# Patient Record
Sex: MALE | Race: WHITE | Employment: PART TIME | URBAN - METROPOLITAN AREA
[De-identification: names, ages, dates, MRNs, and addresses within clinical notes are randomized per-mention and may not be internally consistent; named-entity substitution may affect disease eponyms.]

---

## 2019-02-12 ENCOUNTER — HOSPITAL ENCOUNTER (OUTPATIENT)
Age: 44
Setting detail: SPECIMEN
Discharge: HOME | End: 2019-02-12
Payer: COMMERCIAL

## 2019-02-12 LAB
ABSOLUTE EOS #: 0.15 K/UL (ref 0–0.44)
ABSOLUTE IMMATURE GRANULOCYTE: 0.03 K/UL (ref 0–0.3)
ABSOLUTE LYMPH #: 3.63 K/UL (ref 1.1–3.7)
ABSOLUTE MONO #: 0.72 K/UL (ref 0.1–1.2)
ALBUMIN SERPL-MCNC: 4.5 G/DL (ref 3.5–5.2)
ALBUMIN/GLOBULIN RATIO: 1.5 (ref 1–2.5)
ALP BLD-CCNC: 86 U/L (ref 40–129)
ALT SERPL-CCNC: 36 U/L (ref 5–41)
ANION GAP SERPL CALCULATED.3IONS-SCNC: 14 MMOL/L (ref 9–17)
AST SERPL-CCNC: 35 U/L
BASOPHILS # BLD: 1 % (ref 0–2)
BASOPHILS ABSOLUTE: 0.05 K/UL (ref 0–0.2)
BILIRUB SERPL-MCNC: 0.49 MG/DL (ref 0.3–1.2)
BUN BLDV-MCNC: 9 MG/DL (ref 6–20)
BUN/CREAT BLD: ABNORMAL (ref 9–20)
CALCIUM SERPL-MCNC: 9.8 MG/DL (ref 8.6–10.4)
CHLORIDE BLD-SCNC: 99 MMOL/L (ref 98–107)
CHOLESTEROL/HDL RATIO: 4
CHOLESTEROL: 174 MG/DL
CO2: 27 MMOL/L (ref 20–31)
CREAT SERPL-MCNC: 0.94 MG/DL (ref 0.7–1.2)
DIFFERENTIAL TYPE: NORMAL
EOSINOPHILS RELATIVE PERCENT: 2 % (ref 1–4)
GFR AFRICAN AMERICAN: >60 ML/MIN
GFR NON-AFRICAN AMERICAN: >60 ML/MIN
GFR SERPL CREATININE-BSD FRML MDRD: ABNORMAL ML/MIN/{1.73_M2}
GFR SERPL CREATININE-BSD FRML MDRD: ABNORMAL ML/MIN/{1.73_M2}
GLUCOSE BLD-MCNC: 102 MG/DL (ref 70–99)
HCT VFR BLD CALC: 48.8 % (ref 40.7–50.3)
HDLC SERPL-MCNC: 44 MG/DL
HEMOGLOBIN: 15.7 G/DL (ref 13–17)
IMMATURE GRANULOCYTES: 0 %
LDL CHOLESTEROL: 101 MG/DL (ref 0–130)
LYMPHOCYTES # BLD: 36 % (ref 24–43)
MCH RBC QN AUTO: 29.3 PG (ref 25.2–33.5)
MCHC RBC AUTO-ENTMCNC: 32.2 G/DL (ref 28.4–34.8)
MCV RBC AUTO: 91.2 FL (ref 82.6–102.9)
MONOCYTES # BLD: 7 % (ref 3–12)
NRBC AUTOMATED: 0 PER 100 WBC
PDW BLD-RTO: 13.4 % (ref 11.8–14.4)
PLATELET # BLD: 310 K/UL (ref 138–453)
PLATELET ESTIMATE: NORMAL
PMV BLD AUTO: 10.6 FL (ref 8.1–13.5)
POTASSIUM SERPL-SCNC: 4.7 MMOL/L (ref 3.7–5.3)
RBC # BLD: 5.35 M/UL (ref 4.21–5.77)
RBC # BLD: NORMAL 10*6/UL
SEG NEUTROPHILS: 54 % (ref 36–65)
SEGMENTED NEUTROPHILS ABSOLUTE COUNT: 5.63 K/UL (ref 1.5–8.1)
SODIUM BLD-SCNC: 140 MMOL/L (ref 135–144)
TOTAL PROTEIN: 7.6 G/DL (ref 6.4–8.3)
TRIGL SERPL-MCNC: 147 MG/DL
VLDLC SERPL CALC-MCNC: NORMAL MG/DL (ref 1–30)
WBC # BLD: 10.2 K/UL (ref 3.5–11.3)
WBC # BLD: NORMAL 10*3/UL

## 2019-04-15 ENCOUNTER — OFFICE VISIT (OUTPATIENT)
Dept: FAMILY MEDICINE CLINIC | Age: 44
End: 2019-04-15
Payer: COMMERCIAL

## 2019-04-15 VITALS
BODY MASS INDEX: 40.03 KG/M2 | DIASTOLIC BLOOD PRESSURE: 100 MMHG | OXYGEN SATURATION: 98 % | SYSTOLIC BLOOD PRESSURE: 160 MMHG | RESPIRATION RATE: 16 BRPM | TEMPERATURE: 97 F | HEIGHT: 70 IN | HEART RATE: 73 BPM | WEIGHT: 279.6 LBS

## 2019-04-15 DIAGNOSIS — I10 ESSENTIAL HYPERTENSION: Primary | ICD-10-CM

## 2019-04-15 DIAGNOSIS — F17.200 SMOKER: ICD-10-CM

## 2019-04-15 DIAGNOSIS — M54.6 CHRONIC BILATERAL THORACIC BACK PAIN: ICD-10-CM

## 2019-04-15 DIAGNOSIS — M54.50 CHRONIC BILATERAL LOW BACK PAIN WITHOUT SCIATICA: ICD-10-CM

## 2019-04-15 DIAGNOSIS — G89.29 CHRONIC BILATERAL THORACIC BACK PAIN: ICD-10-CM

## 2019-04-15 DIAGNOSIS — G89.29 CHRONIC PAIN OF RIGHT KNEE: ICD-10-CM

## 2019-04-15 DIAGNOSIS — G89.29 CHRONIC BILATERAL LOW BACK PAIN WITHOUT SCIATICA: ICD-10-CM

## 2019-04-15 DIAGNOSIS — M25.521 RIGHT ELBOW PAIN: ICD-10-CM

## 2019-04-15 DIAGNOSIS — M25.561 CHRONIC PAIN OF RIGHT KNEE: ICD-10-CM

## 2019-04-15 PROCEDURE — G8427 DOCREV CUR MEDS BY ELIG CLIN: HCPCS | Performed by: NURSE PRACTITIONER

## 2019-04-15 PROCEDURE — G8417 CALC BMI ABV UP PARAM F/U: HCPCS | Performed by: NURSE PRACTITIONER

## 2019-04-15 PROCEDURE — 99204 OFFICE O/P NEW MOD 45 MIN: CPT | Performed by: NURSE PRACTITIONER

## 2019-04-15 PROCEDURE — 4004F PT TOBACCO SCREEN RCVD TLK: CPT | Performed by: NURSE PRACTITIONER

## 2019-04-15 RX ORDER — NAPROXEN 500 MG/1
500 TABLET ORAL 2 TIMES DAILY
Qty: 60 TABLET | Refills: 5 | Status: SHIPPED | OUTPATIENT
Start: 2019-04-15 | End: 2020-03-18 | Stop reason: SDUPTHER

## 2019-04-15 RX ORDER — HYDROCHLOROTHIAZIDE 25 MG/1
25 TABLET ORAL EVERY MORNING
Qty: 30 TABLET | Refills: 5 | Status: SHIPPED | OUTPATIENT
Start: 2019-04-15 | End: 2020-01-22 | Stop reason: SDUPTHER

## 2019-04-15 RX ORDER — NAPROXEN 500 MG/1
1 TABLET ORAL 2 TIMES DAILY
COMMUNITY
Start: 2019-04-02 | End: 2019-04-15 | Stop reason: SDUPTHER

## 2019-04-15 RX ORDER — PRAZOSIN HYDROCHLORIDE 2 MG/1
2 CAPSULE ORAL DAILY
COMMUNITY
Start: 2019-03-18 | End: 2022-02-15

## 2019-04-15 RX ORDER — LOSARTAN POTASSIUM 100 MG/1
1 TABLET ORAL DAILY
COMMUNITY
Start: 2019-03-26 | End: 2019-04-15 | Stop reason: SDUPTHER

## 2019-04-15 RX ORDER — LOSARTAN POTASSIUM 100 MG/1
100 TABLET ORAL DAILY
Qty: 30 TABLET | Refills: 5 | Status: SHIPPED | OUTPATIENT
Start: 2019-04-15 | End: 2020-01-06

## 2019-04-15 RX ORDER — QUETIAPINE FUMARATE 50 MG/1
1 TABLET, FILM COATED ORAL DAILY
COMMUNITY
Start: 2019-03-18 | End: 2020-10-06 | Stop reason: ALTCHOICE

## 2019-04-15 RX ORDER — OXCARBAZEPINE 300 MG/1
1 TABLET, FILM COATED ORAL DAILY
COMMUNITY
Start: 2019-03-18

## 2019-04-15 SDOH — HEALTH STABILITY: MENTAL HEALTH: HOW OFTEN DO YOU HAVE A DRINK CONTAINING ALCOHOL?: NEVER

## 2019-04-15 ASSESSMENT — ENCOUNTER SYMPTOMS
BACK PAIN: 1
ABDOMINAL PAIN: 0
SHORTNESS OF BREATH: 0
SORE THROAT: 1
COUGH: 0

## 2019-04-15 NOTE — PROGRESS NOTES
4/15/2019    Joshua Camargo (:  1975) is a 44 y.o. male, here for evaluation of the following medical concerns:    HPI   He is here today as a new patient. He was being seen previously by different providers in [facility]. He was incarcerated for a period of time. He most recently was treated inpatient at [facility] for heroin addiction. At that time he was also started on psychiatric medications. He follows up there for medication management and group therapy. He complains of chronic back pain and was told he has arthritis in his back. He is currently seeing a chiropractor. He does not want to do any physical therapy at this time. He is also complaining of right elbow pain. He has been trying to lift more weights and the repetitive motions seem to bother the area. He hears a popping sound with it. He is complaining of right knee pain. He states while he was incarcerated he slipped and fell directly on his right knee. At the time he did not have it evaluated but he feels it is still painful. Hypertension:  Home blood pressure monitoring: No.  He is not adherent to a low sodium diet. Patient denies chest pain, shortness of breath, headache, lightheadedness, blurred vision, peripheral edema, palpitations, dry cough and fatigue. Antihypertensive medication side effects: no medication side effects noted. Use of agents associated with hypertension: none. Sodium (mmol/L)   Date Value   2019 140    BUN (mg/dL)   Date Value   2019 9    Glucose (mg/dL)   Date Value   2019 102 (H)      Potassium (mmol/L)   Date Value   2019 4.7    CREATININE (mg/dL)   Date Value   2019 0.94           Review of Systems   Constitutional: Positive for fatigue. Negative for fever. HENT: Positive for sore throat. Eyes: Negative for visual disturbance. Respiratory: Negative for cough and shortness of breath. Cardiovascular: Negative for chest pain and palpitations. Gastrointestinal: Negative for abdominal pain. Genitourinary: Negative for difficulty urinating. Musculoskeletal: Positive for arthralgias (right knee pain-fell on patella 2 months ago) and back pain. Seeing his chiropractor-states he has arthritis in his spine   Skin: Negative for rash. Neurological: Negative for dizziness, light-headedness and headaches. Psychiatric/Behavioral: Positive for dysphoric mood. Negative for self-injury, sleep disturbance and suicidal ideas. The patient is nervous/anxious. Goes through [facility] for psychiatric care       Prior to Visit Medications    Not on File        Allergies   Allergen Reactions    Pcn [Penicillins]      Thinks pcn        No past medical history on file. No past surgical history on file.     Social History     Socioeconomic History    Marital status: Unknown     Spouse name: Not on file    Number of children: Not on file    Years of education: Not on file    Highest education level: Not on file   Occupational History    Not on file   Social Needs    Financial resource strain: Not on file    Food insecurity:     Worry: Not on file     Inability: Not on file    Transportation needs:     Medical: Not on file     Non-medical: Not on file   Tobacco Use    Smoking status: Current Every Day Smoker    Smokeless tobacco: Current User    Tobacco comment: one pack a day   Substance and Sexual Activity    Alcohol use: Not Currently     Frequency: Never    Drug use: Not on file     Comment: 127 days sober    Sexual activity: Not on file   Lifestyle    Physical activity:     Days per week: Not on file     Minutes per session: Not on file    Stress: Not on file   Relationships    Social connections:     Talks on phone: Not on file     Gets together: Not on file     Attends Faith service: Not on file     Active member of club or organization: Not on file     Attends meetings of clubs or organizations: Not on file     Relationship status: Not on file    Intimate partner violence:     Fear of current or ex partner: Not on file     Emotionally abused: Not on file     Physically abused: Not on file     Forced sexual activity: Not on file   Other Topics Concern    Not on file   Social History Narrative    Not on file        No family history on file. Vitals:    04/15/19 0910 04/15/19 0924   BP: (!) 160/100 (!) 160/100   Site: Right Upper Arm Right Upper Arm   Position: Sitting Sitting   Cuff Size: Large Adult Large Adult   Pulse: 73    Resp: 16    Temp: 97 °F (36.1 °C)    TempSrc: Tympanic    SpO2: 98%    Weight: 279 lb 9.6 oz (126.8 kg)    Height: 5' 10\" (1.778 m)      Estimated body mass index is 40.12 kg/m² as calculated from the following:    Height as of this encounter: 5' 10\" (1.778 m). Weight as of this encounter: 279 lb 9.6 oz (126.8 kg). Physical Exam   Constitutional: He is oriented to person, place, and time. He appears well-developed and well-nourished. Cardiovascular: Normal rate and regular rhythm. Pulmonary/Chest: Effort normal and breath sounds normal.   Abdominal: Normal appearance and bowel sounds are normal.   obese   Musculoskeletal:        Right elbow: He exhibits normal range of motion (popping sound noted with ROM) and no swelling. Tenderness found. Lateral epicondyle tenderness noted. No radial head, no medial epicondyle and no olecranon process tenderness noted. Right knee: He exhibits no swelling, no effusion and no erythema. Tenderness found. Patellar tendon tenderness noted. No medial joint line and no lateral joint line tenderness noted. Neurological: He is alert and oriented to person, place, and time. Skin: Skin is warm and dry. Psychiatric: His speech is normal and behavior is normal. Judgment and thought content normal. His mood appears anxious. His affect is not angry.  Cognition and memory are normal. He does not exhibit

## 2019-04-15 NOTE — PATIENT INSTRUCTIONS

## 2019-05-14 ENCOUNTER — HOSPITAL ENCOUNTER (OUTPATIENT)
Dept: GENERAL RADIOLOGY | Age: 44
Discharge: HOME | End: 2019-05-16
Payer: COMMERCIAL

## 2019-05-14 DIAGNOSIS — G89.29 CHRONIC PAIN OF RIGHT KNEE: ICD-10-CM

## 2019-05-14 DIAGNOSIS — M25.561 CHRONIC PAIN OF RIGHT KNEE: ICD-10-CM

## 2019-05-14 PROCEDURE — 73562 X-RAY EXAM OF KNEE 3: CPT

## 2019-05-22 ENCOUNTER — OFFICE VISIT (OUTPATIENT)
Dept: FAMILY MEDICINE CLINIC | Age: 44
End: 2019-05-22
Payer: COMMERCIAL

## 2019-05-22 VITALS
DIASTOLIC BLOOD PRESSURE: 80 MMHG | RESPIRATION RATE: 16 BRPM | HEART RATE: 81 BPM | SYSTOLIC BLOOD PRESSURE: 130 MMHG | WEIGHT: 285.6 LBS | BODY MASS INDEX: 40.98 KG/M2 | OXYGEN SATURATION: 98 % | TEMPERATURE: 96.5 F

## 2019-05-22 DIAGNOSIS — R06.2 WHEEZING: ICD-10-CM

## 2019-05-22 DIAGNOSIS — R22.32 SKIN LUMP OF ARM, LEFT: ICD-10-CM

## 2019-05-22 DIAGNOSIS — R06.81 WITNESSED EPISODE OF APNEA: ICD-10-CM

## 2019-05-22 DIAGNOSIS — E66.01 CLASS 3 SEVERE OBESITY DUE TO EXCESS CALORIES WITHOUT SERIOUS COMORBIDITY WITH BODY MASS INDEX (BMI) OF 40.0 TO 44.9 IN ADULT (HCC): ICD-10-CM

## 2019-05-22 DIAGNOSIS — R06.83 SNORING: ICD-10-CM

## 2019-05-22 DIAGNOSIS — F17.200 SMOKER: Primary | ICD-10-CM

## 2019-05-22 DIAGNOSIS — R53.83 FATIGUE, UNSPECIFIED TYPE: ICD-10-CM

## 2019-05-22 PROCEDURE — G8417 CALC BMI ABV UP PARAM F/U: HCPCS | Performed by: NURSE PRACTITIONER

## 2019-05-22 PROCEDURE — 99214 OFFICE O/P EST MOD 30 MIN: CPT | Performed by: NURSE PRACTITIONER

## 2019-05-22 PROCEDURE — G8427 DOCREV CUR MEDS BY ELIG CLIN: HCPCS | Performed by: NURSE PRACTITIONER

## 2019-05-22 PROCEDURE — 4004F PT TOBACCO SCREEN RCVD TLK: CPT | Performed by: NURSE PRACTITIONER

## 2019-05-22 RX ORDER — ALBUTEROL SULFATE 90 UG/1
2 AEROSOL, METERED RESPIRATORY (INHALATION) 4 TIMES DAILY PRN
Qty: 3 INHALER | Refills: 1 | Status: SHIPPED | OUTPATIENT
Start: 2019-05-22 | End: 2020-01-22 | Stop reason: SDUPTHER

## 2019-05-22 ASSESSMENT — PATIENT HEALTH QUESTIONNAIRE - PHQ9
1. LITTLE INTEREST OR PLEASURE IN DOING THINGS: 2
SUM OF ALL RESPONSES TO PHQ QUESTIONS 1-9: 2
SUM OF ALL RESPONSES TO PHQ9 QUESTIONS 1 & 2: 2
2. FEELING DOWN, DEPRESSED OR HOPELESS: 0
SUM OF ALL RESPONSES TO PHQ QUESTIONS 1-9: 2

## 2019-05-22 NOTE — PROGRESS NOTES
2019     Joshua Caamrgo (:  1975) is a 44 y.o. male, here for evaluation of the following medical concerns:    HPI  Hypertension:  Home blood pressure monitoring: No.  He is not adherent to a low sodium diet. Patient denies chest pain, shortness of breath, headache, lightheadedness, blurred vision, peripheral edema, palpitations, dry cough and fatigue. Antihypertensive medication side effects: no medication side effects noted. Use of agents associated with hypertension: none                            About 7 months ago he noticed a lump on the inside of his left upper arm and it is getting bigger in size. He denies any pain or known injury. His girlfriend states that he snores and has apneas. He is tired during the day. He also feels more wheezy at night time. He has used an albuterol inhaler in the past and he states it has helped. He is a smoker and would like to try and quit. Sodium (mmol/L)   Date Value   2019 140    BUN (mg/dL)   Date Value   2019 9    Glucose (mg/dL)   Date Value   2019 102 (H)      Potassium (mmol/L)   Date Value   2019 4.7    CREATININE (mg/dL)   Date Value   2019 0.94           Review of Systems   Constitutional: Negative for fatigue and fever. Cardiovascular: Negative for chest pain. Musculoskeletal: Positive for arthralgias. Skin: Negative for rash. Psychiatric/Behavioral: Positive for sleep disturbance. Negative for self-injury and suicidal ideas. The patient is nervous/anxious. Prior to Visit Medications    Medication Sig Taking?  Authorizing Provider   OXcarbazepine (TRILEPTAL) 300 MG tablet Take 1 tablet by mouth daily Yes Historical Provider, MD   prazosin (MINIPRESS) 2 MG capsule Take 2 capsules by mouth daily Yes Historical Provider, MD   QUEtiapine (SEROQUEL) 50 MG tablet Take 1 tablet by mouth daily Yes Historical Provider, MD   hydrochlorothiazide (HYDRODIURIL) 25 MG tablet Take 1 tablet by mouth every morning Yes Simona Freya, APRN - CNP   losartan (COZAAR) 100 MG tablet Take 1 tablet by mouth daily Yes Dorma Ice, APRN - CNP   naproxen (NAPROSYN) 500 MG tablet Take 1 tablet by mouth 2 times daily Yes Simona Ice, APRN - CNP        Social History     Tobacco Use    Smoking status: Current Every Day Smoker    Smokeless tobacco: Current User    Tobacco comment: one pack a day   Substance Use Topics    Alcohol use: Not Currently     Frequency: Never        Vitals:    05/22/19 0907   BP: 130/80   Site: Right Upper Arm   Position: Sitting   Cuff Size: Large Adult   Pulse: 81   Resp: 16   Temp: 96.5 °F (35.8 °C)   TempSrc: Tympanic   SpO2: 98%   Weight: 285 lb 9.6 oz (129.5 kg)     Estimated body mass index is 40.98 kg/m² as calculated from the following:    Height as of 4/15/19: 5' 10\" (1.778 m). Weight as of this encounter: 285 lb 9.6 oz (129.5 kg). Physical Exam   Constitutional: He appears well-developed and well-nourished. HENT:   Head: Normocephalic and atraumatic. Eyes: Conjunctivae are normal.   Cardiovascular: Normal rate and regular rhythm. Pulmonary/Chest: Effort normal and breath sounds normal.   Skin: Skin is warm and dry. Psychiatric: He has a normal mood and affect. His behavior is normal. Judgment and thought content normal.   Nursing note and vitals reviewed. ASSESSMENT/PLAN:  1. Smoker  - nicotine polacrilex (NICORETTE) 4 MG gum; Take 1 each by mouth as needed for Smoking cessation  Dispense: 110 each; Refill: 3    2. Snoring  - Sleep Study with PAP Titration; Future  - [address]    3. Witnessed episode of apnea  - Sleep Study with PAP Titration; Future  - [address]    4. Fatigue, unspecified type  - Sleep Study with PAP Titration; Future  - [address]    5.  Class 3 severe obesity due to excess calories without serious comorbidity with body mass index (BMI) of 40.0 to 44.9 in adult [facility])  - Sleep Study with PAP Titration; Future  - [address]    6. Wheezing  - albuterol sulfate  (90 Base) MCG/ACT inhaler; Inhale 2 puffs into the lungs 4 times daily as needed for Wheezing  Dispense: 3 Inhaler; Refill: 1    7. Skin lump of arm, left  - US EXTREMITY LEFT NON VASC LIMITED; Future    Return in about 3 months (around 8/22/2019), or if symptoms worsen or fail to improve, for hypertension. Orders Placed This Encounter   Medications    nicotine polacrilex (NICORETTE) 4 MG gum     Sig: Take 1 each by mouth as needed for Smoking cessation     Dispense:  110 each     Refill:  3    albuterol sulfate  (90 Base) MCG/ACT inhaler     Sig: Inhale 2 puffs into the lungs 4 times daily as needed for Wheezing     Dispense:  3 Inhaler     Refill:  1     Orders Placed This Encounter   Procedures    US EXTREMITY LEFT NON VASC LIMITED     Standing Status:   Future     Standing Expiration Date:   5/22/2020     Order Specific Question:   Reason for exam:     Answer:   soft palpable lump left upper arm   [address]     Referral Priority:   Routine     Referral Type:   Eval and Treat     Referral Reason:   Specialty Services Required     Requested Specialty:   Sleep Medicine     Number of Visits Requested:   1    Sleep Study with PAP Titration     Standing Status:   Future     Standing Expiration Date:   5/22/2020     Order Specific Question:   Sleep Study Titration Type     Answer:   CPAP     Order Specific Question:   Location For Sleep Study     Answer:   [place]     Order Specific Question:   Select Sleep Lab Location     Answer:   [facility]       Patient given educational materials - see patient instructions. Discussed use, benefit, and side effects of prescribed medications. All patient questions answered. Pt voiced understanding. Reviewed health maintenance. Instructed to continue current medications, diet and exercise.       Electronically signed by DAWIT Griffin - CNP on 5/22/2019 at 12:58 PM

## 2019-05-22 NOTE — PATIENT INSTRUCTIONS
Patient Education        Sleep Apnea: Care Instructions  Your Care Instructions    Sleep apnea means that you frequently stop breathing for 10 seconds or longer during sleep. It can be mild to severe, based on the number of times an hour that you stop breathing or have slowed breathing. Blocked or narrowed airways in your nose, mouth, or throat can cause sleep apnea. Your airway can become blocked when your throat muscles and tongue relax during sleep. You can treat sleep apnea at home by making lifestyle changes. You also can use a CPAP breathing machine that keeps tissues in the throat from blocking your airway. Or your doctor may suggest that you use a breathing device while you sleep. It helps keep your airway open. This could be a device that you put in your mouth. In some cases, surgery may be needed to remove enlarged tissues in the throat. Follow-up care is a key part of your treatment and safety. Be sure to make and go to all appointments, and call your doctor if you are having problems. It's also a good idea to know your test results and keep a list of the medicines you take. How can you care for yourself at home? · Lose weight, if needed. It may reduce the number of times you stop breathing or have slowed breathing. · Sleep on your side. It may stop mild apnea. If you tend to roll onto your back, sew a pocket in the back of your pajama top. Put a tennis ball into the pocket, and stitch the pocket shut. This will help keep you from sleeping on your back. · Avoid alcohol and medicines such as sleeping pills and sedatives before bed. · Do not smoke. Smoking can make sleep apnea worse. If you need help quitting, talk to your doctor about stop-smoking programs and medicines. These can increase your chances of quitting for good. · Prop up the head of your bed 4 to 6 inches by putting bricks under the legs of the bed.   · Treat breathing problems, such as a stuffy nose, caused by a cold or Version: 12.0  © 2139-7719 [employer]. Care instructions adapted under license by [facility] ([facility]). If you have questions about a medical condition or this instruction, always ask your healthcare professional. [address] any warranty or liability for your use of this information.

## 2019-05-29 ENCOUNTER — TELEPHONE (OUTPATIENT)
Dept: FAMILY MEDICINE CLINIC | Age: 44
End: 2019-05-29

## 2019-05-29 DIAGNOSIS — R53.83 FATIGUE, UNSPECIFIED TYPE: ICD-10-CM

## 2019-05-29 DIAGNOSIS — R06.81 WITNESSED EPISODE OF APNEA: ICD-10-CM

## 2019-05-29 DIAGNOSIS — E66.01 CLASS 3 SEVERE OBESITY DUE TO EXCESS CALORIES WITHOUT SERIOUS COMORBIDITY WITH BODY MASS INDEX (BMI) OF 40.0 TO 44.9 IN ADULT (HCC): ICD-10-CM

## 2019-05-29 DIAGNOSIS — R06.83 SNORING: Primary | ICD-10-CM

## 2019-05-29 NOTE — TELEPHONE ENCOUNTER
Needs order for Baseline diagnostic study SLE3 put in. Do not delete the other order as they may still need it.

## 2019-06-27 ENCOUNTER — OFFICE VISIT (OUTPATIENT)
Dept: FAMILY MEDICINE CLINIC | Age: 44
End: 2019-06-27
Payer: COMMERCIAL

## 2019-06-27 VITALS
DIASTOLIC BLOOD PRESSURE: 96 MMHG | SYSTOLIC BLOOD PRESSURE: 156 MMHG | HEART RATE: 88 BPM | OXYGEN SATURATION: 95 % | TEMPERATURE: 97.4 F

## 2019-06-27 DIAGNOSIS — Z91.09 ENVIRONMENTAL ALLERGIES: ICD-10-CM

## 2019-06-27 DIAGNOSIS — J01.00 ACUTE MAXILLARY SINUSITIS, RECURRENCE NOT SPECIFIED: ICD-10-CM

## 2019-06-27 DIAGNOSIS — H10.9 CONJUNCTIVITIS OF BOTH EYES, UNSPECIFIED CONJUNCTIVITIS TYPE: Primary | ICD-10-CM

## 2019-06-27 PROCEDURE — G8417 CALC BMI ABV UP PARAM F/U: HCPCS | Performed by: NURSE PRACTITIONER

## 2019-06-27 PROCEDURE — 99214 OFFICE O/P EST MOD 30 MIN: CPT | Performed by: NURSE PRACTITIONER

## 2019-06-27 PROCEDURE — 4004F PT TOBACCO SCREEN RCVD TLK: CPT | Performed by: NURSE PRACTITIONER

## 2019-06-27 PROCEDURE — G8427 DOCREV CUR MEDS BY ELIG CLIN: HCPCS | Performed by: NURSE PRACTITIONER

## 2019-06-27 RX ORDER — CETIRIZINE HYDROCHLORIDE 10 MG/1
10 TABLET ORAL DAILY
Qty: 30 TABLET | Refills: 0 | Status: SHIPPED | OUTPATIENT
Start: 2019-06-27 | End: 2019-07-27

## 2019-06-27 RX ORDER — POLYMYXIN B SULFATE AND TRIMETHOPRIM 1; 10000 MG/ML; [USP'U]/ML
1 SOLUTION OPHTHALMIC
Qty: 1 BOTTLE | Refills: 0 | Status: SHIPPED | OUTPATIENT
Start: 2019-06-27 | End: 2019-07-07

## 2019-06-27 RX ORDER — PREDNISONE 20 MG/1
20 TABLET ORAL DAILY
Qty: 5 TABLET | Refills: 0 | Status: SHIPPED | OUTPATIENT
Start: 2019-06-27 | End: 2019-07-02

## 2019-06-27 RX ORDER — DOXYCYCLINE HYCLATE 100 MG/1
100 CAPSULE ORAL 2 TIMES DAILY
Qty: 14 CAPSULE | Refills: 0 | Status: SHIPPED | OUTPATIENT
Start: 2019-06-27 | End: 2019-07-04

## 2019-06-27 ASSESSMENT — ENCOUNTER SYMPTOMS
RHINORRHEA: 1
EYE PAIN: 0
EYE ITCHING: 1
SINUS PAIN: 1
SINUS PRESSURE: 1
COUGH: 1
WHEEZING: 1
EYE REDNESS: 1
EYE DISCHARGE: 1

## 2019-06-27 NOTE — PROGRESS NOTES
[address], APRN-[facility]  [address]  Phone:  [phone]  Fax:  [phone]  Nini Lazcano is a 38 y.o. male who presents today for his medical conditions/complaints as noted below. Joshua A McQuillin c/o of Sinusitis (started around 10 days ago. sneezing, runny nose, congestion, productive cough, watery eyes.)      HPI:     Cough   This is a new problem. The current episode started 1 to 4 weeks ago (10 days). The cough is productive of sputum (yellow). Associated symptoms include eye redness, nasal congestion, rhinorrhea and wheezing. Pertinent negatives include no ear congestion, ear pain, fever or headaches. Associated symptoms comments: Eye drainage - yellow  . Exacerbated by: humidity. Risk factors for lung disease include smoking/tobacco exposure and animal exposure. He has tried a beta-agonist inhaler (4 times a day) for the symptoms. The treatment provided mild relief. His past medical history is significant for bronchitis and environmental allergies. There is no history of asthma or pneumonia. Conjunctivitis    The current episode started 2 days ago. The onset was sudden. The problem occurs frequently. The problem has been gradually worsening. The problem is mild. Nothing aggravates the symptoms. Associated symptoms include eye itching, rhinorrhea, cough, wheezing, eye discharge (yellow green) and eye redness. Pertinent negatives include no fever, no ear pain, no headaches and no eye pain. Both eyes are affected. The eyelid exhibits redness.        Wt Readings from Last 3 Encounters:   05/22/19 285 lb 9.6 oz (129.5 kg)   04/15/19 279 lb 9.6 oz (126.8 kg)       Temp Readings from Last 3 Encounters:   06/27/19 97.4 °F (36.3 °C) (Tympanic)   05/22/19 96.5 °F (35.8 °C) (Tympanic)   04/15/19 97 °F (36.1 °C) (Tympanic)       BP Readings from Last 3 Encounters:   06/27/19 (!) 156/96   05/22/19 130/80   04/15/19 (!) 160/100       Pulse Readings from Last 3 Encounters:   06/27/19 88   05/22/19 81   04/15/19 73              Past Medical History:   Diagnosis Date    Bipolar 1 disorder ([facility] [address]     History of heroin abuse     Hypertension     Personality disorder ([facility] [address]       Past Surgical History:   Procedure Laterality Date    FINGER SURGERY Left     index finger    KNEE SURGERY Left     had metal removed     No family history on file. Social History     Tobacco Use    Smoking status: Current Every Day Smoker     Types: Cigarettes    Smokeless tobacco: Current User    Tobacco comment: one pack a day   Substance Use Topics    Alcohol use: Not Currently     Frequency: Never      Current Outpatient Medications   Medication Sig Dispense Refill    trimethoprim-polymyxin b (POLYTRIM) 45843-0.1 UNIT/ML-% ophthalmic solution Place 1 drop into both eyes every 4 hours (while awake) for 10 days 1 Bottle 0    doxycycline hyclate (VIBRAMYCIN) 100 MG capsule Take 1 capsule by mouth 2 times daily for 7 days With food.  14 capsule 0    predniSONE (DELTASONE) 20 MG tablet Take 1 tablet by mouth daily for 5 days 5 tablet 0    cetirizine (ZYRTEC) 10 MG tablet Take 1 tablet by mouth daily 30 tablet 0    nicotine polacrilex (NICORETTE) 4 MG gum Take 1 each by mouth as needed for Smoking cessation 110 each 3    albuterol sulfate  (90 Base) MCG/ACT inhaler Inhale 2 puffs into the lungs 4 times daily as needed for Wheezing 3 Inhaler 1    OXcarbazepine (TRILEPTAL) 300 MG tablet Take 1 tablet by mouth daily      prazosin (MINIPRESS) 2 MG capsule Take 2 capsules by mouth daily      hydrochlorothiazide (HYDRODIURIL) 25 MG tablet Take 1 tablet by mouth every morning 30 tablet 5    losartan (COZAAR) 100 MG tablet Take 1 tablet by mouth daily 30 tablet 5    naproxen (NAPROSYN) 500 MG tablet Take 1 tablet by mouth 2 times daily 60 tablet 5    QUEtiapine (SEROQUEL) 50 MG tablet Take 1 tablet by mouth daily       No current facility-administered medications for this visit. Allergies   Allergen Reactions    Pcn [Penicillins]      Thinks pcn        No exam data present    Subjective:      Review of Systems   Constitutional: Negative for fever. HENT: Positive for rhinorrhea, sinus pressure and sinus pain. Negative for ear pain and nosebleeds. Eyes: Positive for discharge (yellow green), redness and itching. Negative for pain. Respiratory: Positive for cough and wheezing. Allergic/Immunologic: Positive for environmental allergies. Neurological: Negative for headaches. Objective:     BP (!) 156/96 (Site: Right Upper Arm, Position: Sitting, Cuff Size: Large Adult)   Pulse 88   Temp 97.4 °F (36.3 °C) (Tympanic)   SpO2 95%     Physical Exam   Constitutional: He is oriented to person, place, and time. Vital signs are normal. He appears well-developed and well-nourished. Non-toxic appearance. He does not have a sickly appearance. He does not appear ill. No distress. HENT:   Head: Normocephalic. Right Ear: Tympanic membrane and external ear normal.   Left Ear: Tympanic membrane and external ear normal.   Nose: Mucosal edema present. No rhinorrhea. Right sinus exhibits maxillary sinus tenderness. Right sinus exhibits no frontal sinus tenderness. Left sinus exhibits maxillary sinus tenderness. Left sinus exhibits no frontal sinus tenderness. Mouth/Throat: Uvula is midline and mucous membranes are normal. Mucous membranes are not pale and not dry. Eyes: Pupils are equal, round, and reactive to light. EOM and lids are normal. Right eye exhibits discharge. Left eye exhibits discharge. Right conjunctiva is injected. Left conjunctiva is injected. No scleral icterus. Right eye exhibits no nystagmus. Left eye exhibits no nystagmus. Neck: Trachea normal, normal range of motion and full passive range of motion without pain. No thyroid mass present. Cardiovascular: S1 normal and S2 normal.   Pulmonary/Chest: Effort normal. No accessory muscle usage.  No respiratory distress. Musculoskeletal: Normal range of motion. Neurological: He is alert and oriented to person, place, and time. Skin: Skin is warm, dry and intact. He is not diaphoretic. No pallor. Psychiatric: He has a normal mood and affect. His speech is normal and behavior is normal. Judgment and thought content normal. Cognition and memory are normal.       Assessment:      Diagnosis Orders   1. Conjunctivitis of both eyes, unspecified conjunctivitis type  trimethoprim-polymyxin b (POLYTRIM) 77970-4.1 UNIT/ML-% ophthalmic solution   2. Acute maxillary sinusitis, recurrence not specified  doxycycline hyclate (VIBRAMYCIN) 100 MG capsule   3. Environmental allergies  predniSONE (DELTASONE) 20 MG tablet    cetirizine (ZYRTEC) 10 MG tablet     No results found for this visit on 06/27/19. Plan:       Polytrim eye drops. Take the prednisone with food, preferably first thing in the morning. Zyrtec daily. Doxycycline as directed. Note for today and  Tomorrow. Follow up with primary care provider in 1 to 2 days. Patient Instructions     Polytrim eye drops. Take the prednisone with food, preferably first thing in the morning. Zyrtec daily. Doxycycline as directed. Note for today and  Tomorrow. Follow up with primary care provider in 1 to 2 days. Patient Education        Pinkeye: Care Instructions  Your Care Instructions    Pinkeye is redness and swelling of the eye surface and the conjunctiva (the lining of the eyelid and the covering of the white part of the eye). Pinkeye is also called conjunctivitis. Pinkeye is often caused by infection with bacteria or a virus. Dry air, allergies, smoke, and chemicals are other common causes. Pinkeye often clears on its own in 7 to 10 days. Antibiotics only help if the pinkeye is caused by bacteria. Pinkeye caused by infection spreads easily.  If an allergy or chemical is causing pinkeye, it will not go away unless you can avoid whatever is causing it. Follow-up care is a key part of your treatment and safety. Be sure to make and go to all appointments, and call your doctor if you are having problems. It's also a good idea to know your test results and keep a list of the medicines you take. How can you care for yourself at home? · Wash your hands often. Always wash them before and after you treat pinkeye or touch your eyes or face. · Use moist cotton or a clean, wet cloth to remove crust. Wipe from the inside corner of the eye to the outside. Use a clean part of the cloth for each wipe. · Put cold or warm wet cloths on your eye a few times a day if the eye hurts. · Do not wear contact lenses or eye makeup until the pinkeye is gone. Throw away any eye makeup you were using when you got pinkeye. Clean your contacts and storage case. If you wear disposable contacts, use a new pair when your eye has cleared and it is safe to wear contacts again. · If the doctor gave you antibiotic ointment or eyedrops, use them as directed. Use the medicine for as long as instructed, even if your eye starts looking better soon. Keep the bottle tip clean, and do not let it touch the eye area. · To put in eyedrops or ointment:  ? Tilt your head back, and pull your lower eyelid down with one finger. ? Drop or squirt the medicine inside the lower lid. ? Close your eye for 30 to 60 seconds to let the drops or ointment move around. ? Do not touch the ointment or dropper tip to your eyelashes or any other surface. · Do not share towels, pillows, or washcloths while you have pinkeye. When should you call for help? Call your doctor now or seek immediate medical care if:    · You have pain in your eye, not just irritation on the surface.     · You have a change in vision or loss of vision.     · You have an increase in discharge from the eye.     · Your eye has not started to improve or begins to get worse within 48 hours after you start using antibiotics.      · Pinkeye lasts longer than 7 days.    Watch closely for changes in your health, and be sure to contact your doctor if you have any problems. Where can you learn more? Go to https://Sphera CorporationpedandyjeremieFoxGuard Solutions.Product World. org and sign in to your Moz account. Enter Y392 in the [facility] box to learn more about \"Pinkeye: Care Instructions. \"     If you do not have an account, please click on the \"Sign Up Now\" link. Current as of: September 23, 2018  Content Version: 12.0  © 4448-1491 [employer]. Care instructions adapted under license by [facility] ([facility]). If you have questions about a medical condition or this instruction, always ask your healthcare professional. [address] any warranty or liability for your use of this information. Patient Education        Sinusitis: Care Instructions  Your Care Instructions    Sinusitis is an infection of the lining of the sinus cavities in your head. Sinusitis often follows a cold. It causes pain and pressure in your head and face. In most cases, sinusitis gets better on its own in 1 to 2 weeks. But some mild symptoms may last for several weeks. Sometimes antibiotics are needed. Follow-up care is a key part of your treatment and safety. Be sure to make and go to all appointments, and call your doctor if you are having problems. It's also a good idea to know your test results and keep a list of the medicines you take. How can you care for yourself at home? · Take an over-the-counter pain medicine, such as acetaminophen (Tylenol), ibuprofen (Advil, Motrin), or naproxen (Aleve). Read and follow all instructions on the label. · If the doctor prescribed antibiotics, take them as directed. Do not stop taking them just because you feel better. You need to take the full course of antibiotics. · Be careful when taking over-the-counter cold or flu medicines and Tylenol at the same time.  Many of these medicines have acetaminophen, which is Tylenol. Read the labels to make sure that you are not taking more than the recommended dose. Too much acetaminophen (Tylenol) can be harmful. · Breathe warm, moist air from a steamy shower, a hot bath, or a sink filled with hot water. Avoid cold, dry air. Using a humidifier in your home may help. Follow the directions for cleaning the machine. · Use saline (saltwater) nasal washes to help keep your nasal passages open and wash out mucus and bacteria. You can buy saline nose drops at a grocery store or drugstore. Or you can make your own at home by adding 1 teaspoon of salt and 1 teaspoon of baking soda to 2 cups of distilled water. If you make your own, fill a bulb syringe with the solution, insert the tip into your nostril, and squeeze gently. Janine Barnacle your nose. · Put a hot, wet towel or a warm gel pack on your face 3 or 4 times a day for 5 to 10 minutes each time. · Try a decongestant nasal spray like oxymetazoline (Afrin). Do not use it for more than 3 days in a row. Using it for more than 3 days can make your congestion worse. When should you call for help? Call your doctor now or seek immediate medical care if:    · You have new or worse swelling or redness in your face or around your eyes.     · You have a new or higher fever.    Watch closely for changes in your health, and be sure to contact your doctor if:    · You have new or worse facial pain.     · The mucus from your nose becomes thicker (like pus) or has new blood in it.     · You are not getting better as expected. Where can you learn more? Go to https://"Scrypt[employer] Inc".Visibiz. org and sign in to your Telogis account. Enter H771 in the [employer] box to learn more about \"Sinusitis: Care Instructions. \"     If you do not have an account, please click on the \"Sign Up Now\" link. Current as of: October 21, 2018  Content Version: 12.0  © 1276-7506 Healthwise, Incorporated.  Care instructions adapted under license by [facility]. If you have questions about a medical condition or this instruction, always ask your healthcare professional. [place] [address] any warranty or liability for your use of this information. Patient/Caregiver instructed on use, benefit, and side effects of prescribed medications. All patient/parent/caregiver questions answered. Patient/parent/caregiver voiced understanding. Reviewed health maintenance. Instructed to continue current medications, diet and exercise. Patient agreed with treatment plan. Follow up as directed.            Electronically signed by DAWIT Sims CNP on6/27/2019

## 2019-06-27 NOTE — PATIENT INSTRUCTIONS
Polytrim eye drops. Take the prednisone with food, preferably first thing in the morning. Zyrtec daily. Doxycycline as directed. Note for today and  Tomorrow. Follow up with primary care provider in 1 to 2 days. Patient Education        Pinkeye: Care Instructions  Your Care Instructions    Pinkeye is redness and swelling of the eye surface and the conjunctiva (the lining of the eyelid and the covering of the white part of the eye). Pinkeye is also called conjunctivitis. Pinkeye is often caused by infection with bacteria or a virus. Dry air, allergies, smoke, and chemicals are other common causes. Pinkeye often clears on its own in 7 to 10 days. Antibiotics only help if the pinkeye is caused by bacteria. Pinkeye caused by infection spreads easily. If an allergy or chemical is causing pinkeye, it will not go away unless you can avoid whatever is causing it. Follow-up care is a key part of your treatment and safety. Be sure to make and go to all appointments, and call your doctor if you are having problems. It's also a good idea to know your test results and keep a list of the medicines you take. How can you care for yourself at home? · Wash your hands often. Always wash them before and after you treat pinkeye or touch your eyes or face. · Use moist cotton or a clean, wet cloth to remove crust. Wipe from the inside corner of the eye to the outside. Use a clean part of the cloth for each wipe. · Put cold or warm wet cloths on your eye a few times a day if the eye hurts. · Do not wear contact lenses or eye makeup until the pinkeye is gone. Throw away any eye makeup you were using when you got pinkeye. Clean your contacts and storage case. If you wear disposable contacts, use a new pair when your eye has cleared and it is safe to wear contacts again. · If the doctor gave you antibiotic ointment or eyedrops, use them as directed.  Use the medicine for as long as instructed, even if your eye starts looking better soon. Keep the bottle tip clean, and do not let it touch the eye area. · To put in eyedrops or ointment:  ? Tilt your head back, and pull your lower eyelid down with one finger. ? Drop or squirt the medicine inside the lower lid. ? Close your eye for 30 to 60 seconds to let the drops or ointment move around. ? Do not touch the ointment or dropper tip to your eyelashes or any other surface. · Do not share towels, pillows, or washcloths while you have pinkeye. When should you call for help? Call your doctor now or seek immediate medical care if:    · You have pain in your eye, not just irritation on the surface.     · You have a change in vision or loss of vision.     · You have an increase in discharge from the eye.     · Your eye has not started to improve or begins to get worse within 48 hours after you start using antibiotics.     · Pinkeye lasts longer than 7 days.    Watch closely for changes in your health, and be sure to contact your doctor if you have any problems. Where can you learn more? Go to https://Beijing Booksir."Nouvou, Inc.". org and sign in to your Afrifresh Group account. Enter Y392 in the Mac[employer] box to learn more about \"Pinkeye: Care Instructions. \"     If you do not have an account, please click on the \"Sign Up Now\" link. Current as of: September 23, 2018  Content Version: 12.0  © 4329-9269 [employer]. Care instructions adapted under license by [facility] ([facility]). If you have questions about a medical condition or this instruction, always ask your healthcare professional. [place] [address] any warranty or liability for your use of this information. Patient Education        Sinusitis: Care Instructions  Your Care Instructions    Sinusitis is an infection of the lining of the sinus cavities in your head. Sinusitis often follows a cold. It causes pain and pressure in your head and face.   In most cases, sinusitis gets better on its own in 1 to 2 weeks. But some mild symptoms may last for several weeks. Sometimes antibiotics are needed. Follow-up care is a key part of your treatment and safety. Be sure to make and go to all appointments, and call your doctor if you are having problems. It's also a good idea to know your test results and keep a list of the medicines you take. How can you care for yourself at home? · Take an over-the-counter pain medicine, such as acetaminophen (Tylenol), ibuprofen (Advil, Motrin), or naproxen (Aleve). Read and follow all instructions on the label. · If the doctor prescribed antibiotics, take them as directed. Do not stop taking them just because you feel better. You need to take the full course of antibiotics. · Be careful when taking over-the-counter cold or flu medicines and Tylenol at the same time. Many of these medicines have acetaminophen, which is Tylenol. Read the labels to make sure that you are not taking more than the recommended dose. Too much acetaminophen (Tylenol) can be harmful. · Breathe warm, moist air from a steamy shower, a hot bath, or a sink filled with hot water. Avoid cold, dry air. Using a humidifier in your home may help. Follow the directions for cleaning the machine. · Use saline (saltwater) nasal washes to help keep your nasal passages open and wash out mucus and bacteria. You can buy saline nose drops at a grocery store or drugstore. Or you can make your own at home by adding 1 teaspoon of salt and 1 teaspoon of baking soda to 2 cups of distilled water. If you make your own, fill a bulb syringe with the solution, insert the tip into your nostril, and squeeze gently. Betsy Layne Morn your nose. · Put a hot, wet towel or a warm gel pack on your face 3 or 4 times a day for 5 to 10 minutes each time. · Try a decongestant nasal spray like oxymetazoline (Afrin). Do not use it for more than 3 days in a row. Using it for more than 3 days can make your congestion worse.   When should you call for help?  Call your doctor now or seek immediate medical care if:    · You have new or worse swelling or redness in your face or around your eyes.     · You have a new or higher fever.    Watch closely for changes in your health, and be sure to contact your doctor if:    · You have new or worse facial pain.     · The mucus from your nose becomes thicker (like pus) or has new blood in it.     · You are not getting better as expected. Where can you learn more? Go to https://Infracommerce.EquityZen. org and sign in to your [employer] account. Enter N382 in the [employer]t [employer]mes box to learn more about \"Sinusitis: Care Instructions. \"     If you do not have an account, please click on the \"Sign Up Now\" link. Current as of: October 21, 2018  Content Version: 12.0  © 2780-4190 Healthwise, Incorporated. Care instructions adapted under license by [facility] ([facility]). If you have questions about a medical condition or this instruction, always ask your healthcare professional. [place] [address] any warranty or liability for your use of this information.

## 2020-01-22 ENCOUNTER — OFFICE VISIT (OUTPATIENT)
Dept: FAMILY MEDICINE CLINIC | Age: 45
End: 2020-01-22
Payer: COMMERCIAL

## 2020-01-22 VITALS
WEIGHT: 293.6 LBS | BODY MASS INDEX: 41.1 KG/M2 | TEMPERATURE: 97.3 F | DIASTOLIC BLOOD PRESSURE: 94 MMHG | HEART RATE: 84 BPM | HEIGHT: 71 IN | SYSTOLIC BLOOD PRESSURE: 140 MMHG | OXYGEN SATURATION: 96 %

## 2020-01-22 PROCEDURE — 99214 OFFICE O/P EST MOD 30 MIN: CPT | Performed by: NURSE PRACTITIONER

## 2020-01-22 PROCEDURE — 99212 OFFICE O/P EST SF 10 MIN: CPT

## 2020-01-22 PROCEDURE — G8417 CALC BMI ABV UP PARAM F/U: HCPCS | Performed by: NURSE PRACTITIONER

## 2020-01-22 PROCEDURE — G8427 DOCREV CUR MEDS BY ELIG CLIN: HCPCS | Performed by: NURSE PRACTITIONER

## 2020-01-22 PROCEDURE — G8484 FLU IMMUNIZE NO ADMIN: HCPCS | Performed by: NURSE PRACTITIONER

## 2020-01-22 PROCEDURE — 4004F PT TOBACCO SCREEN RCVD TLK: CPT | Performed by: NURSE PRACTITIONER

## 2020-01-22 RX ORDER — HYDROXYZINE PAMOATE 50 MG/1
CAPSULE ORAL
COMMUNITY
Start: 2019-12-31 | End: 2022-02-15

## 2020-01-22 RX ORDER — ALBUTEROL SULFATE 90 UG/1
2 AEROSOL, METERED RESPIRATORY (INHALATION) EVERY 4 HOURS PRN
Qty: 1 INHALER | Refills: 0 | Status: SHIPPED | OUTPATIENT
Start: 2020-01-22 | End: 2020-03-18 | Stop reason: SDUPTHER

## 2020-01-22 RX ORDER — LOSARTAN POTASSIUM 100 MG/1
100 TABLET ORAL DAILY
Qty: 30 TABLET | Refills: 0 | Status: SHIPPED | OUTPATIENT
Start: 2020-01-22 | End: 2020-03-18 | Stop reason: SDUPTHER

## 2020-01-22 RX ORDER — HYDROCHLOROTHIAZIDE 25 MG/1
25 TABLET ORAL EVERY MORNING
Qty: 30 TABLET | Refills: 0 | Status: SHIPPED | OUTPATIENT
Start: 2020-01-22 | End: 2020-03-18 | Stop reason: SDUPTHER

## 2020-01-22 RX ORDER — CLOTRIMAZOLE 1 %
CREAM (GRAM) TOPICAL
Qty: 1 TUBE | Refills: 0 | Status: SHIPPED | OUTPATIENT
Start: 2020-01-22 | End: 2020-10-06

## 2020-01-22 ASSESSMENT — PATIENT HEALTH QUESTIONNAIRE - PHQ9
2. FEELING DOWN, DEPRESSED OR HOPELESS: 0
1. LITTLE INTEREST OR PLEASURE IN DOING THINGS: 0
SUM OF ALL RESPONSES TO PHQ9 QUESTIONS 1 & 2: 0
SUM OF ALL RESPONSES TO PHQ QUESTIONS 1-9: 0
SUM OF ALL RESPONSES TO PHQ QUESTIONS 1-9: 0

## 2020-01-22 ASSESSMENT — ENCOUNTER SYMPTOMS
COUGH: 1
SHORTNESS OF BREATH: 0
SORE THROAT: 1

## 2020-01-22 NOTE — PROGRESS NOTES
[address], APRN-CNP  [address]  Phone:  [phone]  Fax:  [phone]  Neil Mancia is a 38 y.o. male who presents today for his medical conditions/complaints as noted below. Joshua A McQuillin c/o of Congestion (Cough with congestion, runny nose with green drainage x3 days.); Hypertension (Needs meds refilled. Was incarcerated for a couple months and missed appt.); and Rash (Round rash on right upper arm, left foot.)      HPI:     Hypertension   Chronicity: needs refills - Was incarcerated. Unable to  cozaar at the pharmacy in [facility] . Wants sent to [facility] aid. Pertinent negatives include no chest pain or shortness of breath. Compliance problems include psychosocial issues. Rash   This is a new problem. The current episode started more than 1 month ago. The problem is unchanged. The affected locations include the right arm and left foot. The rash is characterized by redness and scaling. Associated symptoms include coughing and a sore throat. Pertinent negatives include no fever or shortness of breath. Past treatments include nothing. His past medical history is significant for asthma. Cough   This is a new (4 days) problem. The current episode started in the past 7 days. The cough is productive of sputum. Associated symptoms include a rash and a sore throat. Pertinent negatives include no chest pain, fever or shortness of breath. Risk factors for lung disease include smoking/tobacco exposure and animal exposure. He has tried nothing for the symptoms. His past medical history is significant for asthma. There is no history of bronchitis or pneumonia.        Wt Readings from Last 3 Encounters:   01/22/20 293 lb 9.6 oz (133.2 kg)   05/22/19 285 lb 9.6 oz (129.5 kg)   04/15/19 279 lb 9.6 oz (126.8 kg)       Temp Readings from Last 3 Encounters:   01/22/20 97.3 °F (36.3 °C) (Tympanic)   06/27/19 97.4 °F (36.3 °C) (Tympanic)   05/22/19 distress. Breath sounds: Rhonchi (right upper only, cleared with cough) present. Musculoskeletal: Normal range of motion. Skin:     General: Skin is warm and dry. Capillary Refill: Capillary refill takes less than 2 seconds. Coloration: Skin is not pale. Findings: Erythema and rash present. Rash is scaling. Neurological:      Mental Status: He is alert and oriented to person, place, and time. Psychiatric:         Mood and Affect: Mood normal.         Speech: Speech normal.         Behavior: Behavior normal.         Thought Content: Thought content normal.         Judgment: Judgment normal.         Assessment:      Diagnosis Orders   1. Essential hypertension  losartan (COZAAR) 100 MG tablet    hydrochlorothiazide (HYDRODIURIL) 25 MG tablet   2. Tinea corporis  clotrimazole (LOTRIMIN) 1 % cream   3. Bronchitis  albuterol sulfate  (90 Base) MCG/ACT inhaler   4. Wheezing  albuterol sulfate  (90 Base) MCG/ACT inhaler     No results found for this visit on 01/22/20. Plan:               Restart cozaar and hydrochlorothiazide. Clotrimazole as directed. Use for 1 weeks after gone. Albuterol 2 puffs every 4 to 6 hours as needed for cough, wheeze or shortness of breath. Note for today. Follow up with primary care provider in 1 to 2 days if needed. Patient Instructions     Restart cozaar and hydrochlorothiazide. Clotrimazole as directed. Use for 1 weeks after gone. Albuterol 2 puffs every 4 to 6 hours as needed for cough, wheeze or shortness of breath. Note for today. Follow up with primary care provider in 1 to 2 days if needed. Patient Education        Bronchitis: Care Instructions  Your Care Instructions    Bronchitis is inflammation of the bronchial tubes, which carry air to the lungs. The tubes swell and produce mucus, or phlegm. The mucus and inflamed bronchial tubes make you cough. You may have trouble breathing.   Most cases of bronchitis are new or higher fever.     · You have a new rash.    Watch closely for changes in your health, and be sure to contact your doctor if:    · You cough more deeply or more often, especially if you notice more mucus or a change in the color of your mucus.     · You are not getting better as expected. Where can you learn more? Go to https://chpedandyewjose.[facility]. org and sign in to your FriendsClear account. Enter H333 in the [employer] box to learn more about \"Bronchitis: Care Instructions. \"     If you do not have an account, please click on the \"Sign Up Now\" link. Current as of: June 9, 2019  Content Version: 12.3  © 2447-9280 [employer]. Care instructions adapted under license by [facility] ([facility]). If you have questions about a medical condition or this instruction, always ask your healthcare professional. [place] [address] any warranty or liability for your use of this information. Patient Education        High Blood Pressure: Care Instructions  Overview    It's normal for blood pressure to go up and down throughout the day. But if it stays up, you have high blood pressure. Another name for high blood pressure is hypertension. Despite what a lot of people think, high blood pressure usually doesn't cause headaches or make you feel dizzy or lightheaded. It usually has no symptoms. But it does increase your risk of stroke, heart attack, and other problems. You and your doctor will talk about your risks of these problems based on your blood pressure. Your doctor will give you a goal for your blood pressure. Your goal will be based on your health and your age. Lifestyle changes, such as eating healthy and being active, are always important to help lower blood pressure. You might also take medicine to reach your blood pressure goal.  Follow-up care is a key part of your treatment and safety.  Be sure to make and go to all appointments, and call your doctor if you are the top number is higher or the bottom number is higher, or both.     · You think you may be having side effects from your blood pressure medicine. Where can you learn more? Go to https://DraftMixpeJambool.Pricebets. org and sign in to your [employer] account. Enter B168 in the [employer] box to learn more about \"High Blood Pressure: Care Instructions. \"     If you do not have an account, please click on the \"Sign Up Now\" link. Current as of: April 9, 2019  Content Version: 12.3  © 0617-4610 [employer]. Care instructions adapted under license by [facility] ([facility]). If you have questions about a medical condition or this instruction, always ask your healthcare professional. [address] any warranty or liability for your use of this information. Patient Education        Ringworm: Care Instructions  Your Care Instructions  Ringworm is a fungus infection of the skin. It is not caused by a worm. Ringworm causes a round, scaly rash that may crack and itch. The rash can spread over a wide area. One type of fungus that causes ringworm is often found in locker rooms and swimming pools. It grows well in warm, moist areas of the skin, such as in skin folds. You can get ringworm by sharing towels, clothing, and sports equipment. You can also get it by touching someone who has ringworm. Ringworm is treated with cream that kills the fungus. If the rash is widespread, you may need pills to get rid of it. Ringworm often comes back after treatment. If the rash becomes infected with bacteria, you may need antibiotics. Follow-up care is a key part of your treatment and safety. Be sure to make and go to all appointments, and call your doctor if you are having problems. It's also a good idea to know your test results and keep a list of the medicines you take. How can you care for yourself at home? · Take your medicines exactly as prescribed.  Call your doctor if you have any problems with your medicine. · Wash the rash with soap and water, remove flaky skin, and dry thoroughly. · Try an over-the-counter cream with clotrimazole or miconazole in it. Brand names include Lotrimin, Micatin, and Tinactin. Terbinafine cream (Lamisil) is also available without a prescription. Spread the cream beyond the edge or border of the rash. Follow the directions on the package. Do not stop using the medicine just because your skin clears up. You will probably need to continue treatment for 2 to 4 weeks. · To keep from getting another infection:  ? Do not go barefoot in public places such as gyms or locker rooms. Avoid sharing towels and clothes. Use flip-flops or some other type of shoe in the shower. ? Do not wear tight clothes or let your skin stay damp for long periods, such as by staying in a wet bathing suit or sweaty clothes. When should you call for help? Call your doctor now or seek immediate medical care if:    · You have signs of infection such as:  ? Pain, warmth, or swelling in your skin. ? Red streaks near a wound in the skin. ? Pus coming from the rash on your skin. ? A fever.    Watch closely for changes in your health, and be sure to contact your doctor if:    · Your ringworm does not improve after 2 weeks of treatment.     · You do not get better as expected. Where can you learn more? Go to https://wywypeBoomerang.[employer] org and sign in to your [employer] account. Enter O078 in the [facility] box to learn more about \"Ringworm: Care Instructions. \"     If you do not have an account, please click on the \"Sign Up Now\" link. Current as of: April 1, 2019  Content Version: 12.3  © 7087-6877 Health[employer], [employer]. Care instructions adapted under license by [facility] ([facility]).  If you have questions about a medical condition or this instruction, always ask your healthcare professional. [address]  any warranty or liability for your use of this

## 2020-03-18 ENCOUNTER — OFFICE VISIT (OUTPATIENT)
Dept: FAMILY MEDICINE CLINIC | Age: 45
End: 2020-03-18
Payer: COMMERCIAL

## 2020-03-18 VITALS
TEMPERATURE: 96.4 F | BODY MASS INDEX: 42.11 KG/M2 | WEIGHT: 299.8 LBS | SYSTOLIC BLOOD PRESSURE: 138 MMHG | DIASTOLIC BLOOD PRESSURE: 78 MMHG | RESPIRATION RATE: 16 BRPM | OXYGEN SATURATION: 98 % | HEART RATE: 99 BPM

## 2020-03-18 PROCEDURE — 4004F PT TOBACCO SCREEN RCVD TLK: CPT | Performed by: NURSE PRACTITIONER

## 2020-03-18 PROCEDURE — G8427 DOCREV CUR MEDS BY ELIG CLIN: HCPCS | Performed by: NURSE PRACTITIONER

## 2020-03-18 PROCEDURE — 99213 OFFICE O/P EST LOW 20 MIN: CPT | Performed by: NURSE PRACTITIONER

## 2020-03-18 PROCEDURE — 99213 OFFICE O/P EST LOW 20 MIN: CPT

## 2020-03-18 PROCEDURE — G8484 FLU IMMUNIZE NO ADMIN: HCPCS | Performed by: NURSE PRACTITIONER

## 2020-03-18 PROCEDURE — G8417 CALC BMI ABV UP PARAM F/U: HCPCS | Performed by: NURSE PRACTITIONER

## 2020-03-18 RX ORDER — ALBUTEROL SULFATE 90 UG/1
2 AEROSOL, METERED RESPIRATORY (INHALATION) EVERY 4 HOURS PRN
Qty: 1 INHALER | Refills: 3 | Status: SHIPPED | OUTPATIENT
Start: 2020-03-18 | End: 2021-02-25 | Stop reason: SDUPTHER

## 2020-03-18 RX ORDER — HYDROCHLOROTHIAZIDE 25 MG/1
25 TABLET ORAL EVERY MORNING
Qty: 30 TABLET | Refills: 3 | Status: SHIPPED | OUTPATIENT
Start: 2020-03-18 | End: 2020-11-17 | Stop reason: SDUPTHER

## 2020-03-18 RX ORDER — NAPROXEN 500 MG/1
500 TABLET ORAL 2 TIMES DAILY
Qty: 60 TABLET | Refills: 5 | Status: SHIPPED | OUTPATIENT
Start: 2020-03-18 | End: 2020-11-17

## 2020-03-18 RX ORDER — LOSARTAN POTASSIUM 100 MG/1
100 TABLET ORAL DAILY
Qty: 30 TABLET | Refills: 3 | Status: SHIPPED | OUTPATIENT
Start: 2020-03-18 | End: 2020-11-17 | Stop reason: SDUPTHER

## 2020-03-18 ASSESSMENT — ENCOUNTER SYMPTOMS
COUGH: 0
CHOKING: 0

## 2020-03-18 NOTE — PROGRESS NOTES
capsule Take 2 capsules by mouth daily Yes Historical Provider, MD   clotrimazole (LOTRIMIN) 1 % cream Apply topically 2 times daily. Patient not taking: Reported on 3/18/2020  DAWIT Lopez CNP   nicotine polacrilex (NICORETTE) 4 MG gum Take 1 each by mouth as needed for Smoking cessation  Patient not taking: Reported on 1/22/2020  DAWIT Ng CNP   QUEtiapine (SEROQUEL) 50 MG tablet Take 1 tablet by mouth daily  Historical Provider, MD   naproxen (NAPROSYN) 500 MG tablet Take 1 tablet by mouth 2 times daily  Patient not taking: Reported on 1/22/2020  DAWIT Ng CNP        Social History     Tobacco Use    Smoking status: Current Every Day Smoker     Types: Cigarettes    Smokeless tobacco: Current User    Tobacco comment: one pack a day   Substance Use Topics    Alcohol use: Not Currently     Frequency: Never        Vitals:    03/18/20 1126 03/18/20 1127   BP: (!) 150/90 138/78   Site: Right Upper Arm Right Upper Arm   Position: Sitting Sitting   Cuff Size: Large Adult Large Adult   Pulse: 99    Resp: 16    Temp: 96.4 °F (35.8 °C)    TempSrc: Tympanic    SpO2: 98%    Weight: 299 lb 12.8 oz (136 kg)      Estimated body mass index is 42.11 kg/m² as calculated from the following:    Height as of 1/22/20: 5' 10.75\" (1.797 m). Weight as of this encounter: 299 lb 12.8 oz (136 kg). Physical Exam  Vitals signs and nursing note reviewed. Constitutional:       Appearance: Normal appearance. He is normal weight. HENT:      Head: Normocephalic and atraumatic. Eyes:      Conjunctiva/sclera: Conjunctivae normal.   Cardiovascular:      Rate and Rhythm: Normal rate and regular rhythm. Pulmonary:      Effort: Pulmonary effort is normal.      Breath sounds: Normal breath sounds. Skin:     General: Skin is warm and dry. Neurological:      General: No focal deficit present. Mental Status: He is alert and oriented to person, place, and time. Mental status is at baseline. 1 Inhaler; Refill: 3    Return in about 1 month (around 4/18/2020), or if symptoms worsen or fail to improve, for recheck and fasting labs.     Orders Placed This Encounter   Medications    metoprolol tartrate (LOPRESSOR) 25 MG tablet     Sig: Take 1 tablet by mouth 2 times daily     Dispense:  60 tablet     Refill:  3    losartan (COZAAR) 100 MG tablet     Sig: Take 1 tablet by mouth daily     Dispense:  30 tablet     Refill:  3     Maximum Refills Reached    hydroCHLOROthiazide (HYDRODIURIL) 25 MG tablet     Sig: Take 1 tablet by mouth every morning     Dispense:  30 tablet     Refill:  3    naproxen (NAPROSYN) 500 MG tablet     Sig: Take 1 tablet by mouth 2 times daily     Dispense:  60 tablet     Refill:  5    albuterol sulfate  (90 Base) MCG/ACT inhaler     Sig: Inhale 2 puffs into the lungs every 4 hours as needed for Wheezing or Shortness of Breath (cough)     Dispense:  1 Inhaler     Refill:  3     Orders Placed This Encounter   Procedures    Lipid Panel     Standing Status:   Future     Standing Expiration Date:   3/18/2021     Order Specific Question:   Is Patient Fasting?/# of Hours     Answer:   12 hours    CBC Auto Differential     Standing Status:   Future     Standing Expiration Date:   3/18/2021    Comprehensive Metabolic Panel     Standing Status:   Future     Standing Expiration Date:   3/18/2021    TSH without Reflex     Standing Status:   Future     Standing Expiration Date:   3/18/2021    T4, Free     Standing Status:   Future     Standing Expiration Date:   3/18/2021   [address]     Referral Priority:   Routine     Referral Type:   Eval and Treat     Referral Reason:   Specialty Services Required     Requested Specialty:   Sleep Medicine     Number of Visits Requested:   1    Baseline Diagnostic Sleep Study     Standing Status:   Future     Standing Expiration Date:   3/18/2021     Order Specific Question:   Adult or Pediatric     Answer:   Adult

## 2020-03-23 ENCOUNTER — TELEPHONE (OUTPATIENT)
Dept: SLEEP CENTER | Age: 45
End: 2020-03-23

## 2020-09-11 ENCOUNTER — TELEPHONE (OUTPATIENT)
Dept: FAMILY MEDICINE CLINIC | Age: 45
End: 2020-09-11

## 2020-09-11 ENCOUNTER — TELEPHONE (OUTPATIENT)
Dept: SLEEP CENTER | Age: 45
End: 2020-09-11

## 2020-09-11 NOTE — TELEPHONE ENCOUNTER
[facility] contacted the office stating that they have tried to contact the patient several times and they have not been able to be reached.  Wanted you to know that they are going to close the referral.

## 2020-10-06 ENCOUNTER — OFFICE VISIT (OUTPATIENT)
Dept: FAMILY MEDICINE CLINIC | Age: 45
End: 2020-10-06
Payer: COMMERCIAL

## 2020-10-06 VITALS — BODY MASS INDEX: 40.73 KG/M2 | HEART RATE: 97 BPM | WEIGHT: 290 LBS | OXYGEN SATURATION: 99 %

## 2020-10-06 PROCEDURE — 4004F PT TOBACCO SCREEN RCVD TLK: CPT | Performed by: INTERNAL MEDICINE

## 2020-10-06 PROCEDURE — G8427 DOCREV CUR MEDS BY ELIG CLIN: HCPCS | Performed by: INTERNAL MEDICINE

## 2020-10-06 PROCEDURE — G8484 FLU IMMUNIZE NO ADMIN: HCPCS | Performed by: INTERNAL MEDICINE

## 2020-10-06 PROCEDURE — G8417 CALC BMI ABV UP PARAM F/U: HCPCS | Performed by: INTERNAL MEDICINE

## 2020-10-06 PROCEDURE — 99214 OFFICE O/P EST MOD 30 MIN: CPT

## 2020-10-06 PROCEDURE — 99214 OFFICE O/P EST MOD 30 MIN: CPT | Performed by: INTERNAL MEDICINE

## 2020-10-06 RX ORDER — HYDROCODONE BITARTRATE AND ACETAMINOPHEN 10; 325 MG/1; MG/1
1 TABLET ORAL EVERY 8 HOURS PRN
Qty: 60 TABLET | Refills: 0 | Status: SHIPPED | OUTPATIENT
Start: 2020-10-06 | End: 2020-11-05

## 2020-10-06 RX ORDER — CLOTRIMAZOLE 1 %
CREAM (GRAM) TOPICAL 2 TIMES DAILY
Qty: 1 TUBE | Refills: 0 | Status: SHIPPED | OUTPATIENT
Start: 2020-10-06 | End: 2020-11-17 | Stop reason: SDUPTHER

## 2020-10-06 NOTE — PROGRESS NOTES
[address]  [address]  Dept: [phone]  Dept Fax: [phone]  Loc: [phone]     Visit Date:  10/6/2020    Patient:  Gerhardt Sandifer  YOB: 1975    HPI:   Gerhardt Sandifer presents today for   Chief Complaint   Patient presents with   Mio Vance    Shoulder Pain     patient is here today to discuss his left shoulder pain. he did fall over a month ago but still has a lot of pain.  Knee Pain     patient also hurt his knee when he fell. Jam Haines HPI 67-year-old male with a history of obesity BMI 40, hypertension, smoking abuse, bipolar disorder is coming in with with a one-month history of worsening left shoulder pain as well as right knee pain. Left shoulder Pain-Patient reports almost a month ago he fell on his left shoulder and right knee and since then he has been having excruciating pain with no relief of his symptoms. He feels like the left shoulder is the worst it feels more stiff -he has restricted range of motion in all directions even doing simple ADLs like putting his blanket on getting his clothes on has been extremely hard. He tried taking lots of ibuprofen over-the-counter 800 mg/ lidocaine and other over-the-counter creams and treatments with no relief of his symptoms. Taking lidocaine that would last for an hour or 2 and the pain would come back on again. He feels like he might have torn his ligaments or had some form of rotator cuff injury or pathology going on his shoulders. He does report sometime he hears popping sounds when he moves his shoulder and on the day of the injury he did hear snapping sound. He was seen in the ED and had x-rays done of his shoulder that was negative for any acute pathology. No numbness/tingling but sometimes does feel weak.  He has really hard time sleeping on the shoulder and is tossing and turning all the time. No referred pain coming from the neck. The pain is localized along the deltoid lateral shoulder area, sometimes pain is in anterior shoulder and around the acromioclavicular as well as glenohumeral joint. There was no prodromal symptoms like more like mechanical fall where he said that he was running after a bat that he noticed flying in his room and he tripped and fell while holding a tennis racquet on his left shoulder and right knee. In Terms of his right knee he says he has noticed more swollen but he does not have any restriction of motion he has full mobility. He does reports having history of arthritis in his knees and spine. Medications  Prior to Visit Medications    Medication Sig Taking? Authorizing Provider   losartan (COZAAR) 100 MG tablet Take 1 tablet by mouth daily Yes DAWIT Leary CNP   hydroCHLOROthiazide (HYDRODIURIL) 25 MG tablet Take 1 tablet by mouth every morning Yes DAWIT Leary CNP   albuterol sulfate  (90 Base) MCG/ACT inhaler Inhale 2 puffs into the lungs every 4 hours as needed for Wheezing or Shortness of Breath (cough) Yes DAWIT Leary CNP   hydrOXYzine (VISTARIL) 50 MG capsule take 1 capsule by mouth at bedtime Yes Historical Provider, MD   OXcarbazepine (TRILEPTAL) 300 MG tablet Take 1 tablet by mouth daily Yes Historical Provider, MD   prazosin (MINIPRESS) 2 MG capsule Take 2 capsules by mouth daily Yes Historical Provider, MD   metoprolol tartrate (LOPRESSOR) 25 MG tablet Take 1 tablet by mouth 2 times daily  DAWIT Leary CNP   naproxen (NAPROSYN) 500 MG tablet Take 1 tablet by mouth 2 times daily  Patient not taking: Reported on 10/6/2020  DAWIT Leary CNP   clotrimazole (LOTRIMIN) 1 % cream Apply topically 2 times daily.   Patient not taking: Reported on 3/18/2020  DAWIT Thomas NP   nicotine polacrilex (NICORETTE) 4 MG gum Take 1 each by mouth as needed for Smoking cessation  Patient not taking: Reported on 1/22/2020  Guilherme Centeno, APRN - CNP        Allergies:  is allergic to other and pcn [penicillins]. Past Medical History:   has a past medical history of Bipolar 1 disorder ([facility] [address] History of heroin abuse ([facility] [address] Hypertension, and Personality disorder ([facility] [address] Past Surgical History   has a past surgical history that includes knee surgery (Left) and Finger surgery (Left). Family History  family history includes Cancer in his father; Diabetes in his mother; Heart Disease in his father; High Blood Pressure in his father and mother; Kidney Disease in his mother. Social History   reports that he has been smoking cigarettes. He uses smokeless tobacco. He reports previous alcohol use. Health Maintenance:    Health Maintenance   Topic Date Due    Pneumococcal 0-64 years Vaccine (1 of 1 - PPSV23) 06/22/1982    HIV screen  06/22/1991    DTaP/Tdap/Td vaccine (1 - Tdap) 06/22/1995    Diabetes screen  06/22/2016    Potassium monitoring  02/12/2020    Creatinine monitoring  02/12/2020    Flu vaccine (1) 09/01/2020    Lipid screen  02/12/2024    Hepatitis A vaccine  Aged Out    Hepatitis B vaccine  Aged Out    Hib vaccine  Aged Out    Meningococcal (ACWY) vaccine  Aged Out       Subjective:      Review of Systems   Constitutional: Negative for fatigue, fever and unexpected weight change. HENT: Negative for ear pain, postnasal drip, rhinorrhea, sinus pain, sore throat and trouble swallowing. Eyes: Negative for visual disturbance. Respiratory: Negative for cough, chest tightness and shortness of breath. Cardiovascular: Negative for chest pain and leg swelling. Gastrointestinal: Negative for abdominal pain, blood in stool and diarrhea. Endocrine: Negative for polyuria. Genitourinary: Negative for difficulty urinating and flank pain. Musculoskeletal: Positive for arthralgias, joint swelling and myalgias.         Right knee swelling and pain  And left shoulder pain. Skin: Negative for rash. Allergic/Immunologic: Negative for environmental allergies. Neurological: Negative for weakness, light-headedness, numbness and headaches. Hematological: Negative for adenopathy. Psychiatric/Behavioral: Negative for behavioral problems and suicidal ideas. The patient is not nervous/anxious. Objective:     Pulse 97   Wt 290 lb (131.5 kg)   SpO2 99%   BMI 40.73 kg/m²     Physical Exam  Vitals signs and nursing note reviewed. Constitutional:       Appearance: He is obese. HENT:      Head: Normocephalic and atraumatic. Cardiovascular:      Rate and Rhythm: Normal rate and regular rhythm. Pulses: Normal pulses. Heart sounds: Normal heart sounds. Pulmonary:      Breath sounds: No stridor. Abdominal:      Palpations: Abdomen is soft. Musculoskeletal:      Comments: Right knee- crepitations, slight bogginess along the patellofemoral joint. Left Shoulder- diminish range of active and passive range of motion due to the pain, negative sulcus sign, +tenderness along the [facility] and GC joint. +tenderness along the proximal long head biceps tendon at the bicipital groove area. Pulses/strength preserved. Skin:     General: Skin is warm. Findings: Rash present. Comments: Ringworm looking circular erythematous rash noted around right upper extremity. Neurological:      Mental Status: He is oriented to person, place, and time. Mental status is at baseline. Psychiatric:         Mood and Affect: Mood normal.             Assessment       Diagnosis Orders   1. Fall, subsequent encounter  Gaby Dodd MD, Orthopedic Surgery, [place]    MRI SHOULDER LEFT WO CONTRAST    XR KNEE RIGHT (MIN 4 VIEWS)    HYDROcodone-acetaminophen (LORCET HD)  MG per tablet    clotrimazole (LOTRIMIN) 1 % cream   2.  Acute pain of right knee  Gaby Dodd MD, Orthopedic Surgery, [place]    MRI SHOULDER LEFT WO CONTRAST    XR KNEE RIGHT (MIN 4 VIEWS)    HYDROcodone-acetaminophen (LORCET HD)  MG per tablet    clotrimazole (LOTRIMIN) 1 % cream   3. Knee swelling  Ady Winters MD, Orthopedic Surgery, [place]    MRI SHOULDER LEFT WO CONTRAST    XR KNEE RIGHT (MIN 4 VIEWS)    HYDROcodone-acetaminophen (LORCET HD)  MG per tablet    clotrimazole (LOTRIMIN) 1 % cream   4. Acute pain of left shoulder  Ady Winters MD, Orthopedic Surgery, [place]    MRI SHOULDER LEFT WO CONTRAST    XR KNEE RIGHT (MIN 4 VIEWS)    HYDROcodone-acetaminophen (LORCET HD)  MG per tablet    clotrimazole (LOTRIMIN) 1 % cream   5. Ringworm  HYDROcodone-acetaminophen (LORCET HD)  MG per tablet    clotrimazole (LOTRIMIN) 1 % cream         PLAN   Clotrimazole cream prn for probable ringworm/fungal rash noted around the right upper extremity. MRI of the left shoulder-suspecting possible rotator cuff induced tendinopathy/pathology. Encouraged to do some shoulder ROM stretching exercises NSAIDs as needed. Lorcet/narcotics prescribed to the patient today with an understanding that he needs to limit taking these medications as much as possible. I went over some the side effects of narcotics such as not to be combining that with other sedatives like alcohol, impaired cognition,drowsiness/falls, central respiratory depression, overdose, death, tolerance withdrawal, addiction, nausea vomiting, constipation and many others. Orders Placed This Encounter   Procedures    MRI SHOULDER LEFT WO CONTRAST     Standing Status:   Future     Standing Expiration Date:   10/6/2021     Order Specific Question:   Reason for exam:     Answer:   1 month history of left shoulder pain- suspecting rotator cuff injury.     XR KNEE RIGHT (MIN 4 VIEWS)     Standing Status:   Future     Standing Expiration Date:   10/6/2021   Memo Anne MD, Orthopedic Surgery, [place]     Referral Priority:   Routine     Referral Type:   Eval and Treat     Referral Reason:   Specialty Services Required     Referred to Provider:   Yaritza Corrales MD     Requested Specialty:   Orthopedic Surgery     Number of Visits Requested:   1        No follow-ups on file. Patient given educational materials - see patient instructions. Discussed use, benefit, and side effects of prescribed medications. All patient questions answered. Pt voiced understanding. Reviewed health maintenance.        Electronically signed Miriam Luna MD on 10/6/2020 at 3:20 PM EDT

## 2020-10-08 ENCOUNTER — TELEPHONE (OUTPATIENT)
Dept: FAMILY MEDICINE CLINIC | Age: 45
End: 2020-10-08

## 2020-10-08 ASSESSMENT — ENCOUNTER SYMPTOMS
ABDOMINAL PAIN: 0
COUGH: 0
TROUBLE SWALLOWING: 0
SORE THROAT: 0
RHINORRHEA: 0
SINUS PAIN: 0
BLOOD IN STOOL: 0
DIARRHEA: 0
SHORTNESS OF BREATH: 0
CHEST TIGHTNESS: 0

## 2020-10-20 ENCOUNTER — TELEPHONE (OUTPATIENT)
Dept: FAMILY MEDICINE CLINIC | Age: 45
End: 2020-10-20

## 2020-10-20 NOTE — TELEPHONE ENCOUNTER
Tried calling the number for a peer to peer for his MRI denial. After speaking with them the MRI was denied because the patient has not tried PT in the last six months. Spoke with pt and he is willing to try PT but does not understand why he needs to do that. Pt is also scheduled to see ortho on 10/30.

## 2020-10-30 ENCOUNTER — OFFICE VISIT (OUTPATIENT)
Dept: ORTHOPEDIC SURGERY | Age: 45
End: 2020-10-30
Payer: COMMERCIAL

## 2020-10-30 VITALS
DIASTOLIC BLOOD PRESSURE: 110 MMHG | BODY MASS INDEX: 41.29 KG/M2 | SYSTOLIC BLOOD PRESSURE: 180 MMHG | HEART RATE: 90 BPM | OXYGEN SATURATION: 94 % | WEIGHT: 294 LBS

## 2020-10-30 PROBLEM — S80.01XA CONTUSION OF RIGHT KNEE: Status: ACTIVE | Noted: 2020-10-30

## 2020-10-30 PROCEDURE — 4004F PT TOBACCO SCREEN RCVD TLK: CPT | Performed by: ORTHOPAEDIC SURGERY

## 2020-10-30 PROCEDURE — G8417 CALC BMI ABV UP PARAM F/U: HCPCS | Performed by: ORTHOPAEDIC SURGERY

## 2020-10-30 PROCEDURE — 99203 OFFICE O/P NEW LOW 30 MIN: CPT

## 2020-10-30 PROCEDURE — G8484 FLU IMMUNIZE NO ADMIN: HCPCS | Performed by: ORTHOPAEDIC SURGERY

## 2020-10-30 PROCEDURE — G8427 DOCREV CUR MEDS BY ELIG CLIN: HCPCS | Performed by: ORTHOPAEDIC SURGERY

## 2020-10-30 PROCEDURE — 99203 OFFICE O/P NEW LOW 30 MIN: CPT | Performed by: ORTHOPAEDIC SURGERY

## 2020-10-30 RX ORDER — DICLOFENAC SODIUM 75 MG/1
75 TABLET, DELAYED RELEASE ORAL 2 TIMES DAILY
Qty: 28 TABLET | Refills: 0 | Status: SHIPPED | OUTPATIENT
Start: 2020-10-30 | End: 2021-06-09

## 2020-10-30 NOTE — LETTER
10/30/2020    Darian Olivo MD  [place] [address], [address] [facility]    RE: Selma Rolle    Dear Dr. Chauncey Morrell,    Thank you for allowing me to participate in the care of Mr. Maylin Turner. I had the opportunity to evaluate the patient on 10/30/2020. Attached you will find my evaluation and recommendations. Thanks again for the confidence you have expressed in me by allowing my participation in the care of your patient. I will keep you apprised of further developments in the patients treatment course as it progresses. If I can be of further assistance in any fashion, please feel free to contact me at your convenience.     Sincerely,        Dolly Holm  Shoulder and Elbow Surgery
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
## 2020-11-17 ENCOUNTER — VIRTUAL VISIT (OUTPATIENT)
Dept: FAMILY MEDICINE CLINIC | Age: 45
End: 2020-11-17
Payer: COMMERCIAL

## 2020-11-17 PROCEDURE — 99241 PR OFFICE CONSULTATION NEW/ESTAB PATIENT 15 MIN: CPT | Performed by: INTERNAL MEDICINE

## 2020-11-17 RX ORDER — OXYCODONE AND ACETAMINOPHEN 10; 325 MG/1; MG/1
1 TABLET ORAL NIGHTLY PRN
Qty: 60 TABLET | Refills: 0 | Status: SHIPPED | OUTPATIENT
Start: 2020-11-17 | End: 2021-01-16

## 2020-11-17 RX ORDER — HYDROCHLOROTHIAZIDE 25 MG/1
25 TABLET ORAL EVERY MORNING
Qty: 30 TABLET | Refills: 3 | Status: SHIPPED | OUTPATIENT
Start: 2020-11-17 | End: 2021-03-24 | Stop reason: SDUPTHER

## 2020-11-17 RX ORDER — CLOTRIMAZOLE 1 %
CREAM (GRAM) TOPICAL 2 TIMES DAILY
Qty: 1 TUBE | Refills: 0 | Status: SHIPPED | OUTPATIENT
Start: 2020-11-17 | End: 2021-04-29

## 2020-11-17 RX ORDER — LOSARTAN POTASSIUM 100 MG/1
100 TABLET ORAL DAILY
Qty: 30 TABLET | Refills: 3 | Status: SHIPPED | OUTPATIENT
Start: 2020-11-17 | End: 2021-03-24 | Stop reason: SDUPTHER

## 2020-11-17 ASSESSMENT — PATIENT HEALTH QUESTIONNAIRE - PHQ9
SUM OF ALL RESPONSES TO PHQ QUESTIONS 1-9: 0
1. LITTLE INTEREST OR PLEASURE IN DOING THINGS: 0
SUM OF ALL RESPONSES TO PHQ QUESTIONS 1-9: 0
2. FEELING DOWN, DEPRESSED OR HOPELESS: 0
SUM OF ALL RESPONSES TO PHQ9 QUESTIONS 1 & 2: 0
SUM OF ALL RESPONSES TO PHQ QUESTIONS 1-9: 0
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
## 2020-11-19 ASSESSMENT — ENCOUNTER SYMPTOMS
RHINORRHEA: 0
BLOOD IN STOOL: 0
SINUS PAIN: 0
DIARRHEA: 0
SHORTNESS OF BREATH: 0
CHEST TIGHTNESS: 0
ABDOMINAL PAIN: 0
TROUBLE SWALLOWING: 0
SORE THROAT: 0
COUGH: 0

## 2020-12-04 NOTE — PATIENT INSTRUCTIONS
RN NOTES: ANXIETY 

PT.C/O FEELING ANXIOUS ,ATIVAN 1 MG PO PRN GIVEN PER PT. REQUEST,WILL CONTINUE TO MONITOR. Restart cozaar and hydrochlorothiazide. Clotrimazole as directed. Use for 1 weeks after gone. Albuterol 2 puffs every 4 to 6 hours as needed for cough, wheeze or shortness of breath. Note for today. Follow up with primary care provider in 1 to 2 days if needed. Patient Education        Bronchitis: Care Instructions  Your Care Instructions    Bronchitis is inflammation of the bronchial tubes, which carry air to the lungs. The tubes swell and produce mucus, or phlegm. The mucus and inflamed bronchial tubes make you cough. You may have trouble breathing. Most cases of bronchitis are caused by viruses like those that cause colds. Antibiotics usually do not help and they may be harmful. Bronchitis usually develops rapidly and lasts about 2 to 3 weeks in otherwise healthy people. Follow-up care is a key part of your treatment and safety. Be sure to make and go to all appointments, and call your doctor if you are having problems. It's also a good idea to know your test results and keep a list of the medicines you take. How can you care for yourself at home? · Take all medicines exactly as prescribed. Call your doctor if you think you are having a problem with your medicine. · Get some extra rest.  · Take an over-the-counter pain medicine, such as acetaminophen (Tylenol), ibuprofen (Advil, Motrin), or naproxen (Aleve) to reduce fever and relieve body aches. Read and follow all instructions on the label. · Do not take two or more pain medicines at the same time unless the doctor told you to. Many pain medicines have acetaminophen, which is Tylenol. Too much acetaminophen (Tylenol) can be harmful. · Take an over-the-counter cough medicine that contains dextromethorphan to help quiet a dry, hacking cough so that you can sleep. Avoid cough medicines that have more than one active ingredient. Read and follow all instructions on the label.   · Breathe moist air from a humidifier, hot shower, or sink filled with hot it does increase your risk of stroke, heart attack, and other problems. You and your doctor will talk about your risks of these problems based on your blood pressure. Your doctor will give you a goal for your blood pressure. Your goal will be based on your health and your age. Lifestyle changes, such as eating healthy and being active, are always important to help lower blood pressure. You might also take medicine to reach your blood pressure goal.  Follow-up care is a key part of your treatment and safety. Be sure to make and go to all appointments, and call your doctor if you are having problems. It's also a good idea to know your test results and keep a list of the medicines you take. How can you care for yourself at home? Medical treatment  · If you stop taking your medicine, your blood pressure will go back up. You may take one or more types of medicine to lower your blood pressure. Be safe with medicines. Take your medicine exactly as prescribed. Call your doctor if you think you are having a problem with your medicine. · Talk to your doctor before you start taking aspirin every day. Aspirin can help certain people lower their risk of a heart attack or stroke. But taking aspirin isn't right for everyone, because it can cause serious bleeding. · See your doctor regularly. You may need to see the doctor more often at first or until your blood pressure comes down. · If you are taking blood pressure medicine, talk to your doctor before you take decongestants or anti-inflammatory medicine, such as ibuprofen. Some of these medicines can raise blood pressure. · Learn how to check your blood pressure at home. Lifestyle changes  · Stay at a healthy weight. This is especially important if you put on weight around the waist. Losing even 10 pounds can help you lower your blood pressure. · If your doctor recommends it, get more exercise. Walking is a good choice.  Bit by bit, increase the amount you walk every day. Try for at least 30 minutes on most days of the week. You also may want to swim, bike, or do other activities. · Avoid or limit alcohol. Talk to your doctor about whether you can drink any alcohol. · Try to limit how much sodium you eat to less than 2,300 milligrams (mg) a day. Your doctor may ask you to try to eat less than 1,500 mg a day. · Eat plenty of fruits (such as bananas and oranges), vegetables, legumes, whole grains, and low-fat dairy products. · Lower the amount of saturated fat in your diet. Saturated fat is found in animal products such as milk, cheese, and meat. Limiting these foods may help you lose weight and also lower your risk for heart disease. · Do not smoke. Smoking increases your risk for heart attack and stroke. If you need help quitting, talk to your doctor about stop-smoking programs and medicines. These can increase your chances of quitting for good. When should you call for help? Call  911 anytime you think you may need emergency care. This may mean having symptoms that suggest that your blood pressure is causing a serious heart or blood vessel problem. Your blood pressure may be over 180/120.   For example, call  911 if:    · You have symptoms of a heart attack. These may include:  ? Chest pain or pressure, or a strange feeling in the chest.  ? Sweating. ? Shortness of breath. ? Nausea or vomiting. ? Pain, pressure, or a strange feeling in the back, neck, jaw, or upper belly or in one or both shoulders or arms. ? Lightheadedness or sudden weakness. ? A fast or irregular heartbeat.     · You have symptoms of a stroke. These may include:  ? Sudden numbness, tingling, weakness, or loss of movement in your face, arm, or leg, especially on only one side of your body. ? Sudden vision changes. ? Sudden trouble speaking. ? Sudden confusion or trouble understanding simple statements. ? Sudden problems with walking or balance.   ? A sudden, severe headache that is different from past headaches.     · You have severe back or belly pain.    Do not wait until your blood pressure comes down on its own. Get help right away.   Call your doctor now or seek immediate care if:    · Your blood pressure is much higher than normal (such as 180/120 or higher), but you don't have symptoms.     · You think high blood pressure is causing symptoms, such as:  ? Severe headache.  ? Blurry vision.    Watch closely for changes in your health, and be sure to contact your doctor if:    · Your blood pressure measures higher than your doctor recommends at least 2 times. That means the top number is higher or the bottom number is higher, or both.     · You think you may be having side effects from your blood pressure medicine. Where can you learn more? Go to https://[email or web address] org and sign in to your [employer] account. Enter J828 in the Twist box to learn more about \"High Blood Pressure: Care Instructions. \"     If you do not have an account, please click on the \"Sign Up Now\" link. Current as of: April 9, 2019  Content Version: 12.3  © 1190-3275 [employer]. Care instructions adapted under license by [facility] ([facility]). If you have questions about a medical condition or this instruction, always ask your healthcare professional. [address] any warranty or liability for your use of this information. Patient Education        Ringworm: Care Instructions  Your Care Instructions  Ringworm is a fungus infection of the skin. It is not caused by a worm. Ringworm causes a round, scaly rash that may crack and itch. The rash can spread over a wide area. One type of fungus that causes ringworm is often found in locker rooms and swimming pools. It grows well in warm, moist areas of the skin, such as in skin folds. You can get ringworm by sharing towels, clothing, and sports equipment.  You can also get it by touching someone who has

## 2021-02-25 ENCOUNTER — OFFICE VISIT (OUTPATIENT)
Dept: FAMILY MEDICINE CLINIC | Age: 46
End: 2021-02-25
Payer: COMMERCIAL

## 2021-02-25 VITALS
WEIGHT: 272 LBS | OXYGEN SATURATION: 96 % | DIASTOLIC BLOOD PRESSURE: 82 MMHG | SYSTOLIC BLOOD PRESSURE: 136 MMHG | BODY MASS INDEX: 38.2 KG/M2 | HEART RATE: 99 BPM

## 2021-02-25 DIAGNOSIS — M25.561 CHRONIC PAIN OF RIGHT KNEE: ICD-10-CM

## 2021-02-25 DIAGNOSIS — G89.29 CHRONIC PAIN OF RIGHT KNEE: ICD-10-CM

## 2021-02-25 DIAGNOSIS — M54.41 CHRONIC BILATERAL LOW BACK PAIN WITH RIGHT-SIDED SCIATICA: ICD-10-CM

## 2021-02-25 DIAGNOSIS — G89.29 OTHER CHRONIC PAIN: ICD-10-CM

## 2021-02-25 DIAGNOSIS — J40 BRONCHITIS: ICD-10-CM

## 2021-02-25 DIAGNOSIS — J45.40 MODERATE PERSISTENT ASTHMA, UNSPECIFIED WHETHER COMPLICATED: Primary | ICD-10-CM

## 2021-02-25 DIAGNOSIS — R21 RASH: ICD-10-CM

## 2021-02-25 DIAGNOSIS — F17.200 SMOKER: ICD-10-CM

## 2021-02-25 DIAGNOSIS — R06.2 WHEEZING: ICD-10-CM

## 2021-02-25 DIAGNOSIS — G89.29 CHRONIC BILATERAL LOW BACK PAIN WITH RIGHT-SIDED SCIATICA: ICD-10-CM

## 2021-02-25 DIAGNOSIS — M54.2 NECK PAIN: ICD-10-CM

## 2021-02-25 DIAGNOSIS — B35.3 TINEA PEDIS OF LEFT FOOT: ICD-10-CM

## 2021-02-25 PROCEDURE — 4004F PT TOBACCO SCREEN RCVD TLK: CPT | Performed by: INTERNAL MEDICINE

## 2021-02-25 PROCEDURE — G8417 CALC BMI ABV UP PARAM F/U: HCPCS | Performed by: INTERNAL MEDICINE

## 2021-02-25 PROCEDURE — 99213 OFFICE O/P EST LOW 20 MIN: CPT

## 2021-02-25 PROCEDURE — 99214 OFFICE O/P EST MOD 30 MIN: CPT | Performed by: INTERNAL MEDICINE

## 2021-02-25 PROCEDURE — G8484 FLU IMMUNIZE NO ADMIN: HCPCS | Performed by: INTERNAL MEDICINE

## 2021-02-25 PROCEDURE — G8427 DOCREV CUR MEDS BY ELIG CLIN: HCPCS | Performed by: INTERNAL MEDICINE

## 2021-02-25 RX ORDER — BUDESONIDE AND FORMOTEROL FUMARATE DIHYDRATE 160; 4.5 UG/1; UG/1
2 AEROSOL RESPIRATORY (INHALATION) 2 TIMES DAILY
Qty: 1 INHALER | Refills: 3 | Status: SHIPPED | OUTPATIENT
Start: 2021-02-25 | End: 2021-06-07

## 2021-02-25 RX ORDER — NAPROXEN 500 MG/1
500 TABLET ORAL 2 TIMES DAILY PRN
Qty: 180 TABLET | Refills: 2 | Status: SHIPPED | OUTPATIENT
Start: 2021-02-25 | End: 2021-03-24 | Stop reason: SDUPTHER

## 2021-02-25 RX ORDER — TERBINAFINE HYDROCHLORIDE 250 MG/1
250 TABLET ORAL DAILY
Qty: 14 TABLET | Refills: 0 | Status: SHIPPED | OUTPATIENT
Start: 2021-02-25 | End: 2021-03-11

## 2021-02-25 RX ORDER — ALBUTEROL SULFATE 90 UG/1
2 AEROSOL, METERED RESPIRATORY (INHALATION) EVERY 4 HOURS PRN
Qty: 1 INHALER | Refills: 3 | Status: SHIPPED | OUTPATIENT
Start: 2021-02-25 | End: 2022-04-12 | Stop reason: SDUPTHER

## 2021-02-25 NOTE — PROGRESS NOTES
[address]  [address]  Dept: [phone]  Dept Fax: [phone]  Loc: [phone]     Visit Date:  2/25/2021    Patient:  Jacinda Lind  YOB: 1975    HPI:   Jacinda Lind presents today for   Chief Complaint   Patient presents with    Rash     pt has what he thinks is ringworm on arm and foot.  Asthma     patient is having asthma flare ups.  Arthritis     patient would like to get a script for naproxyn for arthritis in his spine. Sarah Hamilton HPI 41year old male with a history of morbid obesity BMI 38, arthritis/chronic low back pain, knee pain, asthma is coming in today for multiple medical complaints    Asthma/COPD/Smoking abuse- Requesting inhaler for his possible asthma/COPD. 1 pack smoking history for the past 30 years continues to smoke. He is only on albuterol and reports taking a longer acting bronchodilators in the past but not currently. Used to be on Symbicort and reports periodicCP/tightness/wheezing or worsening shortness of breath related episodes especially at night. Rash- Right upper extremity near bicep area circular rash is less on the left dorsal aspect of his toes top of the foot area looks like ringworm rash. He has been using topical agents with minimal relief of his symptoms is not itchy does not bed. Heroin Abuse/Narcotics Dependence/Opiod addiction-currently in rehab and will be in acute rehab for next couple months. He had a recent ER visit was found on the passenger seat snoring with respiratory rate 8 and was reported to be snorting heroin. Polyartharalgia/arthritis/low back pain neck pain.-He is asking for refills on naproxen today. He has seen chiropractors in the past.    Medications  Prior to Visit Medications    Medication Sig Taking?  Authorizing Provider losartan (COZAAR) 100 MG tablet Take 1 tablet by mouth daily Yes Erin Langley MD   hydroCHLOROthiazide (HYDRODIURIL) 25 MG tablet Take 1 tablet by mouth every morning Yes Erin Langley MD   clotrimazole (LOTRIMIN) 1 % cream Apply topically 2 times daily Apply topically 2 times daily. Yes Erin Langley MD   metoprolol tartrate (LOPRESSOR) 25 MG tablet Take 1 tablet by mouth 2 times daily Yes DAWIT Hayes CNP   hydrOXYzine (VISTARIL) 50 MG capsule take 1 capsule by mouth at bedtime Yes Historical Provider, MD   OXcarbazepine (TRILEPTAL) 300 MG tablet Take 1 tablet by mouth daily Yes Historical Provider, MD   diclofenac (VOLTAREN) 75 MG EC tablet Take 1 tablet by mouth 2 times daily for 14 days  Hali Renee MD   albuterol sulfate  (90 Base) MCG/ACT inhaler Inhale 2 puffs into the lungs every 4 hours as needed for Wheezing or Shortness of Breath (cough)  Patient not taking: Reported on 2/25/2021  DAWIT Hayes CNP   prazosin (MINIPRESS) 2 MG capsule Take 2 capsules by mouth daily  Historical Provider, MD        Allergies:  is allergic to other and pcn [penicillins]. Past Medical History:   has a past medical history of Bipolar 1 disorder ([facility] [address] History of heroin abuse ([facility] [address] Hypertension, and Personality disorder ([facility] [address] Past Surgical History   has a past surgical history that includes knee surgery (Left) and Finger surgery (Left). Family History  family history includes Cancer in his father; Diabetes in his mother; Heart Disease in his father; High Blood Pressure in his father and mother; Kidney Disease in his mother. Social History   reports that he has been smoking cigarettes. He uses smokeless tobacco. He reports previous alcohol use.     Health Maintenance:    Health Maintenance   Topic Date Due    Hepatitis C screen  1975    Pneumococcal 0-64 years Vaccine (1 of 1 - PPSV23) 06/22/1982    HIV screen  06/22/1991  DTaP/Tdap/Td vaccine (1 - Tdap) 06/22/1995    Potassium monitoring  02/12/2020    Creatinine monitoring  02/12/2020    Flu vaccine (1) 09/01/2020    Lipid screen  02/12/2024    Hepatitis A vaccine  Aged Out    Hepatitis B vaccine  Aged Out    Hib vaccine  Aged Out    Meningococcal (ACWY) vaccine  Aged Out       Subjective:      Review of Systems   Constitutional: Negative for fatigue, fever and unexpected weight change. HENT: Negative for ear pain, postnasal drip, rhinorrhea, sinus pain, sore throat and trouble swallowing. Eyes: Negative for visual disturbance. Respiratory: Negative for cough, chest tightness and shortness of breath. Cardiovascular: Negative for chest pain and leg swelling. Gastrointestinal: Negative for abdominal pain, blood in stool and diarrhea. Endocrine: Negative for polyuria. Genitourinary: Negative for difficulty urinating and flank pain. Musculoskeletal: Positive for arthralgias, back pain, neck pain and neck stiffness. Negative for joint swelling and myalgias. Skin: Positive for rash. Allergic/Immunologic: Negative for environmental allergies. Neurological: Negative for weakness, light-headedness, numbness and headaches. Hematological: Negative for adenopathy. Psychiatric/Behavioral: Negative for behavioral problems and suicidal ideas. The patient is not nervous/anxious. Objective:     /82 (Site: Right Upper Arm, Position: Sitting)   Pulse 99   Wt 272 lb (123.4 kg)   SpO2 96%   BMI 38.20 kg/m²     Physical Exam  Vitals signs and nursing note reviewed. Constitutional:       Appearance: He is obese. HENT:      Head: Normocephalic and atraumatic. Cardiovascular:      Rate and Rhythm: Normal rate and regular rhythm. Pulses: Normal pulses. Heart sounds: Normal heart sounds. Pulmonary:      Effort: Pulmonary effort is normal.      Breath sounds: Normal breath sounds. No stridor.    Abdominal: Palpations: Abdomen is soft. Skin:     General: Skin is warm. Findings: Rash present. Comments: Oval shaped macular scaly looking erythematous rash noticed on RUE and left foot area. - classic for central clearing with raised borders. Neurological:      Mental Status: He is oriented to person, place, and time. Mental status is at baseline. Psychiatric:         Mood and Affect: Mood normal.             Assessment       Diagnosis Orders   1. Moderate persistent asthma, unspecified whether complicated  naproxen (NAPROSYN) 500 MG tablet    albuterol sulfate  (90 Base) MCG/ACT inhaler    budesonide-formoterol (SYMBICORT) 160-4.5 MCG/ACT AERO    terbinafine (LAMISIL) 250 MG tablet    CBC Auto Differential    Comprehensive Metabolic Panel    TSH    T4, Free    Vitamin D 25 Hydroxy    Lipid Panel    Hemoglobin A1C    Urinalysis With Microscopic   2. Other chronic pain  naproxen (NAPROSYN) 500 MG tablet    albuterol sulfate  (90 Base) MCG/ACT inhaler    budesonide-formoterol (SYMBICORT) 160-4.5 MCG/ACT AERO    terbinafine (LAMISIL) 250 MG tablet    CBC Auto Differential    Comprehensive Metabolic Panel    TSH    T4, Free    Vitamin D 25 Hydroxy    Lipid Panel    Hemoglobin A1C    Urinalysis With Microscopic   3. Chronic bilateral low back pain with right-sided sciatica  naproxen (NAPROSYN) 500 MG tablet    albuterol sulfate  (90 Base) MCG/ACT inhaler    budesonide-formoterol (SYMBICORT) 160-4.5 MCG/ACT AERO    terbinafine (LAMISIL) 250 MG tablet    CBC Auto Differential    Comprehensive Metabolic Panel    TSH    T4, Free    Vitamin D 25 Hydroxy    Lipid Panel    Hemoglobin A1C    Urinalysis With Microscopic   4.  Rash  naproxen (NAPROSYN) 500 MG tablet    albuterol sulfate  (90 Base) MCG/ACT inhaler    budesonide-formoterol (SYMBICORT) 160-4.5 MCG/ACT AERO    terbinafine (LAMISIL) 250 MG tablet    CBC Auto Differential    Comprehensive Metabolic Panel    TSH    T4, Free Vitamin D 25 Hydroxy    Lipid Panel    Hemoglobin A1C    Urinalysis With Microscopic   5. Wheezing  naproxen (NAPROSYN) 500 MG tablet    albuterol sulfate  (90 Base) MCG/ACT inhaler    budesonide-formoterol (SYMBICORT) 160-4.5 MCG/ACT AERO    terbinafine (LAMISIL) 250 MG tablet    CBC Auto Differential    Comprehensive Metabolic Panel    TSH    T4, Free    Vitamin D 25 Hydroxy    Lipid Panel    Hemoglobin A1C    Urinalysis With Microscopic   6. Bronchitis  naproxen (NAPROSYN) 500 MG tablet    albuterol sulfate  (90 Base) MCG/ACT inhaler    budesonide-formoterol (SYMBICORT) 160-4.5 MCG/ACT AERO    terbinafine (LAMISIL) 250 MG tablet    CBC Auto Differential    Comprehensive Metabolic Panel    TSH    T4, Free    Vitamin D 25 Hydroxy    Lipid Panel    Hemoglobin A1C    Urinalysis With Microscopic   7. Smoker  naproxen (NAPROSYN) 500 MG tablet    albuterol sulfate  (90 Base) MCG/ACT inhaler    budesonide-formoterol (SYMBICORT) 160-4.5 MCG/ACT AERO    terbinafine (LAMISIL) 250 MG tablet    CBC Auto Differential    Comprehensive Metabolic Panel    TSH    T4, Free    Vitamin D 25 Hydroxy    Lipid Panel    Hemoglobin A1C    Urinalysis With Microscopic   8. Neck pain  naproxen (NAPROSYN) 500 MG tablet    albuterol sulfate  (90 Base) MCG/ACT inhaler    budesonide-formoterol (SYMBICORT) 160-4.5 MCG/ACT AERO    terbinafine (LAMISIL) 250 MG tablet    CBC Auto Differential    Comprehensive Metabolic Panel    TSH    T4, Free    Vitamin D 25 Hydroxy    Lipid Panel    Hemoglobin A1C    Urinalysis With Microscopic   9.  Chronic pain of right knee  naproxen (NAPROSYN) 500 MG tablet    albuterol sulfate  (90 Base) MCG/ACT inhaler    budesonide-formoterol (SYMBICORT) 160-4.5 MCG/ACT AERO    terbinafine (LAMISIL) 250 MG tablet    CBC Auto Differential    Comprehensive Metabolic Panel    TSH    T4, Free    Vitamin D 25 Hydroxy    Lipid Panel    Hemoglobin A1C    Urinalysis With Microscopic 10. Tinea pedis of left foot  naproxen (NAPROSYN) 500 MG tablet    albuterol sulfate  (90 Base) MCG/ACT inhaler    budesonide-formoterol (SYMBICORT) 160-4.5 MCG/ACT AERO    terbinafine (LAMISIL) 250 MG tablet    CBC Auto Differential    Comprehensive Metabolic Panel    TSH    T4, Free    Vitamin D 25 Hydroxy    Lipid Panel    Hemoglobin A1C    Urinalysis With Microscopic         PLAN   Albuterol and Symbicort Rx placed,. Counseled on minimizing smoking  Labs/fasting labs ordered  Trial of Terbinafine x 2 weeks for the tinea infection  Naproxen for arthritis pain/chronic low back pain. Follow up in 2 weeks  Need info in regards to suboxone clinic. Orders Placed This Encounter   Procedures    CBC Auto Differential     Standing Status:   Future     Standing Expiration Date:   2/25/2022    Comprehensive Metabolic Panel     Standing Status:   Future     Standing Expiration Date:   2/25/2022    TSH     Standing Status:   Future     Standing Expiration Date:   2/25/2022    T4, Free     Standing Status:   Future     Standing Expiration Date:   2/25/2022    Vitamin D 25 Hydroxy     Standing Status:   Future     Standing Expiration Date:   2/25/2022    Lipid Panel     Standing Status:   Future     Standing Expiration Date:   2/25/2022     Order Specific Question:   Is Patient Fasting?/# of Hours     Answer:   12 hours    Hemoglobin A1C     Standing Status:   Future     Standing Expiration Date:   2/25/2022    Urinalysis With Microscopic     Standing Status:   Future     Standing Expiration Date:   2/25/2022     Order Specific Question:   SPECIFY(EX-CATH,MIDSTREAM,CYSTO,ETC)? Answer:   mid stream        No follow-ups on file. Patient given educational materials - see patient instructions. Discussed use, benefit, and side effects of prescribed medications. All patient questions answered. Pt voiced understanding. Reviewed health maintenance. Electronically signed Caridad Perez MD on 2/25/2021 at 2:12 PM EST

## 2021-03-01 ASSESSMENT — ENCOUNTER SYMPTOMS
SHORTNESS OF BREATH: 0
BACK PAIN: 1
BLOOD IN STOOL: 0
DIARRHEA: 0
RHINORRHEA: 0
ABDOMINAL PAIN: 0
SORE THROAT: 0
CHEST TIGHTNESS: 0
COUGH: 0
TROUBLE SWALLOWING: 0
SINUS PAIN: 0

## 2021-03-23 DIAGNOSIS — M25.512 CHRONIC LEFT SHOULDER PAIN: ICD-10-CM

## 2021-03-23 DIAGNOSIS — G89.29 CHRONIC LEFT SHOULDER PAIN: ICD-10-CM

## 2021-03-23 DIAGNOSIS — G89.29 CHRONIC BILATERAL LOW BACK PAIN WITH RIGHT-SIDED SCIATICA: ICD-10-CM

## 2021-03-23 DIAGNOSIS — G89.29 OTHER CHRONIC PAIN: ICD-10-CM

## 2021-03-23 DIAGNOSIS — M54.2 NECK PAIN: ICD-10-CM

## 2021-03-23 DIAGNOSIS — M12.812 ROTATOR CUFF ARTHROPATHY OF LEFT SHOULDER: ICD-10-CM

## 2021-03-23 DIAGNOSIS — B35.3 TINEA PEDIS OF LEFT FOOT: ICD-10-CM

## 2021-03-23 DIAGNOSIS — F17.200 SMOKER: ICD-10-CM

## 2021-03-23 DIAGNOSIS — R21 RASH: ICD-10-CM

## 2021-03-23 DIAGNOSIS — J40 BRONCHITIS: ICD-10-CM

## 2021-03-23 DIAGNOSIS — M54.41 CHRONIC BILATERAL LOW BACK PAIN WITH RIGHT-SIDED SCIATICA: ICD-10-CM

## 2021-03-23 DIAGNOSIS — B35.9 RINGWORM: ICD-10-CM

## 2021-03-23 DIAGNOSIS — R06.2 WHEEZING: ICD-10-CM

## 2021-03-23 DIAGNOSIS — I10 ESSENTIAL HYPERTENSION: ICD-10-CM

## 2021-03-23 DIAGNOSIS — J45.40 MODERATE PERSISTENT ASTHMA, UNSPECIFIED WHETHER COMPLICATED: ICD-10-CM

## 2021-03-23 DIAGNOSIS — M25.561 CHRONIC PAIN OF RIGHT KNEE: ICD-10-CM

## 2021-03-23 DIAGNOSIS — G89.29 CHRONIC PAIN OF RIGHT KNEE: ICD-10-CM

## 2021-03-24 ENCOUNTER — OFFICE VISIT (OUTPATIENT)
Dept: FAMILY MEDICINE CLINIC | Age: 46
End: 2021-03-24
Payer: COMMERCIAL

## 2021-03-24 VITALS
HEART RATE: 98 BPM | WEIGHT: 274 LBS | BODY MASS INDEX: 38.49 KG/M2 | SYSTOLIC BLOOD PRESSURE: 148 MMHG | OXYGEN SATURATION: 96 % | DIASTOLIC BLOOD PRESSURE: 82 MMHG

## 2021-03-24 DIAGNOSIS — Z79.4 TYPE 2 DIABETES MELLITUS WITH MICROALBUMINURIA, WITH LONG-TERM CURRENT USE OF INSULIN (HCC): ICD-10-CM

## 2021-03-24 DIAGNOSIS — E78.1 HYPERTRIGLYCERIDEMIA: ICD-10-CM

## 2021-03-24 DIAGNOSIS — J40 BRONCHITIS: ICD-10-CM

## 2021-03-24 DIAGNOSIS — B35.9 RINGWORM: ICD-10-CM

## 2021-03-24 DIAGNOSIS — R80.9 TYPE 2 DIABETES MELLITUS WITH MICROALBUMINURIA, WITH LONG-TERM CURRENT USE OF INSULIN (HCC): ICD-10-CM

## 2021-03-24 DIAGNOSIS — M54.41 CHRONIC BILATERAL LOW BACK PAIN WITH RIGHT-SIDED SCIATICA: ICD-10-CM

## 2021-03-24 DIAGNOSIS — M54.2 NECK PAIN: ICD-10-CM

## 2021-03-24 DIAGNOSIS — L60.0 INGROWING NAIL, RIGHT GREAT TOE: Primary | ICD-10-CM

## 2021-03-24 DIAGNOSIS — F17.200 SMOKER: ICD-10-CM

## 2021-03-24 DIAGNOSIS — I10 ESSENTIAL HYPERTENSION: ICD-10-CM

## 2021-03-24 DIAGNOSIS — B35.3 TINEA PEDIS OF LEFT FOOT: ICD-10-CM

## 2021-03-24 DIAGNOSIS — J45.40 MODERATE PERSISTENT ASTHMA, UNSPECIFIED WHETHER COMPLICATED: ICD-10-CM

## 2021-03-24 DIAGNOSIS — F11.21 OPIOID DEPENDENCE IN REMISSION (HCC): ICD-10-CM

## 2021-03-24 DIAGNOSIS — F14.10 COCAINE ABUSE (HCC): ICD-10-CM

## 2021-03-24 DIAGNOSIS — R21 RASH: ICD-10-CM

## 2021-03-24 DIAGNOSIS — G89.29 CHRONIC PAIN OF RIGHT KNEE: ICD-10-CM

## 2021-03-24 DIAGNOSIS — E87.6 HYPOKALEMIA: ICD-10-CM

## 2021-03-24 DIAGNOSIS — R63.1 POLYDIPSIA: ICD-10-CM

## 2021-03-24 DIAGNOSIS — G89.29 CHRONIC BILATERAL LOW BACK PAIN WITH RIGHT-SIDED SCIATICA: ICD-10-CM

## 2021-03-24 DIAGNOSIS — M25.561 CHRONIC PAIN OF RIGHT KNEE: ICD-10-CM

## 2021-03-24 DIAGNOSIS — E55.9 VITAMIN D DEFICIENCY: ICD-10-CM

## 2021-03-24 DIAGNOSIS — E11.29 TYPE 2 DIABETES MELLITUS WITH MICROALBUMINURIA, WITH LONG-TERM CURRENT USE OF INSULIN (HCC): ICD-10-CM

## 2021-03-24 DIAGNOSIS — R35.89 POLYURIA: ICD-10-CM

## 2021-03-24 DIAGNOSIS — R06.2 WHEEZING: ICD-10-CM

## 2021-03-24 DIAGNOSIS — G89.29 OTHER CHRONIC PAIN: ICD-10-CM

## 2021-03-24 PROCEDURE — 99213 OFFICE O/P EST LOW 20 MIN: CPT

## 2021-03-24 PROCEDURE — 3046F HEMOGLOBIN A1C LEVEL >9.0%: CPT | Performed by: INTERNAL MEDICINE

## 2021-03-24 PROCEDURE — 99214 OFFICE O/P EST MOD 30 MIN: CPT | Performed by: INTERNAL MEDICINE

## 2021-03-24 PROCEDURE — G8427 DOCREV CUR MEDS BY ELIG CLIN: HCPCS | Performed by: INTERNAL MEDICINE

## 2021-03-24 PROCEDURE — 2022F DILAT RTA XM EVC RTNOPTHY: CPT | Performed by: INTERNAL MEDICINE

## 2021-03-24 PROCEDURE — G8417 CALC BMI ABV UP PARAM F/U: HCPCS | Performed by: INTERNAL MEDICINE

## 2021-03-24 PROCEDURE — G8484 FLU IMMUNIZE NO ADMIN: HCPCS | Performed by: INTERNAL MEDICINE

## 2021-03-24 PROCEDURE — 4004F PT TOBACCO SCREEN RCVD TLK: CPT | Performed by: INTERNAL MEDICINE

## 2021-03-24 RX ORDER — HYDROCHLOROTHIAZIDE 25 MG/1
25 TABLET ORAL EVERY MORNING
Qty: 30 TABLET | Refills: 3 | Status: SHIPPED | OUTPATIENT
Start: 2021-03-24 | End: 2021-07-12

## 2021-03-24 RX ORDER — BUPRENORPHINE 10 UG/H
1 PATCH TRANSDERMAL WEEKLY
Qty: 4 PATCH | Refills: 0 | Status: SHIPPED | OUTPATIENT
Start: 2021-03-24 | End: 2021-04-14

## 2021-03-24 RX ORDER — POTASSIUM CHLORIDE 20 MEQ/1
20 TABLET, EXTENDED RELEASE ORAL DAILY
Qty: 30 TABLET | Refills: 1 | Status: SHIPPED | OUTPATIENT
Start: 2021-03-24 | End: 2021-05-10

## 2021-03-24 RX ORDER — TERBINAFINE HYDROCHLORIDE 250 MG/1
250 TABLET ORAL DAILY
Qty: 14 TABLET | Refills: 0 | Status: SHIPPED | OUTPATIENT
Start: 2021-03-24 | End: 2021-04-07

## 2021-03-24 RX ORDER — NAPROXEN 500 MG/1
500 TABLET ORAL 2 TIMES DAILY PRN
Qty: 180 TABLET | Refills: 2 | Status: SHIPPED | OUTPATIENT
Start: 2021-03-24 | End: 2021-05-12 | Stop reason: SDUPTHER

## 2021-03-24 RX ORDER — MULTIVIT-MIN/IRON/FOLIC ACID/K 18-600-40
2000 CAPSULE ORAL DAILY
Qty: 30 CAPSULE | Refills: 3 | Status: SHIPPED | OUTPATIENT
Start: 2021-03-24 | End: 2021-07-12

## 2021-03-24 RX ORDER — GLUCOSAMINE HCL/CHONDROITIN SU 500-400 MG
CAPSULE ORAL
Qty: 300 STRIP | Refills: 3 | Status: SHIPPED | OUTPATIENT
Start: 2021-03-24

## 2021-03-24 RX ORDER — LANCETS 30 GAUGE
1 EACH MISCELLANEOUS 3 TIMES DAILY
Qty: 300 EACH | Refills: 3 | Status: SHIPPED | OUTPATIENT
Start: 2021-03-24

## 2021-03-24 RX ORDER — LOSARTAN POTASSIUM 100 MG/1
100 TABLET ORAL DAILY
Qty: 30 TABLET | Refills: 3 | Status: SHIPPED | OUTPATIENT
Start: 2021-03-24 | End: 2021-07-12

## 2021-03-24 RX ORDER — INSULIN GLARGINE 100 [IU]/ML
35 INJECTION, SOLUTION SUBCUTANEOUS NIGHTLY
Qty: 5 PEN | Refills: 3 | Status: SHIPPED | OUTPATIENT
Start: 2021-03-24 | End: 2021-04-14 | Stop reason: SDUPTHER

## 2021-03-24 NOTE — PROGRESS NOTES
LDL of 111. The 10-year ASCVD risk score (Lisa Starkey., et al., 2013) is: 13.1%    Values used to calculate the score:      Age: 41 years      Sex: Male      Is Non- : No      Diabetic: Yes      Tobacco smoker: Yes      Systolic Blood Pressure: 734 mmHg      Is BP treated: Yes      HDL Cholesterol: 44 mg/dL      Total Cholesterol: 174 mg/dL      Vitamin D deficiency- Vit D levels of 18.3. Start him with Vit D 2000 IU capsules daily. Asthma/COPD/Smoking abuse- On inhalers CASANDRA and long acting bronchodilators symbicort. 1 pack smoking history for the past 30 years continues to smoke. He is only on albuterol and reports periodicCP/tightness/wheezing or worsening shortness of breath related episodes especially at night. Polyartharalgia/arthritis/low back pain neck pain.-He is asking for refills on naproxen today. He has seen chiropractors in the past.      Heroin Abuse/Narcotics Dependence/Opiod addiction/Opiod use disorder-was in rehab, now in sober house He had a ER visit was found on the passenger seat snoring with respiratory rate 8 and was reported to be snorting heroin?cocaine. Would like to get started with buprenorphine patches today to see if that would help with his opiod addiction.      Discussed in detail about some of the side effects of buprenorphine/should be very cautious and require closer monitoring some the side effects include CNS depression,sedatiom which may impair physical mental abilities transient elevations and hepatic events can cause hepatotoxicity if there is a history of alcohol IV drug abuser history of hepatitis hypersensitivity-like reactions including bronchospasm and edema and anaphylactic shock reactions hypotension and can worsen the hypotensive related episodes infection on the side of patch QTC prolongation causing arrhythmias, can further exacerbate with history of long QT syndrome worsen with hypokalemia hypomagnesemia unstable heart disease heart failure arrhythmia problems respiratory depression and should not be combined with other sedatives such as alcohol or benzodiazepines and he should not be using any form of opioids. Concomitant use of benzodiazepines or other CNS depressants, including alcohol and opioids, may result in profound sedation, respiratory depression, coma, and death. Abrupt discontinuation/withdrawal: Abrupt discontinuation in patients who are physically dependent to opioids has been associated with serious withdrawal symptoms, uncontrolled pain, attempts to find other opioids (including illicit), and suicide      Microscopic Hematuria-UA shows RBCs. Needs to be further evaluated. No UTI or kidney stones or BPH like symptoms.        Medications  Prior to Visit Medications    Medication Sig Taking? Authorizing Provider   naproxen (NAPROSYN) 500 MG tablet Take 1 tablet by mouth 2 times daily as needed for Pain Yes Ana Cline MD   albuterol sulfate  (90 Base) MCG/ACT inhaler Inhale 2 puffs into the lungs every 4 hours as needed for Wheezing or Shortness of Breath (cough) Yes Ana Cline MD   budesonide-formoterol (SYMBICORT) 160-4.5 MCG/ACT AERO Inhale 2 puffs into the lungs 2 times daily Yes Ana Cline MD   losartan (COZAAR) 100 MG tablet Take 1 tablet by mouth daily Yes Ana Cline MD   hydroCHLOROthiazide (HYDRODIURIL) 25 MG tablet Take 1 tablet by mouth every morning Yes Ana Clien MD   hydrOXYzine (VISTARIL) 50 MG capsule take 1 capsule by mouth at bedtime Yes Historical Provider, MD   OXcarbazepine (TRILEPTAL) 300 MG tablet Take 1 tablet by mouth daily Yes Historical Provider, MD   clotrimazole (LOTRIMIN) 1 % cream Apply topically 2 times daily Apply topically 2 times daily.   Patient not taking: Reported on 3/24/2021  Ana Cline MD   diclofenac (VOLTAREN) 75 MG EC tablet Take 1 tablet by mouth 2 times daily for 14 days  Omer Gonzales MD   metoprolol tartrate (LOPRESSOR) 25 MG tablet Take 1 tablet by mouth 2 times daily  Hannah Husbands, APRN - CNP   prazosin (MINIPRESS) 2 MG capsule Take 2 capsules by mouth daily  Historical Provider, MD        Allergies:  is allergic to other and pcn [penicillins]. Past Medical History:   has a past medical history of Bipolar 1 disorder ([facility] History of heroin abuse ([facility] Hypertension, and Personality disorder ([facility] Past Surgical History   has a past surgical history that includes knee surgery (Left) and Finger surgery (Left). Family History  family history includes Cancer in his father; Diabetes in his mother; Heart Disease in his father; High Blood Pressure in his father and mother; Kidney Disease in his mother. Social History   reports that he has been smoking cigarettes. He uses smokeless tobacco. He reports previous alcohol use. Health Maintenance:    Health Maintenance   Topic Date Due    Hepatitis C screen  Never done    Pneumococcal 0-64 years Vaccine (1 of 1 - PPSV23) Never done    Diabetic foot exam  Never done    Diabetic retinal exam  Never done    HIV screen  Never done    COVID-19 Vaccine (1) Never done    Hepatitis B vaccine (1 of 3 - Risk 3-dose series) Never done    DTaP/Tdap/Td vaccine (1 - Tdap) Never done    Flu vaccine (1) Never done    A1C test (Diabetic or Prediabetic)  03/22/2022    Lipid screen  03/22/2022    Potassium monitoring  03/22/2022    Creatinine monitoring  03/22/2022    Hepatitis A vaccine  Aged Out    Hib vaccine  Aged Out    Meningococcal (ACWY) vaccine  Aged Out       Subjective:      Review of Systems   Constitutional: Negative for fatigue, fever and unexpected weight change. HENT: Negative for ear pain, postnasal drip, rhinorrhea, sinus pain, sore throat and trouble swallowing. Eyes: Negative for visual disturbance. Respiratory: Negative for cough, chest tightness and shortness of breath. Cardiovascular: Negative for chest pain and leg swelling.    Gastrointestinal: Negative for SOLOSTAR) 100 UNIT/ML injection pen    Catrachito Farr NP, Diabetic Education, [place]    Amb Referral to [address] [place] [address] Ophthalmology    naproxen (NAPROSYN) 500 MG tablet    [place], EDNAERDOREEN, [place], Podiatry, [place]    terbinafine (LAMISIL) 250 MG tablet    Vitamin D, Cholecalciferol, 50 MCG (2000 UT) CAPS    buprenorphine (BUPRENEX) 10 MCG/HR PTWK    potassium chloride (KLOR-CON M) 20 MEQ extended release tablet    blood glucose monitor kit and supplies    Lancets MISC    blood glucose monitor strips   2. Type 2 diabetes mellitus with microalbuminuria, with long-term current use of insulin (HCC)  hydroCHLOROthiazide (HYDRODIURIL) 25 MG tablet    losartan (COZAAR) 100 MG tablet    metFORMIN (GLUCOPHAGE) 1000 MG tablet    SITagliptin (JANUVIA) 100 MG tablet    insulin glargine (LANTUS SOLOSTAR) 100 UNIT/ML injection pen    Catrachito Farr NP, Diabetic Education, [place]    Amb Referral to [address] [place] [address] Ophthalmology    naproxen (NAPROSYN) 500 MG tablet    GLENNY Moya DP, Podiatry, [place]    terbinafine (LAMISIL) 250 MG tablet    Vitamin D, Cholecalciferol, 50 MCG (2000 UT) CAPS    buprenorphine (BUPRENEX) 10 MCG/HR PTWK    potassium chloride (KLOR-CON M) 20 MEQ extended release tablet    blood glucose monitor kit and supplies    Lancets MISC    blood glucose monitor strips   3.  Polyuria  hydroCHLOROthiazide (HYDRODIURIL) 25 MG tablet    losartan (COZAAR) 100 MG tablet    metFORMIN (GLUCOPHAGE) 1000 MG tablet    SITagliptin (JANUVIA) 100 MG tablet    insulin glargine (LANTUS SOLOSTAR) 100 UNIT/ML injection pen    Catrachito Farr NP, Diabetic Education, [place]    Amb Referral to [address] [place] [address] Ophthalmology    naproxen (NAPROSYN) 500 MG tablet    Jessica Fritz, HENNYM, Podiatry, [place]    terbinafine (LAMISIL) 250 MG tablet    Vitamin D, Cholecalciferol, 50 MCG (2000 UT) CAPS    buprenorphine (BUPRENEX) 10 MCG/HR PTWK    potassium chloride (KLOR-CON M) 20 MEQ extended release tablet    blood glucose monitor kit and supplies    Lancets MISC    blood glucose monitor strips   4. Polydipsia  hydroCHLOROthiazide (HYDRODIURIL) 25 MG tablet    losartan (COZAAR) 100 MG tablet    metFORMIN (GLUCOPHAGE) 1000 MG tablet    SITagliptin (JANUVIA) 100 MG tablet    insulin glargine (LANTUS SOLOSTAR) 100 UNIT/ML injection pen    Catrachito Farr, NP, Diabetic Education, [place]    Amb Referral to [address] [place] [address] Ophthalmology    naproxen (NAPROSYN) 500 MG tablet    [facility] MarlynGLENNY, HENNYM, Podiatry, [place]    terbinafine (LAMISIL) 250 MG tablet    Vitamin D, Cholecalciferol, 50 MCG (2000 UT) CAPS    buprenorphine (BUPRENEX) 10 MCG/HR PTWK    potassium chloride (KLOR-CON M) 20 MEQ extended release tablet    blood glucose monitor kit and supplies    Lancets MISC    blood glucose monitor strips   5. Vitamin D deficiency  hydroCHLOROthiazide (HYDRODIURIL) 25 MG tablet    losartan (COZAAR) 100 MG tablet    metFORMIN (GLUCOPHAGE) 1000 MG tablet    SITagliptin (JANUVIA) 100 MG tablet    insulin glargine (LANTUS SOLOSTAR) 100 UNIT/ML injection pen    Catrachito Farr NP, Diabetic Education, [place]    Amb Referral to [address] [place] [address] Ophthalmology    naproxen (NAPROSYN) 500 MG tablet    [facility] Theora MarlynGLENNY DPM, Podiatry, [place]    terbinafine (LAMISIL) 250 MG tablet    Vitamin D, Cholecalciferol, 50 MCG (2000 UT) CAPS    buprenorphine (BUPRENEX) 10 MCG/HR PTWK    potassium chloride (KLOR-CON M) 20 MEQ extended release tablet    blood glucose monitor kit and supplies    Lancets MISC    blood glucose monitor strips   6.  Essential hypertension  hydroCHLOROthiazide (HYDRODIURIL) 25 MG tablet    losartan (COZAAR) 100 MG tablet    metFORMIN (GLUCOPHAGE) 1000 MG tablet    SITagliptin (JANUVIA) 100 MG tablet    insulin glargine (LANTUS SOLOSTAR) 100 UNIT/ML injection pen    Catrachito Farr NP, Diabetic Education, [place]    Amb Referral to [address] [place] [address] Ophthalmology    naproxen (NAPROSYN) 500 MG tablet    [place], Nigel Thomas, DPM, Podiatry, [place]    terbinafine (LAMISIL) 250 MG tablet    Vitamin D, Cholecalciferol, 50 MCG (2000 UT) CAPS    buprenorphine (BUPRENEX) 10 MCG/HR PTWK    potassium chloride (KLOR-CON M) 20 MEQ extended release tablet    blood glucose monitor kit and supplies    Lancets MISC    blood glucose monitor strips   7. Hypokalemia  hydroCHLOROthiazide (HYDRODIURIL) 25 MG tablet    losartan (COZAAR) 100 MG tablet    metFORMIN (GLUCOPHAGE) 1000 MG tablet    SITagliptin (JANUVIA) 100 MG tablet    insulin glargine (LANTUS SOLOSTAR) 100 UNIT/ML injection pen    Catrachito Farr NP, Diabetic Education, [place]    Amb Referral to [address] [place] [address] Ophthalmology    naproxen (NAPROSYN) 500 MG tablet    [facility] Leon[facility]io JonnyNigel davison, DPM, Podiatry, [place]    terbinafine (LAMISIL) 250 MG tablet    Vitamin D, Cholecalciferol, 50 MCG (2000 UT) CAPS    buprenorphine (BUPRENEX) 10 MCG/HR PTWK    potassium chloride (KLOR-CON M) 20 MEQ extended release tablet    blood glucose monitor kit and supplies    Lancets MISC    blood glucose monitor strips   8.  Hypertriglyceridemia  hydroCHLOROthiazide (HYDRODIURIL) 25 MG tablet    losartan (COZAAR) 100 MG tablet    metFORMIN (GLUCOPHAGE) 1000 MG tablet    SITagliptin (JANUVIA) 100 MG tablet    insulin glargine (LANTUS SOLOSTAR) 100 UNIT/ML injection pen    Catrachito Lemons NP, Diabetic Education, [place]    Amb Referral to [address] [place] [address] Ophthalmology    naproxen (NAPROSYN) 500 MG tablet    [facility] Leoncio JonnyNigel davison, DPM, Podiatry, [place]    terbinafine (LAMISIL) 250 MG tablet    Vitamin D, Cholecalciferol, 50 MCG (2000 UT) CAPS    buprenorphine (BUPRENEX) 10 MCG/HR PTWK    potassium chloride (KLOR-CON M) 20 MEQ extended release tablet    blood glucose monitor kit and supplies    Lancets MISC    blood glucose monitor strips   9. Ringworm  hydroCHLOROthiazide (HYDRODIURIL) 25 MG tablet    losartan (COZAAR) 100 MG tablet    metFORMIN (GLUCOPHAGE) 1000 MG tablet    SITagliptin (JANUVIA) 100 MG tablet    insulin glargine (LANTUS SOLOSTAR) 100 UNIT/ML injection pen    Catrachito Farr NP, Diabetic Education, [place]    Amb Referral to [address] [place] [address] Ophthalmology    naproxen (NAPROSYN) 500 MG tablet    [facility] Bolster HIPPERDOREEN, DPM, Podiatry, [place]    terbinafine (LAMISIL) 250 MG tablet    Vitamin D, Cholecalciferol, 50 MCG (2000 UT) CAPS    buprenorphine (BUPRENEX) 10 MCG/HR PTWK    potassium chloride (KLOR-CON M) 20 MEQ extended release tablet    blood glucose monitor kit and supplies    Lancets MISC    blood glucose monitor strips   10. Moderate persistent asthma, unspecified whether complicated  hydroCHLOROthiazide (HYDRODIURIL) 25 MG tablet    losartan (COZAAR) 100 MG tablet    metFORMIN (GLUCOPHAGE) 1000 MG tablet    SITagliptin (JANUVIA) 100 MG tablet    insulin glargine (LANTUS SOLOSTAR) 100 UNIT/ML injection pen    Catrachito Farr NP, Diabetic Education, [place]    Amb Referral to [address] [place] [address] Ophthalmology    naproxen (NAPROSYN) 500 MG tablet    [facility] Santiago BolsterEDNAERDOREEN, DPM, Podiatry, [place]    terbinafine (LAMISIL) 250 MG tablet    Vitamin D, Cholecalciferol, 50 MCG (2000 UT) CAPS    buprenorphine (BUPRENEX) 10 MCG/HR PTWK    potassium chloride (KLOR-CON M) 20 MEQ extended release tablet    blood glucose monitor kit and supplies    Lancets MISC    blood glucose monitor strips   11.  Other chronic pain  hydroCHLOROthiazide (HYDRODIURIL) 25 MG tablet    losartan (COZAAR) 100 MG tablet    metFORMIN (GLUCOPHAGE) 1000 MG tablet    SITagliptin (JANUVIA) 100 MG tablet    insulin glargine (LANTUS SOLOSTAR) 100 UNIT/ML injection pen    [address] [place] [address] Catrachito, SUZANNE, Diabetic Education, [place]    Amb Referral to [address] [place] [address] Ophthalmology    naproxen (NAPROSYN) 500 MG tablet    Gladisy GLENNY Horton, [place], Podiatry, [place]    terbinafine (LAMISIL) 250 MG tablet    Vitamin D, Cholecalciferol, 50 MCG (2000 UT) CAPS    buprenorphine (BUPRENEX) 10 MCG/HR PTWK    potassium chloride (KLOR-CON M) 20 MEQ extended release tablet    blood glucose monitor kit and supplies    Lancets MISC    blood glucose monitor strips   12. Chronic bilateral low back pain with right-sided sciatica  hydroCHLOROthiazide (HYDRODIURIL) 25 MG tablet    losartan (COZAAR) 100 MG tablet    metFORMIN (GLUCOPHAGE) 1000 MG tablet    SITagliptin (JANUVIA) 100 MG tablet    insulin glargine (LANTUS SOLOSTAR) 100 UNIT/ML injection pen    Catrachito Farr, NP, Diabetic Education, [place]    Amb Referral to [address] [place] [address] Ophthalmology    naproxen (NAPROSYN) 500 MG tablet    Gladisy - GLENNY Jung, [facility], Podiatry, [place]    terbinafine (LAMISIL) 250 MG tablet    Vitamin D, Cholecalciferol, 50 MCG (2000 UT) CAPS    buprenorphine (BUPRENEX) 10 MCG/HR PTWK    potassium chloride (KLOR-CON M) 20 MEQ extended release tablet    blood glucose monitor kit and supplies    Lancets MISC    blood glucose monitor strips   13.  Rash  hydroCHLOROthiazide (HYDRODIURIL) 25 MG tablet    losartan (COZAAR) 100 MG tablet    metFORMIN (GLUCOPHAGE) 1000 MG tablet    SITagliptin (JANUVIA) 100 MG tablet    insulin glargine (LANTUS SOLOSTAR) 100 UNIT/ML injection pen    Catrachito Sheldon, NP, Diabetic Education, [place]    Amb Referral to [address] [place] [address] Ophthalmology    naproxen (NAPROSYN) 500 MG tablet    Gladisy - GLENNY Jung, HENNY, Podiatry, [place]    terbinafine (LAMISIL) 250 MG tablet    Vitamin D, Cholecalciferol, 50 MCG (2000 UT) CAPS    buprenorphine (BUPRENEX) 10 MCG/HR [facility] potassium chloride (KLOR-CON M) 20 MEQ extended release tablet    blood glucose monitor kit and supplies    Lancets MISC    blood glucose monitor strips   14. Wheezing  hydroCHLOROthiazide (HYDRODIURIL) 25 MG tablet    losartan (COZAAR) 100 MG tablet    metFORMIN (GLUCOPHAGE) 1000 MG tablet    SITagliptin (JANUVIA) 100 MG tablet    insulin glargine (LANTUS SOLOSTAR) 100 UNIT/ML injection pen    [facility], [facility], NP, Diabetic Education, [place]    Amb Referral to [address] [place] [address] Ophthalmology    naproxen (NAPROSYN) 500 MG tablet    [facility] So[facility] Wisam, HIPPERHOLME, DPM, Podiatry, [place]    terbinafine (LAMISIL) 250 MG tablet    Vitamin D, Cholecalciferol, 50 MCG (2000 UT) CAPS    buprenorphine (BUPRENEX) 10 MCG/HR PTWK    potassium chloride (KLOR-CON M) 20 MEQ extended release tablet    blood glucose monitor kit and supplies    Lancets MISC    blood glucose monitor strips   15. Bronchitis  hydroCHLOROthiazide (HYDRODIURIL) 25 MG tablet    losartan (COZAAR) 100 MG tablet    metFORMIN (GLUCOPHAGE) 1000 MG tablet    SITagliptin (JANUVIA) 100 MG tablet    insulin glargine (LANTUS SOLOSTAR) 100 UNIT/ML injection pen    [facility], [employer], NP, Diabetic Education, [place]    Amb Referral to [address] [place] [address] Ophthalmology    naproxen (NAPROSYN) 500 MG tablet    [facility] So[facility] Wisam, HIPPERHOLME, DPM, Podiatry, [place]    terbinafine (LAMISIL) 250 MG tablet    Vitamin D, Cholecalciferol, 50 MCG (2000 UT) CAPS    buprenorphine (BUPRENEX) 10 MCG/HR PTWK    potassium chloride (KLOR-CON M) 20 MEQ extended release tablet    blood glucose monitor kit and supplies    Lancets MISC    blood glucose monitor strips   16.  Smoker  hydroCHLOROthiazide (HYDRODIURIL) 25 MG tablet    losartan (COZAAR) 100 MG tablet    metFORMIN (GLUCOPHAGE) 1000 MG tablet    SITagliptin (JANUVIA) 100 MG tablet    insulin glargine (LANTUS SOLOSTAR) 100 UNIT/ML injection pen    [facility] [place], [facility], NP, Diabetic Education, [place]    Amb Referral to [address] [place] [address] Ophthalmology    naproxen (NAPROSYN) 500 MG tablet    GLENNY [place], Podiatry, [place]    terbinafine (LAMISIL) 250 MG tablet    Vitamin D, Cholecalciferol, 50 MCG (2000 UT) CAPS    buprenorphine (BUPRENEX) 10 MCG/HR PTWK    potassium chloride (KLOR-CON M) 20 MEQ extended release tablet    blood glucose monitor kit and supplies    Lancets MISC    blood glucose monitor strips   17. Neck pain  hydroCHLOROthiazide (HYDRODIURIL) 25 MG tablet    losartan (COZAAR) 100 MG tablet    metFORMIN (GLUCOPHAGE) 1000 MG tablet    SITagliptin (JANUVIA) 100 MG tablet    insulin glargine (LANTUS SOLOSTAR) 100 UNIT/ML injection pen    Catrachito Farr NP, Diabetic Education, [place]    Amb Referral to [address] [place] [address] Ophthalmology    naproxen (NAPROSYN) 500 MG tablet    Gladisy - GLENNY Rosen DPM, Podiatry, [place]    terbinafine (LAMISIL) 250 MG tablet    Vitamin D, Cholecalciferol, 50 MCG (2000 UT) CAPS    buprenorphine (BUPRENEX) 10 MCG/HR PTWK    potassium chloride (KLOR-CON M) 20 MEQ extended release tablet    blood glucose monitor kit and supplies    Lancets MISC    blood glucose monitor strips   18.  Chronic pain of right knee  hydroCHLOROthiazide (HYDRODIURIL) 25 MG tablet    losartan (COZAAR) 100 MG tablet    metFORMIN (GLUCOPHAGE) 1000 MG tablet    SITagliptin (JANUVIA) 100 MG tablet    insulin glargine (LANTUS SOLOSTAR) 100 UNIT/ML injection pen    Catrachito Farr NP, Diabetic Education, [place]    Amb Referral to [address] [place] [address] Ophthalmology    naproxen (NAPROSYN) 500 MG tablet    Gl[facility] - GLENNY Rosen DPM, Podiatry, [place]    terbinafine (LAMISIL) 250 MG tablet    Vitamin D, Cholecalciferol, 50 MCG (2000 UT) CAPS    buprenorphine (BUPRENEX) 10 MCG/HR PTWK    potassium chloride (KLOR-CON M) 20 MEQ extended release tablet    blood glucose Amb Referral to [address] [place] [address] Ophthalmology    naproxen (NAPROSYN) 500 MG tablet    GLENNY Nogueira [place], Podiatry, [place]    terbinafine (LAMISIL) 250 MG tablet    Vitamin D, Cholecalciferol, 50 MCG (2000 UT) CAPS    buprenorphine (BUPRENEX) 10 MCG/HR PTWK    potassium chloride (KLOR-CON M) 20 MEQ extended release tablet    blood glucose monitor kit and supplies    Lancets MISC    blood glucose monitor strips         PLAN   Based on his weight- 70 units of Total daily insulin requirements. Will start him with 35 units long acting glargine. He is very nervous to start with Insulin, no short acting prescribed, will try to escalate the doses and addition of Short acting based on his comfort levels/more practice on how to administer shots/Blood sugars check. His His Insulin to carb ratio is 1 units will cover 7 grams of carb. His correction factor 1 unit will drop blood sugar by 25 mgl/dl. Start him on Metformin and glucophage, Januvia. Refills placed on Losartan and HCTZ  Potassium tabs started  Naproxen refills placed but be cautious given GI/kidney side effects with meals and as minimum as possible. Buprenorphine 10 mcg patch Rx placed- and explained all the side effects as listed above. Diabetic/podiatry/opthalmology referral/nutrtion referrals. Will see him back in 2 weeks  Need to repeat UA/urine cytology for RBCs.       Orders Placed This Encounter   Procedures   [address] [facility] SZUANNE, Diabetic Education, [place]     Referral Priority:   Routine     Referral Type:   Eval and Treat     Referral Reason:   Specialty Services Required     Referred to Provider:   Rick Simons, APRN - CNP     Number of Visits Requested:   1    Amb Referral to Nutrition Services     Referral Priority:   Routine     Referral Type:   Consult for Advice and Opinion     Referral Reason:   Specialty Services Required     Number of Visits Requested:   [facility] Ophthal[facility]     Referral Priority:   Routine     Referral Type:   Eval and Treat     Referral Reason:   Specialty Services Required     Requested Specialty:   Ophthalmology     Number of Visits Requested:   GLENNY Garcia DPM, Podiatry, [place]     Referral Priority:   Routine     Referral Type:   Eval and Treat     Referral Reason:   Specialty Services Required     Referred to Provider:   Sally Rosario DPM     Requested Specialty:   Podiatry     Number of Visits Requested:   1        No follow-ups on file. Patient given educational materials - see patient instructions. Discussed use, benefit, and side effects of prescribed medications. All patient questions answered. Pt voiced understanding. Reviewed health maintenance.        Electronically signed Keo Centeno MD on 3/24/2021 at 2:32 PM EDT

## 2021-03-25 ASSESSMENT — ENCOUNTER SYMPTOMS
COUGH: 0
CHEST TIGHTNESS: 0
BLOOD IN STOOL: 0
SHORTNESS OF BREATH: 0
TROUBLE SWALLOWING: 0
ABDOMINAL PAIN: 0
RHINORRHEA: 0
DIARRHEA: 0
SINUS PAIN: 0
SORE THROAT: 0

## 2021-04-07 ENCOUNTER — PROCEDURE VISIT (OUTPATIENT)
Dept: PODIATRY | Age: 46
End: 2021-04-07
Payer: COMMERCIAL

## 2021-04-07 VITALS
BODY MASS INDEX: 39.2 KG/M2 | DIASTOLIC BLOOD PRESSURE: 84 MMHG | WEIGHT: 280 LBS | HEIGHT: 71 IN | HEART RATE: 88 BPM | SYSTOLIC BLOOD PRESSURE: 138 MMHG

## 2021-04-07 DIAGNOSIS — E11.29 TYPE 2 DIABETES MELLITUS WITH MICROALBUMINURIA, WITH LONG-TERM CURRENT USE OF INSULIN (HCC): Primary | ICD-10-CM

## 2021-04-07 DIAGNOSIS — Z79.4 TYPE 2 DIABETES MELLITUS WITH MICROALBUMINURIA, WITH LONG-TERM CURRENT USE OF INSULIN (HCC): Primary | ICD-10-CM

## 2021-04-07 DIAGNOSIS — R80.9 TYPE 2 DIABETES MELLITUS WITH MICROALBUMINURIA, WITH LONG-TERM CURRENT USE OF INSULIN (HCC): Primary | ICD-10-CM

## 2021-04-07 DIAGNOSIS — M20.40 HAMMER TOE, UNSPECIFIED LATERALITY: ICD-10-CM

## 2021-04-07 DIAGNOSIS — L60.8 DEFORMITY OF NAIL BED: ICD-10-CM

## 2021-04-07 PROCEDURE — 4004F PT TOBACCO SCREEN RCVD TLK: CPT | Performed by: PODIATRIST

## 2021-04-07 PROCEDURE — G8427 DOCREV CUR MEDS BY ELIG CLIN: HCPCS | Performed by: PODIATRIST

## 2021-04-07 PROCEDURE — 99214 OFFICE O/P EST MOD 30 MIN: CPT | Performed by: PODIATRIST

## 2021-04-07 PROCEDURE — 3046F HEMOGLOBIN A1C LEVEL >9.0%: CPT | Performed by: PODIATRIST

## 2021-04-07 PROCEDURE — 99203 OFFICE O/P NEW LOW 30 MIN: CPT | Performed by: PODIATRIST

## 2021-04-07 PROCEDURE — 2022F DILAT RTA XM EVC RTNOPTHY: CPT | Performed by: PODIATRIST

## 2021-04-07 PROCEDURE — G8417 CALC BMI ABV UP PARAM F/U: HCPCS | Performed by: PODIATRIST

## 2021-04-07 NOTE — PATIENT INSTRUCTIONS
Urea 40% cream [email or web address] or may try Demetrio's Vapor rub to right great toenail to soften the nail  Follow up in 1 year for diabetic foot exam

## 2021-04-07 NOTE — PROGRESS NOTES
Subjective:  Jocelyn Lopez is a 41 y.o. male who presents to the office today complaining of a hammertoe deformity Right  Symptoms began year(s) ago. Patient is history of a callus but not flared up recently. Overall patient does not think hammertoes bother him too much. Patient complains of irritation from his nail right big toe. Patient states he is cut this back over time and has never gotten infected recently. Patient relates pain is Present. Pain is rated 1 out of 10 and is described as mild. Treatments prior to today's visit include: cutting back at home. Currently denies F/C/N/V. Allergies   Allergen Reactions    Other      Moral mushrooms    Pcn [Penicillins]      Thinks pcn        Past Medical History:   Diagnosis Date    [facility] ([facility]     History of heroin abuse ([facility]     Hypertension     Personality disorder ([facility]        Prior to Admission medications    Medication Sig Start Date End Date Taking?  Authorizing Provider   hydroCHLOROthiazide (HYDRODIURIL) 25 MG tablet Take 1 tablet by mouth every morning 3/24/21  Yes Luc Araujo MD   losartan (COZAAR) 100 MG tablet Take 1 tablet by mouth daily 3/24/21  Yes Luc Araujo MD   metFORMIN (GLUCOPHAGE) 1000 MG tablet Take 1 tablet by mouth 2 times daily (with meals) 3/24/21  Yes Luc Araujo MD   SITagliptin (JANUVIA) 100 MG tablet Take 1 tablet by mouth daily 3/24/21  Yes Luc Araujo MD   insulin glargine (LANTUS SOLOSTAR) 100 UNIT/ML injection pen Inject 35 Units into the skin nightly 3/24/21  Yes Luc Araujo MD   naproxen (NAPROSYN) 500 MG tablet Take 1 tablet by mouth 2 times daily as needed for Pain 3/24/21  Yes Luc Araujo MD   terbinafine (LAMISIL) 250 MG tablet Take 1 tablet by mouth daily for 14 days 3/24/21 4/7/21 Yes Luc Araujo MD   Vitamin D, Cholecalciferol, 50 MCG (2000 UT) CAPS Take 2,000 Units by mouth daily 3/24/21  Yes Luc Araujo MD   buprenorphine ([address] [facility]) 10 MCG/HR [address] [facility] [address] 1 patch onto the skin once a week for 30 days. 3/24/21 4/23/21 Yes Dilan Segura MD   potassium chloride (KLOR-CON M) 20 MEQ extended release tablet Take 1 tablet by mouth daily 3/24/21  Yes Dilan Segura MD   blood glucose monitor kit and supplies Dispense all needed supplies to include: monitor, strips, lancing device, lancets, control solutions, alcohol swabs. 3/24/21  Yes Dilan Segura MD   Lancets MISC 1 each by Does not apply route 3 times daily 3/24/21  Yes Dilan Segura MD   blood glucose monitor strips Test 3 times a day & as needed for symptoms of irregular blood glucose. Dispense sufficient amount for indicated testing frequency. 3/24/21  Yes Dilan Segura MD   albuterol sulfate  (90 Base) MCG/ACT inhaler Inhale 2 puffs into the lungs every 4 hours as needed for Wheezing or Shortness of Breath (cough) 2/25/21  Yes Dilan Segura MD   budesonide-formoterol (SYMBICORT) 160-4.5 MCG/ACT AERO Inhale 2 puffs into the lungs 2 times daily 2/25/21  Yes Dilan Segura MD   clotrimazole (LOTRIMIN) 1 % cream Apply topically 2 times daily Apply topically 2 times daily.  11/17/20  Yes Dilan Segura MD   metoprolol tartrate (LOPRESSOR) 25 MG tablet Take 1 tablet by mouth 2 times daily 3/18/20  Yes DAWIT Zuleta - CNP   hydrOXYzine (VISTARIL) 50 MG capsule take 1 capsule by mouth at bedtime 12/31/19  Yes Historical Provider, MD   OXcarbazepine (TRILEPTAL) 300 MG tablet Take 1 tablet by mouth daily 3/18/19  Yes Historical Provider, MD   prazosin (MINIPRESS) 2 MG capsule Take 2 capsules by mouth daily 3/18/19  Yes Historical Provider, MD   diclofenac (VOLTAREN) 75 MG EC tablet Take 1 tablet by mouth 2 times daily for 14 days 10/30/20 11/17/20  Marianela Finley MD       Past Surgical History:   Procedure Laterality Date    FINGER SURGERY Left     index finger    KNEE SURGERY Left     had metal removed       Family History   Problem Relation Age of Onset    High Blood Pressure Mother     Diabetes Mother     Kidney Disease Mother     Heart Disease Father     High Blood Pressure Father     Cancer Father        Social History     Tobacco Use    Smoking status: Current Every Day Smoker     Types: Cigarettes    Smokeless tobacco: Current User    Tobacco comment: one pack a day   Substance Use Topics    Alcohol use: Not Currently     Frequency: Never       ROS: All 14 ROS systems reviewed and pertinent positives noted above, all others negative. Lower Extremity Physical Examination:     Vitals:   Vitals:    04/07/21 1029   BP: 138/84   Pulse: 88     General: AAO x 3 in NAD. Vascular: DP and PT pulses palpable 2/4, bilateral.  CFT <3 seconds, bilateral.  Hair growth present to the level of the digits, bilateral.  Edema absent, bilateral.  Varicosities absent, bilateral. Erythema absent, bilateral. Distal Rubor absent bilateral.  Temperature within normal limits bilateral. Hyperpigmentation absent bilateral. No atrophic skin. Neurological: Sensation intact to light touch to level of digits, bilateral.  Protective sensation intact 10/10 sites via 5.07/10g [place] Monofilament, bilateral.  negative Tinel's, bilateral.  negative Valleix sign, bilateral.  Vibratory intact bilateral.  Reflexes Decreased bilateral.  Paresthesias negative. Dysthesias negative. Sharp/dull intact bilateral.    Musculoskeletal: Muscle strength 5/5, bilateral.  Pain absent upon palpation of above mentioned hammerote. within normal limits medial longitudinal arch, Bilateral.  Ankle ROM decreased,Bilateral.  1st MPJ ROM within normal limits, Bilateral.  Dorsally contracted digits present digits, Bilateral. R 4 mild adductovarus rotation. negative Lachman's test.  Other foot deformities none. Integument: Warm, dry, supple, bilateral.  Open lesion absent, bilateral.  Interdigital maceration absent to web spaces bilateral.  Right distal left hallux nail deformed with thickness and mild debris.   No current onychocryptosis. If nail elongates will cause distal impingement it appears. No current paronychia. Fissures absent, bilateral. Hyperkeratotic tissue is absent. Asessment: Patient is a 41 y.o. male with:    Diagnosis Orders   1. Type 2 diabetes mellitus with microalbuminuria, with long-term current use of insulin ([address]     2. Deformity of nail bed     3. Hammer toe, unspecified laterality         Plan: Patient examined and evaluated. Current condition and treatment options discussed in detail. Appropriate shoe gear with room in toe box discussed. Options give for offloading pads (silipos) to wear while in shoe gear. D  DM foot ed and exam   Patient examined and evaluated. Current condition and treatment options discussed in detail. Slant back nail cut took place of R lateral hallux nail. No need for nail procedure currently, any increase in pain of signs of infection then patient will be seen back for a nail procedure. Patient will soak qd in warm soapy water or epsom salts and will use OTC antibiotic ointment daily. Patient low level medical decision-making overall. Patient has 1 stable chronic condition with acute uncomplicated condition. No further testing ordered. Overall low risk morbidity with current treatment course. Contact office with any questions/problems/concerns. RTC in 1year(s).

## 2021-04-14 ENCOUNTER — HOSPITAL ENCOUNTER (OUTPATIENT)
Age: 46
Setting detail: SPECIMEN
Discharge: HOME | End: 2021-04-14
Payer: COMMERCIAL

## 2021-04-14 ENCOUNTER — OFFICE VISIT (OUTPATIENT)
Dept: FAMILY MEDICINE CLINIC | Age: 46
End: 2021-04-14
Payer: COMMERCIAL

## 2021-04-14 VITALS
HEART RATE: 87 BPM | WEIGHT: 282 LBS | SYSTOLIC BLOOD PRESSURE: 148 MMHG | DIASTOLIC BLOOD PRESSURE: 82 MMHG | OXYGEN SATURATION: 98 % | BODY MASS INDEX: 39.33 KG/M2

## 2021-04-14 DIAGNOSIS — R35.89 POLYURIA: ICD-10-CM

## 2021-04-14 DIAGNOSIS — G89.29 CHRONIC PAIN OF RIGHT KNEE: ICD-10-CM

## 2021-04-14 DIAGNOSIS — L60.0 INGROWING NAIL, RIGHT GREAT TOE: ICD-10-CM

## 2021-04-14 DIAGNOSIS — F11.21 OPIOID DEPENDENCE IN REMISSION (HCC): ICD-10-CM

## 2021-04-14 DIAGNOSIS — E78.1 HYPERTRIGLYCERIDEMIA: ICD-10-CM

## 2021-04-14 DIAGNOSIS — J40 BRONCHITIS: ICD-10-CM

## 2021-04-14 DIAGNOSIS — R63.1 POLYDIPSIA: ICD-10-CM

## 2021-04-14 DIAGNOSIS — G89.29 OTHER CHRONIC PAIN: ICD-10-CM

## 2021-04-14 DIAGNOSIS — E78.2 MIXED HYPERLIPIDEMIA: ICD-10-CM

## 2021-04-14 DIAGNOSIS — I10 ESSENTIAL HYPERTENSION: ICD-10-CM

## 2021-04-14 DIAGNOSIS — E55.9 VITAMIN D DEFICIENCY: ICD-10-CM

## 2021-04-14 DIAGNOSIS — M25.561 CHRONIC PAIN OF RIGHT KNEE: ICD-10-CM

## 2021-04-14 DIAGNOSIS — J45.40 MODERATE PERSISTENT ASTHMA, UNSPECIFIED WHETHER COMPLICATED: ICD-10-CM

## 2021-04-14 DIAGNOSIS — R21 RASH: ICD-10-CM

## 2021-04-14 DIAGNOSIS — F14.10 COCAINE ABUSE (HCC): ICD-10-CM

## 2021-04-14 DIAGNOSIS — E11.29 TYPE 2 DIABETES MELLITUS WITH MICROALBUMINURIA, WITH LONG-TERM CURRENT USE OF INSULIN (HCC): Primary | ICD-10-CM

## 2021-04-14 DIAGNOSIS — G89.29 CHRONIC BILATERAL LOW BACK PAIN WITH RIGHT-SIDED SCIATICA: ICD-10-CM

## 2021-04-14 DIAGNOSIS — B35.3 TINEA PEDIS OF LEFT FOOT: ICD-10-CM

## 2021-04-14 DIAGNOSIS — R06.2 WHEEZING: ICD-10-CM

## 2021-04-14 DIAGNOSIS — E11.29 TYPE 2 DIABETES MELLITUS WITH MICROALBUMINURIA, WITH LONG-TERM CURRENT USE OF INSULIN (HCC): ICD-10-CM

## 2021-04-14 DIAGNOSIS — B35.9 RINGWORM: ICD-10-CM

## 2021-04-14 DIAGNOSIS — M54.2 NECK PAIN: ICD-10-CM

## 2021-04-14 DIAGNOSIS — E87.6 HYPOKALEMIA: ICD-10-CM

## 2021-04-14 DIAGNOSIS — M54.41 CHRONIC BILATERAL LOW BACK PAIN WITH RIGHT-SIDED SCIATICA: ICD-10-CM

## 2021-04-14 DIAGNOSIS — R80.9 TYPE 2 DIABETES MELLITUS WITH MICROALBUMINURIA, WITH LONG-TERM CURRENT USE OF INSULIN (HCC): ICD-10-CM

## 2021-04-14 DIAGNOSIS — Z79.4 TYPE 2 DIABETES MELLITUS WITH MICROALBUMINURIA, WITH LONG-TERM CURRENT USE OF INSULIN (HCC): Primary | ICD-10-CM

## 2021-04-14 DIAGNOSIS — R80.9 TYPE 2 DIABETES MELLITUS WITH MICROALBUMINURIA, WITH LONG-TERM CURRENT USE OF INSULIN (HCC): Primary | ICD-10-CM

## 2021-04-14 DIAGNOSIS — F17.200 SMOKER: ICD-10-CM

## 2021-04-14 DIAGNOSIS — Z79.4 TYPE 2 DIABETES MELLITUS WITH MICROALBUMINURIA, WITH LONG-TERM CURRENT USE OF INSULIN (HCC): ICD-10-CM

## 2021-04-14 LAB
AMPHETAMINE SCREEN URINE: NEGATIVE
BARBITURATE SCREEN URINE: NEGATIVE
BENZODIAZEPINE SCREEN, URINE: NEGATIVE
BUPRENORPHINE URINE: NEGATIVE
CANNABINOID SCREEN URINE: NEGATIVE
COCAINE METABOLITE, URINE: NEGATIVE
MDMA URINE: NORMAL
METHADONE SCREEN, URINE: NEGATIVE
METHAMPHETAMINE, URINE: NEGATIVE
OPIATES, URINE: NEGATIVE
OXYCODONE SCREEN URINE: NEGATIVE
PHENCYCLIDINE, URINE: NEGATIVE
PROPOXYPHENE, URINE: NEGATIVE
TEST INFORMATION: NORMAL
TRICYCLIC ANTIDEPRESSANTS, UR: NEGATIVE

## 2021-04-14 PROCEDURE — G8427 DOCREV CUR MEDS BY ELIG CLIN: HCPCS | Performed by: INTERNAL MEDICINE

## 2021-04-14 PROCEDURE — 80306 DRUG TEST PRSMV INSTRMNT: CPT

## 2021-04-14 PROCEDURE — 3046F HEMOGLOBIN A1C LEVEL >9.0%: CPT | Performed by: INTERNAL MEDICINE

## 2021-04-14 PROCEDURE — 99214 OFFICE O/P EST MOD 30 MIN: CPT | Performed by: INTERNAL MEDICINE

## 2021-04-14 PROCEDURE — 2022F DILAT RTA XM EVC RTNOPTHY: CPT | Performed by: INTERNAL MEDICINE

## 2021-04-14 PROCEDURE — 4004F PT TOBACCO SCREEN RCVD TLK: CPT | Performed by: INTERNAL MEDICINE

## 2021-04-14 PROCEDURE — G8417 CALC BMI ABV UP PARAM F/U: HCPCS | Performed by: INTERNAL MEDICINE

## 2021-04-14 PROCEDURE — 99213 OFFICE O/P EST LOW 20 MIN: CPT

## 2021-04-14 RX ORDER — METOPROLOL SUCCINATE 50 MG/1
50 TABLET, EXTENDED RELEASE ORAL DAILY
Qty: 90 TABLET | Refills: 1 | Status: SHIPPED | OUTPATIENT
Start: 2021-04-14 | End: 2021-06-21 | Stop reason: SDUPTHER

## 2021-04-14 RX ORDER — INSULIN GLARGINE 100 [IU]/ML
25 INJECTION, SOLUTION SUBCUTANEOUS 2 TIMES DAILY
Qty: 5 PEN | Refills: 3 | Status: SHIPPED | OUTPATIENT
Start: 2021-04-14 | End: 2021-08-09

## 2021-04-14 RX ORDER — ATORVASTATIN CALCIUM 20 MG/1
20 TABLET, FILM COATED ORAL DAILY
Qty: 30 TABLET | Refills: 3 | Status: SHIPPED | OUTPATIENT
Start: 2021-04-14 | End: 2021-07-27

## 2021-04-14 RX ORDER — DULAGLUTIDE 1.5 MG/.5ML
1.5 INJECTION, SOLUTION SUBCUTANEOUS WEEKLY
Qty: 5 PEN | Refills: 3 | Status: SHIPPED | OUTPATIENT
Start: 2021-04-14 | End: 2021-07-20

## 2021-04-14 RX ORDER — BUPRENORPHINE 2 MG/1
2 TABLET SUBLINGUAL DAILY
Qty: 30 TABLET | Refills: 0 | Status: SHIPPED | OUTPATIENT
Start: 2021-04-14 | End: 2021-05-14

## 2021-04-14 RX ORDER — GLIMEPIRIDE 4 MG/1
4 TABLET ORAL
Qty: 90 TABLET | Refills: 1 | Status: SHIPPED | OUTPATIENT
Start: 2021-04-14 | End: 2021-05-12

## 2021-04-14 RX ORDER — EZETIMIBE 10 MG/1
10 TABLET ORAL DAILY
Qty: 30 TABLET | Refills: 3 | Status: SHIPPED | OUTPATIENT
Start: 2021-04-14 | End: 2021-07-27

## 2021-04-14 NOTE — PROGRESS NOTES
[address]  [address]  Dept: [phone]  Dept Fax: [phone]: [phone]     Visit Date:  4/14/2021    Patient:  Tory Jackson  YOB: 1975    HPI:   Tory Jackson presents today for   Chief Complaint   Patient presents with    1 Month Follow-Up     patient is here today to f/u for diabetes. Avis Records HPI 41year old male with a history of anxiety/borderline personality problems, bipolar disorder, morbid obesity BMI 38,HTN, Asthma/COPD 30ppd smoking, arthritis/chronic low back pain, knee pain,ringworm, heroin/cocaine/opiod abuse/addiction  is coming in coming in for two week follow up. DM type 2-B9M 02.5 complicated with proteinuria nephropathy glucosuria. He reports he feels a lot better after he started taking the medicines as prescribed although he does feel tired and exhausted sometimes but not as much. He has been compliant with his medications he denies any hypoglycemia related events. Went over the blood sugar logs that have shown that his average morning fasting sugars have ranged anywhere around 200s to 250s versus postmeal mostly during dinnertime has ranged anywhere around 300s to 400s. Discussed extensively about tightly controlling the sugars he has cut down on carbs and sodas and high glycemic index food. He is starting a new job and working as a  so is excited about that. He has been living in a sober house and also seeing therapist on a weekly basis in terms of his opioid drug abuse/addiction that is been helping a little bit. I recommended that we should definitely do better job of controlling his blood sugar at this point based on his BMI weight his total daily insulin requirements is close to 70units. If he has been taking 35 units of Lantus every night.   We will go increase it to 25 units twice daily(50 units total of Insulin) for long-acting and do that on a twice daily basis. At this point patient is hesitant to start on short acting insulin and might not be appropriate to cover for his premeal/post meal coverage. he does not feel strongly that he would want to give 3-4 shots on daily basis. He is still finding it very hard to be giving him the shots on daily basis. His Insulin to carb ratio is 1 units will cover 7 grams of carb. His correction factor 1 unit will drop blood sugar by 25 mgl/dl. I will also start him on Trulicity as well as glimepiride for better control and he should be on a look out for any hypoglycemia related symptoms and not to be taking the sulfonylurea without meals. He is on Glucophage and denies any side effects as well as on Januvia. Hyperlipidemia-Target LDL less than 70 and diabetes as well as hypertriglyceridemia we will start him on Zetia as well as Lipitor 20 mg. The 10-year ASCVD risk score (Chasity Leahy, et al., 2013) is: 13.1%    Values used to calculate the score:      Age: 41 years      Sex: Male      Is Non- : No      Diabetic: Yes      Tobacco smoker: Yes      Systolic Blood Pressure: 234 mmHg      Is BP treated: Yes      HDL Cholesterol: 44 mg/dL      Total Cholesterol: 174 mg/dL    Anxiety/borderline personality/Bipolar -Started meds prazosin, vistaril, trileptal    HTN- BP not well controlled. 148/82 and reports most of the time goes up higher>150. Losartan 100 mg as well as using naproxen for his aches and pains and might be aggravating his blood pressure and hydrochlorothiazide 25 mg. We will start him on metoprolol succinate 50 mg once a day for better control. Heroin Abuse/Narcotics Dependence/Opiod addiction/Opiod use disorder-was in rehab, now in sober house He had a ER visit was found on the passenger seat snoring with respiratory rate 8 and was reported to be snorting heroin?cocaine.  Rx for buprenorphine patches placed last time and was not covered via the insurance versus sublingual tablets are covered by the insurance.       Discussed in detail about some of the side effects of buprenorphine/should be very cautious and require closer monitoring some the side effects include CNS depression,sedatiom which may impair physical mental abilities transient elevations and hepatic events can cause hepatotoxicity if there is a history of alcohol IV drug abuser history of hepatitis hypersensitivity-like reactions including bronchospasm and edema and anaphylactic shock reactions hypotension and can worsen the hypotensive related episodes infection on the side of patch QTC prolongation causing arrhythmias, can further exacerbate with history of long QT syndrome worsen with hypokalemia hypomagnesemia unstable heart disease heart failure arrhythmia problems respiratory depression and should not be combined with other sedatives such as alcohol or benzodiazepines and he should not be using any form of opioids. Concomitant use of benzodiazepines or other CNS depressants, including alcohol and opioids, may result in profound sedation, respiratory depression, coma, and death. Abrupt discontinuation/withdrawal: Abrupt discontinuation in patients who are physically dependent to opioids has been associated with serious withdrawal symptoms, uncontrolled pain, attempts to find other opioids (including illicit), and suicide         Medications  Prior to Visit Medications    Medication Sig Taking?  Authorizing Provider   hydroCHLOROthiazide (HYDRODIURIL) 25 MG tablet Take 1 tablet by mouth every morning Yes Les Thomas MD   losartan (COZAAR) 100 MG tablet Take 1 tablet by mouth daily Yes Les Thomas MD   metFORMIN (GLUCOPHAGE) 1000 MG tablet Take 1 tablet by mouth 2 times daily (with meals) Yes Les Thomas MD   SITagliptin (JANUVIA) 100 MG tablet Take 1 tablet by mouth daily Yes Les Thomas MD   insulin glargine (LANTUS SOLOSTAR) 100 UNIT/ML injection pen Allergies:  is allergic to other and pcn [penicillins]. Past Medical History:   has a past medical history of Bipolar 1 disorder ([facility] History of heroin abuse ([facility] Hypertension, and Personality disorder ([facility] Past Surgical History   has a past surgical history that includes knee surgery (Left) and Finger surgery (Left). Family History  family history includes Cancer in his father; Diabetes in his mother; Heart Disease in his father; High Blood Pressure in his father and mother; Kidney Disease in his mother. Social History   reports that he has been smoking cigarettes. He uses smokeless tobacco. He reports previous alcohol use. Health Maintenance:    Health Maintenance   Topic Date Due    Hepatitis C screen  Never done    Pneumococcal 0-64 years Vaccine (1 of 1 - PPSV23) Never done    Diabetic foot exam  Never done    Diabetic retinal exam  Never done    HIV screen  Never done    COVID-19 Vaccine (1) Never done    Hepatitis B vaccine (1 of 3 - Risk 3-dose series) Never done    DTaP/Tdap/Td vaccine (1 - Tdap) Never done    Flu vaccine (Season Ended) 09/01/2021    A1C test (Diabetic or Prediabetic)  03/22/2022    Lipid screen  03/22/2022    Potassium monitoring  03/22/2022    Creatinine monitoring  03/22/2022    Hepatitis A vaccine  Aged Out    Hib vaccine  Aged Out    Meningococcal (ACWY) vaccine  Aged Out       Subjective:      Review of Systems   Constitutional: Negative for fatigue, fever and unexpected weight change. HENT: Negative for ear pain, postnasal drip, rhinorrhea, sinus pain, sore throat and trouble swallowing. Eyes: Negative for visual disturbance. Respiratory: Negative for cough, chest tightness and shortness of breath. Cardiovascular: Negative for chest pain and leg swelling. Gastrointestinal: Negative for abdominal pain, blood in stool and diarrhea. Endocrine: Negative for polyuria. Genitourinary: Negative for difficulty urinating and flank pain. Musculoskeletal: Positive for arthralgias, back pain and myalgias. Negative for joint swelling. Skin: Positive for rash. Allergic/Immunologic: Negative for environmental allergies. Neurological: Negative for weakness, light-headedness, numbness and headaches. Hematological: Negative for adenopathy. Psychiatric/Behavioral: Negative for behavioral problems and suicidal ideas. The patient is not nervous/anxious. Objective:     BP (!) 148/82 (Site: Right Upper Arm, Position: Sitting)   Pulse 87   Wt 282 lb (127.9 kg)   SpO2 98%   BMI 39.33 kg/m²     Physical Exam  Vitals signs and nursing note reviewed. HENT:      Head: Normocephalic and atraumatic. Cardiovascular:      Rate and Rhythm: Normal rate and regular rhythm. Pulses: Normal pulses. Heart sounds: Normal heart sounds. No murmur. No friction rub. No gallop. Pulmonary:      Effort: Pulmonary effort is normal.      Breath sounds: Normal breath sounds. No stridor. Abdominal:      Palpations: Abdomen is soft. Musculoskeletal:      Right lower leg: No edema. Left lower leg: No edema. Skin:     General: Skin is warm. Neurological:      Mental Status: He is oriented to person, place, and time. Mental status is at baseline. Psychiatric:         Mood and Affect: Mood normal.             Assessment       Diagnosis Orders   1. Type 2 diabetes mellitus with microalbuminuria, with long-term current use of insulin (HCC)  insulin glargine (LANTUS SOLOSTAR) 100 UNIT/ML injection pen    Dulaglutide (TRULICITY) 1.5 LE/1.5FN SOPN    glimepiride (AMARYL) 4 MG tablet    metoprolol succinate (TOPROL XL) 50 MG extended release tablet    atorvastatin (LIPITOR) 20 MG tablet    ezetimibe (ZETIA) 10 MG tablet   2.  Polyuria  insulin glargine (LANTUS SOLOSTAR) 100 UNIT/ML injection pen    Dulaglutide (TRULICITY) 1.5 JV/5.1FQ SOPN    glimepiride (AMARYL) 4 MG tablet    metoprolol succinate (TOPROL XL) 50 MG extended release tablet atorvastatin (LIPITOR) 20 MG tablet    ezetimibe (ZETIA) 10 MG tablet   3. Polydipsia  insulin glargine (LANTUS SOLOSTAR) 100 UNIT/ML injection pen    Dulaglutide (TRULICITY) 1.5 RT/3.7DI SOPN    glimepiride (AMARYL) 4 MG tablet    metoprolol succinate (TOPROL XL) 50 MG extended release tablet    atorvastatin (LIPITOR) 20 MG tablet    ezetimibe (ZETIA) 10 MG tablet   4. Vitamin D deficiency  insulin glargine (LANTUS SOLOSTAR) 100 UNIT/ML injection pen    Dulaglutide (TRULICITY) 1.5 OA/4.4GW SOPN    glimepiride (AMARYL) 4 MG tablet    metoprolol succinate (TOPROL XL) 50 MG extended release tablet    atorvastatin (LIPITOR) 20 MG tablet    ezetimibe (ZETIA) 10 MG tablet   5. Essential hypertension  insulin glargine (LANTUS SOLOSTAR) 100 UNIT/ML injection pen    Dulaglutide (TRULICITY) 1.5 RY/1.4EI SOPN    glimepiride (AMARYL) 4 MG tablet    metoprolol succinate (TOPROL XL) 50 MG extended release tablet    atorvastatin (LIPITOR) 20 MG tablet    ezetimibe (ZETIA) 10 MG tablet   6. Hypokalemia  insulin glargine (LANTUS SOLOSTAR) 100 UNIT/ML injection pen    Dulaglutide (TRULICITY) 1.5 HY/1.2YS SOPN    glimepiride (AMARYL) 4 MG tablet    metoprolol succinate (TOPROL XL) 50 MG extended release tablet    atorvastatin (LIPITOR) 20 MG tablet    ezetimibe (ZETIA) 10 MG tablet   7. Hypertriglyceridemia  insulin glargine (LANTUS SOLOSTAR) 100 UNIT/ML injection pen    Dulaglutide (TRULICITY) 1.5 AK/0.0RY SOPN    glimepiride (AMARYL) 4 MG tablet    metoprolol succinate (TOPROL XL) 50 MG extended release tablet    atorvastatin (LIPITOR) 20 MG tablet    ezetimibe (ZETIA) 10 MG tablet   8. Ingrowing nail, right great toe  insulin glargine (LANTUS SOLOSTAR) 100 UNIT/ML injection pen    Dulaglutide (TRULICITY) 1.5 SI/8.3EQ SOPN    glimepiride (AMARYL) 4 MG tablet    metoprolol succinate (TOPROL XL) 50 MG extended release tablet    atorvastatin (LIPITOR) 20 MG tablet    ezetimibe (ZETIA) 10 MG tablet   9.  Ringworm  insulin glargine (LANTUS SOLOSTAR) 100 UNIT/ML injection pen    Dulaglutide (TRULICITY) 1.5 EZ/7.3MU SOPN    glimepiride (AMARYL) 4 MG tablet    metoprolol succinate (TOPROL XL) 50 MG extended release tablet    atorvastatin (LIPITOR) 20 MG tablet    ezetimibe (ZETIA) 10 MG tablet   10. Moderate persistent asthma, unspecified whether complicated  insulin glargine (LANTUS SOLOSTAR) 100 UNIT/ML injection pen    Dulaglutide (TRULICITY) 1.5 MZ/1.3FA SOPN    glimepiride (AMARYL) 4 MG tablet    metoprolol succinate (TOPROL XL) 50 MG extended release tablet    atorvastatin (LIPITOR) 20 MG tablet    ezetimibe (ZETIA) 10 MG tablet   11. Other chronic pain  insulin glargine (LANTUS SOLOSTAR) 100 UNIT/ML injection pen    Dulaglutide (TRULICITY) 1.5 OL/2.8TY SOPN    glimepiride (AMARYL) 4 MG tablet    metoprolol succinate (TOPROL XL) 50 MG extended release tablet    atorvastatin (LIPITOR) 20 MG tablet    ezetimibe (ZETIA) 10 MG tablet   12. Chronic bilateral low back pain with right-sided sciatica  insulin glargine (LANTUS SOLOSTAR) 100 UNIT/ML injection pen    Dulaglutide (TRULICITY) 1.5 AL/4.1MI SOPN    glimepiride (AMARYL) 4 MG tablet    metoprolol succinate (TOPROL XL) 50 MG extended release tablet    atorvastatin (LIPITOR) 20 MG tablet    ezetimibe (ZETIA) 10 MG tablet   13. Rash  insulin glargine (LANTUS SOLOSTAR) 100 UNIT/ML injection pen    Dulaglutide (TRULICITY) 1.5 GV/8.0QC SOPN    glimepiride (AMARYL) 4 MG tablet    metoprolol succinate (TOPROL XL) 50 MG extended release tablet    atorvastatin (LIPITOR) 20 MG tablet    ezetimibe (ZETIA) 10 MG tablet   14. Wheezing  insulin glargine (LANTUS SOLOSTAR) 100 UNIT/ML injection pen    Dulaglutide (TRULICITY) 1.5 QM/8.3FF SOPN    glimepiride (AMARYL) 4 MG tablet    metoprolol succinate (TOPROL XL) 50 MG extended release tablet    atorvastatin (LIPITOR) 20 MG tablet    ezetimibe (ZETIA) 10 MG tablet   15.  Bronchitis  insulin glargine (LANTUS SOLOSTAR) 100 UNIT/ML injection pen    Dulaglutide Cocaine abuse (HCC)  insulin glargine (LANTUS SOLOSTAR) 100 UNIT/ML injection pen    Dulaglutide (TRULICITY) 1.5 WY/4.4UL SOPN    glimepiride (AMARYL) 4 MG tablet    metoprolol succinate (TOPROL XL) 50 MG extended release tablet    atorvastatin (LIPITOR) 20 MG tablet    ezetimibe (ZETIA) 10 MG tablet    buprenorphine (SUBUTEX) 2 MG SUBL SL tablet   22. Mixed hyperlipidemia  Dulaglutide (TRULICITY) 1.5 AY/0.9AJ SOPN    glimepiride (AMARYL) 4 MG tablet    metoprolol succinate (TOPROL XL) 50 MG extended release tablet    atorvastatin (LIPITOR) 20 MG tablet    ezetimibe (ZETIA) 10 MG tablet         PLAN   2 week follow up. Lots of new medications/diabetes management and would need periodic check up. Hypoglycemia education provided. Buprenorphone SL 2mg -low dose prescribed and went over the adverse effects as listed above. Urine drug screen ordered. No orders of the defined types were placed in this encounter. No follow-ups on file. Patient given educational materials - see patient instructions. Discussed use, benefit, and side effects of prescribed medications. All patient questions answered. Pt voiced understanding. Reviewed health maintenance.        Electronically signed Jose Ojeda MD on 4/14/2021 at 2:45 PM EDT

## 2021-04-16 ASSESSMENT — ENCOUNTER SYMPTOMS
ABDOMINAL PAIN: 0
DIARRHEA: 0
COUGH: 0
RHINORRHEA: 0
SHORTNESS OF BREATH: 0
SORE THROAT: 0
TROUBLE SWALLOWING: 0
SINUS PAIN: 0
BLOOD IN STOOL: 0
BACK PAIN: 1
CHEST TIGHTNESS: 0

## 2021-04-29 ENCOUNTER — OFFICE VISIT (OUTPATIENT)
Dept: FAMILY MEDICINE CLINIC | Age: 46
End: 2021-04-29
Payer: COMMERCIAL

## 2021-04-29 VITALS
HEART RATE: 84 BPM | OXYGEN SATURATION: 96 % | SYSTOLIC BLOOD PRESSURE: 130 MMHG | BODY MASS INDEX: 39.61 KG/M2 | DIASTOLIC BLOOD PRESSURE: 80 MMHG | WEIGHT: 284 LBS

## 2021-04-29 DIAGNOSIS — I10 ESSENTIAL HYPERTENSION: ICD-10-CM

## 2021-04-29 DIAGNOSIS — R80.9 TYPE 2 DIABETES MELLITUS WITH MICROALBUMINURIA, WITH LONG-TERM CURRENT USE OF INSULIN (HCC): Primary | ICD-10-CM

## 2021-04-29 DIAGNOSIS — Z79.4 TYPE 2 DIABETES MELLITUS WITH MICROALBUMINURIA, WITH LONG-TERM CURRENT USE OF INSULIN (HCC): Primary | ICD-10-CM

## 2021-04-29 DIAGNOSIS — E11.29 TYPE 2 DIABETES MELLITUS WITH MICROALBUMINURIA, WITH LONG-TERM CURRENT USE OF INSULIN (HCC): Primary | ICD-10-CM

## 2021-04-29 PROCEDURE — 99213 OFFICE O/P EST LOW 20 MIN: CPT

## 2021-04-29 PROCEDURE — 99214 OFFICE O/P EST MOD 30 MIN: CPT | Performed by: INTERNAL MEDICINE

## 2021-04-29 PROCEDURE — 99211 OFF/OP EST MAY X REQ PHY/QHP: CPT | Performed by: INTERNAL MEDICINE

## 2021-04-29 PROCEDURE — G8417 CALC BMI ABV UP PARAM F/U: HCPCS | Performed by: INTERNAL MEDICINE

## 2021-04-29 PROCEDURE — 2022F DILAT RTA XM EVC RTNOPTHY: CPT | Performed by: INTERNAL MEDICINE

## 2021-04-29 PROCEDURE — G8427 DOCREV CUR MEDS BY ELIG CLIN: HCPCS | Performed by: INTERNAL MEDICINE

## 2021-04-29 PROCEDURE — 3046F HEMOGLOBIN A1C LEVEL >9.0%: CPT | Performed by: INTERNAL MEDICINE

## 2021-04-29 PROCEDURE — 4004F PT TOBACCO SCREEN RCVD TLK: CPT | Performed by: INTERNAL MEDICINE

## 2021-04-29 RX ORDER — HYDROXYZINE HYDROCHLORIDE 25 MG/1
25 TABLET, FILM COATED ORAL EVERY 6 HOURS PRN
COMMUNITY
Start: 2021-03-22

## 2021-04-29 ASSESSMENT — PATIENT HEALTH QUESTIONNAIRE - PHQ9
1. LITTLE INTEREST OR PLEASURE IN DOING THINGS: 0
SUM OF ALL RESPONSES TO PHQ9 QUESTIONS 1 & 2: 0
SUM OF ALL RESPONSES TO PHQ QUESTIONS 1-9: 0
SUM OF ALL RESPONSES TO PHQ QUESTIONS 1-9: 0

## 2021-04-29 NOTE — PROGRESS NOTES
[address]  [address]  Dept: [phone]  Dept Fax: [phone]: [phone]     Visit Date:  4/29/2021    Patient:  Nataly Lovell  YOB: 1975    HPI:   Nataly Lovell presents today for   Chief Complaint   Patient presents with    2 Week Follow-Up   . HPI 41year old male with a history of anxiety/borderline personality problems, bipolar disorder, morbid obesity BMI 38,HTN, Asthma/COPD 30ppd smoking, arthritis/chronic low back pain, knee pain,ringworm, heroin/cocaine/opiod abuse/addiction  is coming in for 2 week follow up. Heroin Abuse/Narcotics Dependence/Opiod addiction/Opiod use disorder- Could not get started with Buprenorphine tablets as he is living in sober house. He reports he has been doing fine. Not abusing any street drugs like cocaine. No severe withdrawals like symptoms either. He reports /therapist both are okies having him take the buprenorphine tablets for his addiction. DM type 2- A1C>13.3 -he reports he had one episode of low blood sugar around 98 and he stopped all the new  medications that he was prescribed including the metoprolol Lipitor Zetia and Trulicity as well as glimepiride. He was not sure which medicine would have caused those kind of symptoms where he started profusely sweating all over his head and neck area. nursing staff at the sober house did check his blood pressure was fine and his blood sugar was 98 which is the lowest he has seen in the past several days. He has been hovering mostly around 140s to 180s for fasting and closer to 200s for pre and post meals based on his home blood sugar monitoring data today. Doing fairly well compared to last time. Medications  Prior to Visit Medications    Medication Sig Taking?  Authorizing Provider   ULTICARE SHORT PEN NEEDLES 31G X 8 MM MISC   Historical Provider, MD   hydrOXYzine (ATARAX) 25 MG tablet   Historical Provider, MD   insulin glargine (LANTUS SOLOSTAR) 100 UNIT/ML injection pen Inject 25 Units into the skin 2 times daily  Dario Ibrahim MD   Dulaglutide (TRULICITY) 1.5 NX/7.4EI SOPN Inject 1.5 mg into the skin once a week  Dario Ibrahim MD   glimepiride (AMARYL) 4 MG tablet Take 1 tablet by mouth every morning (before breakfast)  Dario Ibrahim MD   metoprolol succinate (TOPROL XL) 50 MG extended release tablet Take 1 tablet by mouth daily  Dario Ibrahim MD   atorvastatin (LIPITOR) 20 MG tablet Take 1 tablet by mouth daily  Dario Ibrahim MD   ezetimibe (ZETIA) 10 MG tablet Take 1 tablet by mouth daily  Dario Ibrahim MD   buprenorphine (SUBUTEX) 2 MG SUBL SL tablet Place 1 tablet under the tongue daily for 30 days. Dario Ibrahim MD   hydroCHLOROthiazide (HYDRODIURIL) 25 MG tablet Take 1 tablet by mouth every morning  Dario Ibrahim MD   losartan (COZAAR) 100 MG tablet Take 1 tablet by mouth daily  Dario Ibrahim MD   metFORMIN (GLUCOPHAGE) 1000 MG tablet Take 1 tablet by mouth 2 times daily (with meals)  Dario Ibrahim MD   SITagliptin (JANUVIA) 100 MG tablet Take 1 tablet by mouth daily  Dario Ibrahim MD   naproxen (NAPROSYN) 500 MG tablet Take 1 tablet by mouth 2 times daily as needed for Pain  Dario Ibrahim MD   Vitamin D, Cholecalciferol, 50 MCG (2000 UT) CAPS Take 2,000 Units by mouth daily  Dario Ibrahim MD   potassium chloride (KLOR-CON M) 20 MEQ extended release tablet Take 1 tablet by mouth daily  Dario Ibrahim MD   blood glucose monitor kit and supplies Dispense all needed supplies to include: monitor, strips, lancing device, lancets, control solutions, alcohol swabs. Dario Ibrahim MD   Lancets MISC 1 each by Does not apply route 3 times daily  Dario Ibrahim MD   blood glucose monitor strips Test 3 times a day & as needed for symptoms of irregular blood glucose.  Dispense sufficient amount for indicated testing frequency. Keny Ludwig MD   albuterol sulfate  (90 Base) MCG/ACT inhaler Inhale 2 puffs into the lungs every 4 hours as needed for Wheezing or Shortness of Breath (cough)  Keny Ludwig MD   budesonide-formoterol (SYMBICORT) 160-4.5 MCG/ACT AERO Inhale 2 puffs into the lungs 2 times daily  Keny Ludwig MD   clotrimazole (LOTRIMIN) 1 % cream Apply topically 2 times daily Apply topically 2 times daily. Keny Ludwig MD   diclofenac (VOLTAREN) 75 MG EC tablet Take 1 tablet by mouth 2 times daily for 14 days  Petey aCrias MD   hydrOXYzine (VISTARIL) 50 MG capsule take 1 capsule by mouth at bedtime  Historical Provider, MD   OXcarbazepine (TRILEPTAL) 300 MG tablet Take 1 tablet by mouth daily  Historical Provider, MD   prazosin (MINIPRESS) 2 MG capsule Take 2 capsules by mouth daily  Historical Provider, MD        Allergies:  is allergic to other and pcn [penicillins]. Past Medical History:   has a past medical history of Bipolar 1 disorder ([facility] History of heroin abuse ([facility] Hypertension, and Personality disorder ([facility] Past Surgical History   has a past surgical history that includes knee surgery (Left) and Finger surgery (Left). Family History  family history includes Cancer in his father; Diabetes in his mother; Heart Disease in his father; High Blood Pressure in his father and mother; Kidney Disease in his mother. Social History   reports that he has been smoking cigarettes. He uses smokeless tobacco. He reports previous alcohol use.     Health Maintenance:    Health Maintenance   Topic Date Due    Hepatitis C screen  Never done    Pneumococcal 0-64 years Vaccine (1 of 1 - PPSV23) Never done    Diabetic foot exam  Never done    Diabetic retinal exam  Never done    HIV screen  Never done    COVID-19 Vaccine (1) Never done    Hepatitis B vaccine (1 of 3 - Risk 3-dose series) Never done    DTaP/Tdap/Td vaccine (1 - Tdap) Never done    Flu vaccine (Season Ended) 09/01/2021  A1C test (Diabetic or Prediabetic)  03/22/2022    Lipid screen  03/22/2022    Potassium monitoring  03/22/2022    Creatinine monitoring  03/22/2022    Hepatitis A vaccine  Aged Out    Hib vaccine  Aged Out    Meningococcal (ACWY) vaccine  Aged Out       Subjective:      Review of Systems   Constitutional: Negative for fatigue, fever and unexpected weight change. HENT: Negative for ear pain, postnasal drip, rhinorrhea, sinus pain, sore throat and trouble swallowing. Eyes: Negative for visual disturbance. Respiratory: Negative for cough, chest tightness and shortness of breath. Cardiovascular: Negative for chest pain and leg swelling. Gastrointestinal: Negative for abdominal pain, blood in stool and diarrhea. Endocrine: Negative for polyuria. Genitourinary: Negative for difficulty urinating and flank pain. Musculoskeletal: Negative for arthralgias, joint swelling and myalgias. Skin: Negative for rash. Allergic/Immunologic: Negative for environmental allergies. Neurological: Negative for weakness, light-headedness, numbness and headaches. Hematological: Negative for adenopathy. Psychiatric/Behavioral: Negative for behavioral problems and suicidal ideas. The patient is not nervous/anxious. Objective:     /80 (Site: Left Upper Arm, Position: Sitting, Cuff Size: Large Adult)   Pulse 84   Wt 284 lb (128.8 kg)   SpO2 96%   BMI 39.61 kg/m²     Physical Exam  Vitals signs and nursing note reviewed. HENT:      Head: Normocephalic and atraumatic. Cardiovascular:      Rate and Rhythm: Normal rate and regular rhythm. Pulmonary:      Effort: Pulmonary effort is normal.      Breath sounds: No stridor. Abdominal:      Palpations: Abdomen is soft. Skin:     General: Skin is warm. Neurological:      General: No focal deficit present. Mental Status: He is oriented to person, place, and time. Mental status is at baseline.    Psychiatric:         Mood and Affect: Mood normal.             Assessment       Diagnosis Orders   1. Type 2 diabetes mellitus with microalbuminuria, with long-term current use of insulin ([address]     2. Essential hypertension           PLAN   Suspecting hypoglycemia related event. We will stop his glimepiride. Patient encouraged to take Trulicity once a week shot this week and slowly restart the other medications including the metoprolol x1 week followed by Lipitor followed by Zetia basically 1 drug at a time to prevent any form of side effects and see me back in 2 weeks. Continue with blood sugars monitoring. Rules to prevent hypoglycemia. The 15-15 rulehave 15 grams of carbohydrate to raise your blood sugar and check it after 15 minutes. If its still below 70 mg/dL, have another serving. Repeat these steps until your blood sugar is at least 70 mg/dL. Once your blood sugar is back to normal, eat a meal or snack to make sure it doesnt lower again. This may be:    Glucose tablets (see instructions)  Gel tube (see instructions)  4 ounces (1/2 cup) of juice or regular soda (not diet)  1 tablespoon of sugar, honey, or corn syrup  Hard candies, jellybeans or gumdropssee food label for how many to consume      Many people tend to want to eat as much as they can until they feel better. This can cause blood sugar levels to shoot way up. Using the step-wise approach of the \"15-15 Rule\" can help you avoid this, preventing high blood sugar levels. No orders of the defined types were placed in this encounter. No follow-ups on file. Patient given educational materials - see patient instructions. Discussed use, benefit, and side effects of prescribed medications. All patient questions answered. Pt voiced understanding. Reviewed health maintenance.        Electronically signed Pietro Frost MD on 4/29/2021 at 1:18 PM EDT

## 2021-05-03 ASSESSMENT — ENCOUNTER SYMPTOMS
DIARRHEA: 0
SINUS PAIN: 0
SHORTNESS OF BREATH: 0
BLOOD IN STOOL: 0
TROUBLE SWALLOWING: 0
RHINORRHEA: 0
SORE THROAT: 0
COUGH: 0
ABDOMINAL PAIN: 0
CHEST TIGHTNESS: 0

## 2021-05-10 DIAGNOSIS — B35.9 RINGWORM: ICD-10-CM

## 2021-05-10 DIAGNOSIS — J40 BRONCHITIS: ICD-10-CM

## 2021-05-10 DIAGNOSIS — E87.6 HYPOKALEMIA: ICD-10-CM

## 2021-05-10 DIAGNOSIS — Z79.4 TYPE 2 DIABETES MELLITUS WITH MICROALBUMINURIA, WITH LONG-TERM CURRENT USE OF INSULIN (HCC): ICD-10-CM

## 2021-05-10 DIAGNOSIS — I10 ESSENTIAL HYPERTENSION: ICD-10-CM

## 2021-05-10 DIAGNOSIS — R80.9 TYPE 2 DIABETES MELLITUS WITH MICROALBUMINURIA, WITH LONG-TERM CURRENT USE OF INSULIN (HCC): ICD-10-CM

## 2021-05-10 DIAGNOSIS — B35.3 TINEA PEDIS OF LEFT FOOT: ICD-10-CM

## 2021-05-10 DIAGNOSIS — R63.1 POLYDIPSIA: ICD-10-CM

## 2021-05-10 DIAGNOSIS — E11.29 TYPE 2 DIABETES MELLITUS WITH MICROALBUMINURIA, WITH LONG-TERM CURRENT USE OF INSULIN (HCC): ICD-10-CM

## 2021-05-10 DIAGNOSIS — E55.9 VITAMIN D DEFICIENCY: ICD-10-CM

## 2021-05-10 DIAGNOSIS — F11.21 OPIOID DEPENDENCE IN REMISSION (HCC): ICD-10-CM

## 2021-05-10 DIAGNOSIS — G89.29 CHRONIC BILATERAL LOW BACK PAIN WITH RIGHT-SIDED SCIATICA: ICD-10-CM

## 2021-05-10 DIAGNOSIS — R35.89 POLYURIA: ICD-10-CM

## 2021-05-10 DIAGNOSIS — F14.10 COCAINE ABUSE (HCC): ICD-10-CM

## 2021-05-10 DIAGNOSIS — R06.2 WHEEZING: ICD-10-CM

## 2021-05-10 DIAGNOSIS — M25.561 CHRONIC PAIN OF RIGHT KNEE: ICD-10-CM

## 2021-05-10 DIAGNOSIS — R21 RASH: ICD-10-CM

## 2021-05-10 DIAGNOSIS — F17.200 SMOKER: ICD-10-CM

## 2021-05-10 DIAGNOSIS — M54.41 CHRONIC BILATERAL LOW BACK PAIN WITH RIGHT-SIDED SCIATICA: ICD-10-CM

## 2021-05-10 DIAGNOSIS — L60.0 INGROWING NAIL, RIGHT GREAT TOE: ICD-10-CM

## 2021-05-10 DIAGNOSIS — G89.29 OTHER CHRONIC PAIN: ICD-10-CM

## 2021-05-10 DIAGNOSIS — G89.29 CHRONIC PAIN OF RIGHT KNEE: ICD-10-CM

## 2021-05-10 DIAGNOSIS — J45.40 MODERATE PERSISTENT ASTHMA, UNSPECIFIED WHETHER COMPLICATED: ICD-10-CM

## 2021-05-10 DIAGNOSIS — E78.1 HYPERTRIGLYCERIDEMIA: ICD-10-CM

## 2021-05-10 DIAGNOSIS — M54.2 NECK PAIN: ICD-10-CM

## 2021-05-10 RX ORDER — POTASSIUM CHLORIDE 20 MEQ/1
20 TABLET, EXTENDED RELEASE ORAL DAILY
Qty: 30 TABLET | Refills: 1 | Status: SHIPPED | OUTPATIENT
Start: 2021-05-10 | End: 2021-06-21 | Stop reason: SDUPTHER

## 2021-05-12 ENCOUNTER — OFFICE VISIT (OUTPATIENT)
Dept: FAMILY MEDICINE CLINIC | Age: 46
End: 2021-05-12
Payer: COMMERCIAL

## 2021-05-12 VITALS
HEART RATE: 91 BPM | WEIGHT: 288 LBS | BODY MASS INDEX: 40.17 KG/M2 | DIASTOLIC BLOOD PRESSURE: 98 MMHG | OXYGEN SATURATION: 96 % | SYSTOLIC BLOOD PRESSURE: 162 MMHG

## 2021-05-12 DIAGNOSIS — M54.41 CHRONIC BILATERAL LOW BACK PAIN WITH RIGHT-SIDED SCIATICA: ICD-10-CM

## 2021-05-12 DIAGNOSIS — F17.200 SMOKER: ICD-10-CM

## 2021-05-12 DIAGNOSIS — R53.83 OTHER FATIGUE: ICD-10-CM

## 2021-05-12 DIAGNOSIS — G89.29 CHRONIC BILATERAL LOW BACK PAIN WITH RIGHT-SIDED SCIATICA: ICD-10-CM

## 2021-05-12 DIAGNOSIS — Z79.4 TYPE 2 DIABETES MELLITUS WITH MICROALBUMINURIA, WITH LONG-TERM CURRENT USE OF INSULIN (HCC): Primary | ICD-10-CM

## 2021-05-12 DIAGNOSIS — R80.9 TYPE 2 DIABETES MELLITUS WITH MICROALBUMINURIA, WITH LONG-TERM CURRENT USE OF INSULIN (HCC): Primary | ICD-10-CM

## 2021-05-12 DIAGNOSIS — E11.29 TYPE 2 DIABETES MELLITUS WITH MICROALBUMINURIA, WITH LONG-TERM CURRENT USE OF INSULIN (HCC): Primary | ICD-10-CM

## 2021-05-12 PROCEDURE — 3046F HEMOGLOBIN A1C LEVEL >9.0%: CPT | Performed by: INTERNAL MEDICINE

## 2021-05-12 PROCEDURE — 99213 OFFICE O/P EST LOW 20 MIN: CPT

## 2021-05-12 PROCEDURE — G8417 CALC BMI ABV UP PARAM F/U: HCPCS | Performed by: INTERNAL MEDICINE

## 2021-05-12 PROCEDURE — 4004F PT TOBACCO SCREEN RCVD TLK: CPT | Performed by: INTERNAL MEDICINE

## 2021-05-12 PROCEDURE — 99214 OFFICE O/P EST MOD 30 MIN: CPT | Performed by: INTERNAL MEDICINE

## 2021-05-12 PROCEDURE — 2022F DILAT RTA XM EVC RTNOPTHY: CPT | Performed by: INTERNAL MEDICINE

## 2021-05-12 PROCEDURE — G8427 DOCREV CUR MEDS BY ELIG CLIN: HCPCS | Performed by: INTERNAL MEDICINE

## 2021-05-12 RX ORDER — NAPROXEN 500 MG/1
500 TABLET ORAL 2 TIMES DAILY PRN
Qty: 180 TABLET | Refills: 3 | Status: SHIPPED | OUTPATIENT
Start: 2021-05-12 | End: 2022-03-08

## 2021-05-12 NOTE — PROGRESS NOTES
[address]  [address]  Dept: [phone]  Dept Fax: [phone]: [phone]     Visit Date:  5/12/2021    Patient:  Abigail Ivan  YOB: 1975    HPI:   Abigail Ivan presents today for   Chief Complaint   Patient presents with    Diabetes     patient is here today for a follow up on diabetes. Luz Palm HPI 41year old male with a history of anxiety/borderline personality problems, bipolar disorder, morbid obesity BMI 38,HTN, Asthma/COPD 30ppd smoking, arthritis/chronic low back pain, knee pain,ringworm, heroin/cocaine/opiod abuse/addiction  is coming in for 2 week follow up.     DM type 2-A1C>13.3 he forgot to bring his blood sugar log today but he reports his blood sugar has been running around fasting for 130s. The highest he seen is around 170s nothing above 200s. He is cut back a lot on eating a lot of ice creams pop sodas and has been drinking and eating more healthy snacks. He used to drink 2 monster drinks but now down to 1 red bull a day. Advised him to cut back on energy drinks as well which definitely can spike of the cortisol levels and further worsen the glucose in the body. He feels a lot better. Since last seen he said he is still has not started using the Trulicity. We stopped his glimepiride because of an episode of low blood sugars he denies any further episodes of hypoglycemia. I will still prescribe him some glucose tablets for now to keep an eye on this. He is requesting refills on his naproxen today to for his arthralgias/arthritis. Medications  Prior to Visit Medications    Medication Sig Taking?  Authorizing Provider   potassium chloride (KLOR-CON M) 20 MEQ extended release tablet TAKE 1 TABLET BY MOUTH DAILY Yes Cortney Reynolds MD   ULTICARE SHORT PEN NEEDLES 31G X 8 MM MISC  Yes Historical Provider, MD   hydrOXYzine (ATARAX) 25 MG tablet  Yes Historical Provider, MD   insulin glargine (LANTUS SOLOSTAR) 100 UNIT/ML injection pen Inject 25 Units into the skin 2 times daily Yes Damian Ramos MD   metoprolol succinate (TOPROL XL) 50 MG extended release tablet Take 1 tablet by mouth daily Yes Damian Ramos MD   hydroCHLOROthiazide (HYDRODIURIL) 25 MG tablet Take 1 tablet by mouth every morning Yes Damian Ramos MD   losartan (COZAAR) 100 MG tablet Take 1 tablet by mouth daily Yes Damian Ramos MD   metFORMIN (GLUCOPHAGE) 1000 MG tablet Take 1 tablet by mouth 2 times daily (with meals) Yes Damian Ramos MD   SITagliptin (JANUVIA) 100 MG tablet Take 1 tablet by mouth daily Yes Damian Ramos MD   naproxen (NAPROSYN) 500 MG tablet Take 1 tablet by mouth 2 times daily as needed for Pain Yes Damian Ramos MD   Vitamin D, Cholecalciferol, 50 MCG (2000 UT) CAPS Take 2,000 Units by mouth daily Yes Damian Ramos MD   blood glucose monitor kit and supplies Dispense all needed supplies to include: monitor, strips, lancing device, lancets, control solutions, alcohol swabs. Yes Damian Ramos MD   Lancets MISC 1 each by Does not apply route 3 times daily Yes Damian Ramos MD   blood glucose monitor strips Test 3 times a day & as needed for symptoms of irregular blood glucose. Dispense sufficient amount for indicated testing frequency.  Yes Damian Ramos MD   albuterol sulfate  (90 Base) MCG/ACT inhaler Inhale 2 puffs into the lungs every 4 hours as needed for Wheezing or Shortness of Breath (cough) Yes Damian Ramos MD   budesonide-formoterol (SYMBICORT) 160-4.5 MCG/ACT AERO Inhale 2 puffs into the lungs 2 times daily Yes Damian Ramos MD   hydrOXYzine (VISTARIL) 50 MG capsule take 1 capsule by mouth at bedtime Yes Historical Provider, MD   OXcarbazepine (TRILEPTAL) 300 MG tablet Take 1 tablet by mouth daily Yes Historical Provider, MD   prazosin (MINIPRESS) 2 MG capsule Take 2 capsules by mouth daily Yes Historical Provider, MD   Dulaglutide (TRULICITY) 1.5 YA/3.4HL SOPN Inject 1.5 mg into the skin once a week  Patient not taking: Reported on 4/29/2021  Mo Toledo MD   glimepiride (AMARYL) 4 MG tablet Take 1 tablet by mouth every morning (before breakfast)  Patient not taking: Reported on 4/29/2021  Mo Toledo MD   atorvastatin (LIPITOR) 20 MG tablet Take 1 tablet by mouth daily  Patient not taking: Reported on 5/12/2021  Mo Toledo MD   ezetimibe (ZETIA) 10 MG tablet Take 1 tablet by mouth daily  Patient not taking: Reported on 4/29/2021  Mo Toledo MD   buprenorphine (SUBUTEX) 2 MG SUBL SL tablet Place 1 tablet under the tongue daily for 30 days. Patient not taking: Reported on 4/29/2021  Mo Toledo MD   diclofenac (VOLTAREN) 75 MG EC tablet Take 1 tablet by mouth 2 times daily for 14 days  Kristal Erickson MD        Allergies:  is allergic to amoxicillin, other, and pcn [penicillins]. Past Medical History:   has a past medical history of Bipolar 1 disorder ([facility] History of heroin abuse ([facility] [address] Hypertension, and Personality disorder ([facility] [address] Past Surgical History   has a past surgical history that includes knee surgery (Left) and Finger surgery (Left). Family History  family history includes Cancer in his father; Diabetes in his mother; Heart Disease in his father; High Blood Pressure in his father and mother; Kidney Disease in his mother. Social History   reports that he has been smoking cigarettes. He uses smokeless tobacco. He reports previous alcohol use.     Health Maintenance:    Health Maintenance   Topic Date Due    Hepatitis C screen  Never done    Pneumococcal 0-64 years Vaccine (1 of 2 - PPSV23) Never done    Diabetic foot exam  Never done    Diabetic retinal exam  Never done    COVID-19 Vaccine (1) Never done    HIV screen  Never done    Hepatitis B vaccine (1 of 3 - Risk 3-dose series) Never done    DTaP/Tdap/Td vaccine (1 - Tdap) Never done    Flu vaccine (Season Ended) 09/01/2021    A1C test (Diabetic or Prediabetic)  03/22/2022    Lipid screen  03/22/2022    Potassium monitoring  03/22/2022    Creatinine monitoring  03/22/2022    Hepatitis A vaccine  Aged Out    Hib vaccine  Aged Out    Meningococcal (ACWY) vaccine  Aged Out       Subjective:      Review of Systems   Constitutional: Negative for fatigue, fever and unexpected weight change. HENT: Negative for ear pain, postnasal drip, rhinorrhea, sinus pain, sore throat and trouble swallowing. Eyes: Negative for visual disturbance. Respiratory: Negative for cough, chest tightness and shortness of breath. Cardiovascular: Negative for chest pain and leg swelling. Gastrointestinal: Negative for abdominal pain, blood in stool and diarrhea. Endocrine: Negative for polyuria. Genitourinary: Negative for difficulty urinating and flank pain. Musculoskeletal: Positive for arthralgias and myalgias. Negative for joint swelling. Skin: Negative for rash. Allergic/Immunologic: Negative for environmental allergies. Neurological: Negative for weakness, light-headedness, numbness and headaches. Hematological: Negative for adenopathy. Psychiatric/Behavioral: Negative for behavioral problems and suicidal ideas. The patient is not nervous/anxious. Objective:     BP (!) 162/98 (Site: Right Upper Arm, Position: Sitting)   Pulse 91   Wt 288 lb (130.6 kg)   SpO2 96%   BMI 40.17 kg/m²     Physical Exam  Vitals and nursing note reviewed. HENT:      Head: Normocephalic and atraumatic. Cardiovascular:      Rate and Rhythm: Normal rate and regular rhythm. Pulses: Normal pulses. Heart sounds: Normal heart sounds. No murmur heard. No gallop. Pulmonary:      Effort: Pulmonary effort is normal.      Breath sounds: Normal breath sounds. No stridor. Abdominal:      General: Abdomen is flat. Palpations: Abdomen is soft. Musculoskeletal:      Right lower leg: No edema.       Left lower

## 2021-05-17 ASSESSMENT — ENCOUNTER SYMPTOMS
DIARRHEA: 0
CHEST TIGHTNESS: 0
SHORTNESS OF BREATH: 0
SINUS PAIN: 0
RHINORRHEA: 0
ABDOMINAL PAIN: 0
COUGH: 0
TROUBLE SWALLOWING: 0
SORE THROAT: 0
BLOOD IN STOOL: 0

## 2021-05-18 ENCOUNTER — OFFICE VISIT (OUTPATIENT)
Dept: DIABETES SERVICES | Age: 46
End: 2021-05-18
Payer: COMMERCIAL

## 2021-05-18 VITALS
HEART RATE: 88 BPM | HEIGHT: 71 IN | DIASTOLIC BLOOD PRESSURE: 83 MMHG | SYSTOLIC BLOOD PRESSURE: 129 MMHG | RESPIRATION RATE: 16 BRPM | BODY MASS INDEX: 40.4 KG/M2 | WEIGHT: 288.6 LBS

## 2021-05-18 DIAGNOSIS — Z79.4 TYPE 2 DIABETES MELLITUS WITH MICROALBUMINURIA, WITH LONG-TERM CURRENT USE OF INSULIN (HCC): Primary | ICD-10-CM

## 2021-05-18 DIAGNOSIS — E11.29 TYPE 2 DIABETES MELLITUS WITH MICROALBUMINURIA, WITH LONG-TERM CURRENT USE OF INSULIN (HCC): Primary | ICD-10-CM

## 2021-05-18 DIAGNOSIS — R80.9 TYPE 2 DIABETES MELLITUS WITH MICROALBUMINURIA, WITH LONG-TERM CURRENT USE OF INSULIN (HCC): Primary | ICD-10-CM

## 2021-05-18 DIAGNOSIS — I10 ESSENTIAL HYPERTENSION: ICD-10-CM

## 2021-05-18 DIAGNOSIS — E66.01 CLASS 3 SEVERE OBESITY DUE TO EXCESS CALORIES WITH SERIOUS COMORBIDITY AND BODY MASS INDEX (BMI) OF 40.0 TO 44.9 IN ADULT (HCC): ICD-10-CM

## 2021-05-18 DIAGNOSIS — E78.1 HYPERTRIGLYCERIDEMIA: ICD-10-CM

## 2021-05-18 PROCEDURE — 2022F DILAT RTA XM EVC RTNOPTHY: CPT | Performed by: NURSE PRACTITIONER

## 2021-05-18 PROCEDURE — G8417 CALC BMI ABV UP PARAM F/U: HCPCS | Performed by: NURSE PRACTITIONER

## 2021-05-18 PROCEDURE — 4004F PT TOBACCO SCREEN RCVD TLK: CPT | Performed by: NURSE PRACTITIONER

## 2021-05-18 PROCEDURE — 99215 OFFICE O/P EST HI 40 MIN: CPT | Performed by: NURSE PRACTITIONER

## 2021-05-18 PROCEDURE — 99214 OFFICE O/P EST MOD 30 MIN: CPT

## 2021-05-18 PROCEDURE — 3046F HEMOGLOBIN A1C LEVEL >9.0%: CPT | Performed by: NURSE PRACTITIONER

## 2021-05-18 PROCEDURE — G8427 DOCREV CUR MEDS BY ELIG CLIN: HCPCS | Performed by: NURSE PRACTITIONER

## 2021-05-18 ASSESSMENT — ENCOUNTER SYMPTOMS
SHORTNESS OF BREATH: 0
RESPIRATORY NEGATIVE: 1
DIARRHEA: 0
ABDOMINAL PAIN: 0

## 2021-05-18 NOTE — PROGRESS NOTES
Case [address]  [address] DIABETES MGMT A DEPARTMENT OF [address]  [address] [facility], [address]  [facility] [address] [phone]        HISTORY:    Arely Vines presents today for evaluation and management of:  Chief Complaint   Patient presents with    Diabetes     new patient dx march 2021 type 2        HPI    Interval History:    Past DM Medications   Glimepiride- hypoglcyemia. Current Diabetic Medications  Metformin 1000 mg bid, lantus 25 units bid,trulicity 1.5 mg once weekly, januvia 100 mg once daily     DKA episodes: 0    05/18/21   He was recently seen by pcp and found to have significantly elevated a1c. 13.3%. he was started on metformin lantus januvia and trulicity. He does state he thinks about 4 years ago had an Er visit for pancreatitis. He does reside at a group home and recovering from drug addition, he works part time at a [employer]. Diet: regular  Exercise: none  BS testing: bid  130-199  Issues: denies     High cholesterol-  Takes lipitor and zetia and denies any adverse effects with its use. Watches diet and exercise. Hypertension-  Takes toprol xl, zetia, hctz, cozaarand denies any adverse effects with their use. Watches diet and exercise. Denies any chest pain, dizziness or edema. Obesity- Working on weight loss.        Past Medical History:   Diagnosis Date    Bipolar 1 disorder ([facility]     History of heroin abuse ([facility] [facility]     Hypertension     Personality disorder ([facility] [address]      Family History   Problem Relation Age of Onset    High Blood Pressure Mother     Diabetes Mother     Kidney Disease Mother     Heart Disease Father     High Blood Pressure Father     Cancer Father      Social History     Tobacco Use    Smoking status: Current Every Day Smoker     Types: Cigarettes    Smokeless tobacco: Current User    Tobacco comment: one pack a day   Vaping Use    Vaping Use: Never used   Substance Use Topics    Alcohol use: Not CO2 27 02/12/2019    BUN 9 02/12/2019    CREATININE 0.94 02/12/2019    GLUCOSE 102 (H) 02/12/2019    CALCIUM 9.8 02/12/2019    PROT 7.6 02/12/2019    LABALBU 4.5 02/12/2019    BILITOT 0.49 02/12/2019    ALKPHOS 86 02/12/2019    AST 35 02/12/2019    ALT 36 02/12/2019    LABGLOM >60 02/12/2019    GFRAA >60 02/12/2019     Lab Results   Component Value Date    CHOL 174 02/12/2019     Lab Results   Component Value Date    TRIG 147 02/12/2019     Lab Results   Component Value Date    HDL 44 02/12/2019     Lab Results   Component Value Date    LDLCHOLESTEROL 101 02/12/2019     Lab Results   Component Value Date    VLDL NOT REPORTED 02/12/2019     Lab Results   Component Value Date    CHOLHDLRATIO 4.0 02/12/2019           Physical Exam  Constitutional:       Appearance: He is well-developed. Eyes:      Pupils: Pupils are equal, round, and reactive to light. Cardiovascular:      Rate and Rhythm: Normal rate and regular rhythm. Pulmonary:      Effort: Pulmonary effort is normal.      Breath sounds: Normal breath sounds. Skin:     General: Skin is warm and dry. Findings: No lesion (no lipohypertrophy) or rash. Neurological:      Mental Status: He is alert and oriented to person, place, and time. Sensory: No sensory deficit. Psychiatric:         Speech: Speech normal.         Behavior: Behavior normal.         Thought Content: Thought content normal.         Judgment: Judgment normal.           ASSESSMENT/PLAN:     Diagnosis Orders   1. Type 2 diabetes mellitus with microalbuminuria, with long-term current use of insulin ([address]     2. Hypertriglyceridemia     3. Essential hypertension     4. BMI 40.0-44.9, adult ([facility])     5. Class 3 severe obesity due to excess calories with serious comorbidity and body mass index (BMI) of 40.0 to 44.9 in adult [facility])       No orders of the defined types were placed in this encounter. No orders of the defined types were placed in this encounter.     Requested disease, stroke, kidney disease, and possibly need for dialysis. They were told to be careful with their foot care as diabetics often have nerve damage, infections and risk for limb amutations . They also need a dilated eye exam yearly. We discussed the issues of diet, exercise, medication, complication avoidance, reviewed the signs and symptoms of diabetes, hypoglycemic episodes, significance of HbA1C.         2. Hypertriglyceridemia  stable, lipid panel reviewed, continue current medications. Diet and exercise      3. Essential hypertension   stable, continue current medications. Diet and exercise Seek emergent care if chest pain develops. 4. BMI 40.0-44.9, adult ([facility])  5. Class 3 severe obesity due to excess calories with serious comorbidity and body mass index (BMI) of 40.0 to 44.9 in adult [facility])  Reduce calories and increase physical activity to achieve a slow and steady weight loss to improve blood pressure, cholesterol and diabetes. Answered all patient questions. Agrees to follow plan of care and to follow up in 2 weeks, sooner if needed. Call office if unexplained blood sugars less than 70 occur or above 400. Call office or access [employer] with any further questions or concerns. Be sure to bring glucometer/food log at next appointment. Total time spent reviewing chart, labs, counseling patient and documenting on the date of the encounter: 60 min.       Electronically signed by DAWIT Green CNP on 5/18/2021 at 1:04 PM      (Please note that portions of this note were completed with a voice-recognition program. Efforts were made to edit the dictation but occasionally words are mis-transcribed.)

## 2021-05-18 NOTE — PATIENT INSTRUCTIONS
-Walk 5 min 3 times a week   -Once we get imaging back from [facility] regarding pancreatitis I will recommend medication changes.   -stop januvia     Check blood sugars 1 times per day Fasting and before meals or at least 2 hours after eating. Keep a log and bring them with you to the next appointment. Low carb diet 45 grams at each meal with two 15 gram snacks. Keep a food log and bring with you to the next appointment. Exercise: increase physical activity gradual up to 150 minutes per week. Incorporate strength and cardio.

## 2021-05-19 ENCOUNTER — TELEPHONE (OUTPATIENT)
Dept: DIABETES SERVICES | Age: 46
End: 2021-05-19

## 2021-05-19 DIAGNOSIS — Z79.4 TYPE 2 DIABETES MELLITUS WITH MICROALBUMINURIA, WITH LONG-TERM CURRENT USE OF INSULIN (HCC): Primary | ICD-10-CM

## 2021-05-19 DIAGNOSIS — R80.9 TYPE 2 DIABETES MELLITUS WITH MICROALBUMINURIA, WITH LONG-TERM CURRENT USE OF INSULIN (HCC): Primary | ICD-10-CM

## 2021-05-19 DIAGNOSIS — E11.29 TYPE 2 DIABETES MELLITUS WITH MICROALBUMINURIA, WITH LONG-TERM CURRENT USE OF INSULIN (HCC): Primary | ICD-10-CM

## 2021-05-19 NOTE — TELEPHONE ENCOUNTER
Please let patient know I did receive CT report from 10/17/2015 patient thought maybe he had an episode of pancreatitis however this report did not confirm that. Patient can continue Trulicity.

## 2021-05-20 NOTE — TELEPHONE ENCOUNTER
Attempted to call patient with no answer, vm left informing patient and to return call with any questions.

## 2021-05-26 NOTE — TELEPHONE ENCOUNTER
I would also like to look into his cortisol levels please see if he is able to do this prior to next visit. To check cortisol levels  -take dexamethasone 1 mg at between 11 pm and midnight.   -then the next day get labs done between 8-9 am       Please see where he would like dexamethasone sent also confirm he is not taking any steroids.

## 2021-06-01 RX ORDER — DEXAMETHASONE 0.5 MG/1
TABLET ORAL
Qty: 2 TABLET | Refills: 0 | Status: SHIPPED | OUTPATIENT
Start: 2021-06-01 | End: 2021-06-09

## 2021-06-01 NOTE — TELEPHONE ENCOUNTER
Spoke with patient and informed and of labs and information. Patient is agreeable and would like the medication sent to [facility], informed patient of instructions. He states he is currently not taking any steroids at this time.

## 2021-06-07 DIAGNOSIS — R21 RASH: ICD-10-CM

## 2021-06-07 DIAGNOSIS — G89.29 CHRONIC BILATERAL LOW BACK PAIN WITH RIGHT-SIDED SCIATICA: ICD-10-CM

## 2021-06-07 DIAGNOSIS — F17.200 SMOKER: ICD-10-CM

## 2021-06-07 DIAGNOSIS — R06.2 WHEEZING: ICD-10-CM

## 2021-06-07 DIAGNOSIS — M25.561 CHRONIC PAIN OF RIGHT KNEE: ICD-10-CM

## 2021-06-07 DIAGNOSIS — B35.3 TINEA PEDIS OF LEFT FOOT: ICD-10-CM

## 2021-06-07 DIAGNOSIS — G89.29 CHRONIC PAIN OF RIGHT KNEE: ICD-10-CM

## 2021-06-07 DIAGNOSIS — M54.41 CHRONIC BILATERAL LOW BACK PAIN WITH RIGHT-SIDED SCIATICA: ICD-10-CM

## 2021-06-07 DIAGNOSIS — M54.2 NECK PAIN: ICD-10-CM

## 2021-06-07 DIAGNOSIS — J45.40 MODERATE PERSISTENT ASTHMA, UNSPECIFIED WHETHER COMPLICATED: ICD-10-CM

## 2021-06-07 DIAGNOSIS — J40 BRONCHITIS: ICD-10-CM

## 2021-06-07 DIAGNOSIS — G89.29 OTHER CHRONIC PAIN: ICD-10-CM

## 2021-06-07 RX ORDER — BUDESONIDE AND FORMOTEROL FUMARATE DIHYDRATE 160; 4.5 UG/1; UG/1
AEROSOL RESPIRATORY (INHALATION)
Qty: 10.2 G | Refills: 3 | Status: SHIPPED | OUTPATIENT
Start: 2021-06-07 | End: 2021-09-29

## 2021-06-07 NOTE — TELEPHONE ENCOUNTER
Jeanette is requesting a refill on the following medication(s):  Requested Prescriptions     Pending Prescriptions Disp Refills    SYMBICORT 160-4.5 MCG/ACT AERO [Pharmacy Med Name: Symbicort 160-4. 5MCG/ACT AERO] 10.2 g      Sig: INHALE 2 PUFFS BY MOUTH TWICE A DAY       Last Visit Date (If Applicable):  8/04/9257    Next Visit Date:    6/9/2021

## 2021-06-09 ENCOUNTER — OFFICE VISIT (OUTPATIENT)
Dept: FAMILY MEDICINE CLINIC | Age: 46
End: 2021-06-09
Payer: COMMERCIAL

## 2021-06-09 VITALS
SYSTOLIC BLOOD PRESSURE: 158 MMHG | HEART RATE: 96 BPM | WEIGHT: 285 LBS | OXYGEN SATURATION: 95 % | DIASTOLIC BLOOD PRESSURE: 92 MMHG | BODY MASS INDEX: 39.75 KG/M2

## 2021-06-09 DIAGNOSIS — G89.29 CHRONIC MIDLINE LOW BACK PAIN WITHOUT SCIATICA: ICD-10-CM

## 2021-06-09 DIAGNOSIS — Z79.4 TYPE 2 DIABETES MELLITUS WITH MICROALBUMINURIA, WITH LONG-TERM CURRENT USE OF INSULIN (HCC): Primary | ICD-10-CM

## 2021-06-09 DIAGNOSIS — E11.29 TYPE 2 DIABETES MELLITUS WITH MICROALBUMINURIA, WITH LONG-TERM CURRENT USE OF INSULIN (HCC): Primary | ICD-10-CM

## 2021-06-09 DIAGNOSIS — R21 PITYRIASIS ROSEA-LIKE SKIN ERUPTION: ICD-10-CM

## 2021-06-09 DIAGNOSIS — M54.50 CHRONIC MIDLINE LOW BACK PAIN WITHOUT SCIATICA: ICD-10-CM

## 2021-06-09 DIAGNOSIS — E78.1 HYPERTRIGLYCERIDEMIA: ICD-10-CM

## 2021-06-09 DIAGNOSIS — R80.9 TYPE 2 DIABETES MELLITUS WITH MICROALBUMINURIA, WITH LONG-TERM CURRENT USE OF INSULIN (HCC): Primary | ICD-10-CM

## 2021-06-09 DIAGNOSIS — I10 ESSENTIAL HYPERTENSION: ICD-10-CM

## 2021-06-09 DIAGNOSIS — Z87.19 HISTORY OF PANCREATITIS: ICD-10-CM

## 2021-06-09 PROBLEM — R63.1 POLYDIPSIA: Status: RESOLVED | Noted: 2021-03-24 | Resolved: 2021-06-09

## 2021-06-09 PROBLEM — R35.89 POLYURIA: Status: RESOLVED | Noted: 2021-03-24 | Resolved: 2021-06-09

## 2021-06-09 LAB — HBA1C MFR BLD: 7.9 %

## 2021-06-09 PROCEDURE — 83036 HEMOGLOBIN GLYCOSYLATED A1C: CPT | Performed by: INTERNAL MEDICINE

## 2021-06-09 PROCEDURE — 3051F HG A1C>EQUAL 7.0%<8.0%: CPT | Performed by: INTERNAL MEDICINE

## 2021-06-09 PROCEDURE — G8427 DOCREV CUR MEDS BY ELIG CLIN: HCPCS | Performed by: INTERNAL MEDICINE

## 2021-06-09 PROCEDURE — 99213 OFFICE O/P EST LOW 20 MIN: CPT

## 2021-06-09 PROCEDURE — G8417 CALC BMI ABV UP PARAM F/U: HCPCS | Performed by: INTERNAL MEDICINE

## 2021-06-09 PROCEDURE — 2022F DILAT RTA XM EVC RTNOPTHY: CPT | Performed by: INTERNAL MEDICINE

## 2021-06-09 PROCEDURE — 99214 OFFICE O/P EST MOD 30 MIN: CPT | Performed by: INTERNAL MEDICINE

## 2021-06-09 PROCEDURE — 4004F PT TOBACCO SCREEN RCVD TLK: CPT | Performed by: INTERNAL MEDICINE

## 2021-06-09 RX ORDER — MEDICAL SUPPLY, MISCELLANEOUS
1 EACH MISCELLANEOUS 2 TIMES DAILY
Qty: 1 EACH | Refills: 0 | Status: SHIPPED | OUTPATIENT
Start: 2021-06-09

## 2021-06-09 NOTE — PROGRESS NOTES
[address]  [address]  Dept: [phone]  Dept Fax: [phone]: [phone]     Visit Date:  6/9/2021    Patient:  Jocelyn Lopez  YOB: 1975    HPI:   Jocelyn Lopez presents today for   Chief Complaint   Patient presents with    1 Month Follow-Up     patient is here today for a one month follow up and is doing well. Fredrick Wyman HPI 41year old male with a history of anxiety/borderline personality problems, bipolar disorder, morbid obesity BMI 38,HTN, Asthma/COPD 30ppd smoking, arthritis/chronic low back pain, knee pain,ringworm, heroin/cocaine/opiod abuse/addiction  is coming in for 4 week follow up. HTN-was 158/92. Patient has been using naproxen regularly on continuous basis for his chronic low back pain that seems to be probably aggravating his blood pressure including the use of NSAIDs as well as chronic pain. He has not started using Toprol-XL something I recommended would also help. He is also compliant with his losartan 100 mg and hydrochlorothiazide 25 mg as prescribed. Diabetes mellitus type 2-A1C was 7.9 which is a big drop compared to his prior A1c over 13.3. He has been compliant with his insulin denies any low blood sugar attacks or sugars going below 100. He brought in his blood sugar logs and that has ranged anywhere from 100s to 180s for fasting and 150s to 200s for nonfasting sugars. He has been doing fairly good feels a lot better denies any polyuria polydipsia or neuropathy-like symptoms in his feet. Annular rash with central clearing-on his arms and dorsal aspect of his foot nonresponsive to multiple rounds of oral antifungal as well as topical antifungal creams. Looks mostly like a pityriasis rosea like rash. Medications  Prior to Visit Medications    Medication Sig Taking?  Authorizing Provider   SYMBICORT 160-4.5 MCG/ACT AERO INHALE 2 PUFFS BY MOUTH TWICE A DAY Yes Korina Dunbar MD   naproxen (NAPROSYN) 500 MG tablet Take 1 tablet by mouth 2 times daily as needed for Pain Yes Korina Dunbar MD   potassium chloride (KLOR-CON M) 20 MEQ extended release tablet TAKE 1 TABLET BY MOUTH DAILY Yes Korina Dunbar MD   ULTICARE SHORT PEN NEEDLES 31G X 8 MM MISC  Yes Historical Provider, MD   hydrOXYzine (ATARAX) 25 MG tablet  Yes Historical Provider, MD   insulin glargine (LANTUS SOLOSTAR) 100 UNIT/ML injection pen Inject 25 Units into the skin 2 times daily Yes Korina Dunbra MD   Dulaglutide (TRULICITY) 1.5 HP/8.2JB SOPN Inject 1.5 mg into the skin once a week Yes Korina Dunbar MD   metoprolol succinate (TOPROL XL) 50 MG extended release tablet Take 1 tablet by mouth daily Yes Korina Dunbar MD   hydroCHLOROthiazide (HYDRODIURIL) 25 MG tablet Take 1 tablet by mouth every morning Yes Korina Dunbar MD   losartan (COZAAR) 100 MG tablet Take 1 tablet by mouth daily Yes Korina Dunbar MD   metFORMIN (GLUCOPHAGE) 1000 MG tablet Take 1 tablet by mouth 2 times daily (with meals) Yes Korina Dunbar MD   Vitamin D, Cholecalciferol, 50 MCG (2000 UT) CAPS Take 2,000 Units by mouth daily Yes Korina Dunbar MD   blood glucose monitor kit and supplies Dispense all needed supplies to include: monitor, strips, lancing device, lancets, control solutions, alcohol swabs. Yes Korina Dunbar MD   Lancets MISC 1 each by Does not apply route 3 times daily Yes Korina Dunbar MD   blood glucose monitor strips Test 3 times a day & as needed for symptoms of irregular blood glucose. Dispense sufficient amount for indicated testing frequency.  Yes Korina Dunbar MD   albuterol sulfate  (90 Base) MCG/ACT inhaler Inhale 2 puffs into the lungs every 4 hours as needed for Wheezing or Shortness of Breath (cough) Yes Korina Dunbar MD   OXcarbazepine (TRILEPTAL) 300 MG tablet Take 1 tablet by mouth daily Yes Historical Provider, MD   prazosin (MINIPRESS) 2 MG capsule Take 2 capsules by mouth daily Yes Historical Provider, MD   dexamethasone (DECADRON) 0.5 MG tablet Take 2 tablets between 11 pm and 12 am.  Patient not taking: Reported on 6/9/2021  DAWIT Anderson CNP   glucose 4 g chewable tablet Take 4 tablets by mouth as needed for Low blood sugar  Zoya Osorio MD   atorvastatin (LIPITOR) 20 MG tablet Take 1 tablet by mouth daily  Patient not taking: Reported on 6/9/2021  Zoya Osorio MD   ezetimibe (ZETIA) 10 MG tablet Take 1 tablet by mouth daily  Patient not taking: Reported on 6/9/2021  Zoya Osorio MD   diclofenac (VOLTAREN) 75 MG EC tablet Take 1 tablet by mouth 2 times daily for 14 days  Shyanne Guaman MD   hydrOXYzine (VISTARIL) 50 MG capsule take 1 capsule by mouth at bedtime  Historical Provider, MD        Allergies:  is allergic to amoxicillin, other, and pcn [penicillins]. Past Medical History:   has a past medical history of Bipolar 1 disorder ([facility] [address] History of heroin abuse ([facility] [address] Hypertension, and Personality disorder ([facility] [address] Past Surgical History   has a past surgical history that includes knee surgery (Left) and Finger surgery (Left). Family History  family history includes Cancer in his father; Diabetes in his mother; Heart Disease in his father; High Blood Pressure in his father and mother; Kidney Disease in his mother. Social History   reports that he has been smoking cigarettes. He uses smokeless tobacco. He reports previous alcohol use.     Health Maintenance:    Health Maintenance   Topic Date Due    Hepatitis C screen  Never done    Pneumococcal 0-64 years Vaccine (1 of 2 - PPSV23) Never done    Diabetic foot exam  Never done    Diabetic retinal exam  Never done    COVID-19 Vaccine (1) Never done    HIV screen  Never done    Hepatitis B vaccine (1 of 3 - Risk 3-dose series) Never done    DTaP/Tdap/Td vaccine (1 - Tdap) Never done    Flu vaccine (Season Ended) 09/01/2021    Lipid screen  03/22/2022    Potassium monitoring  03/22/2022    Creatinine monitoring  03/22/2022    A1C test (Diabetic or Prediabetic)  06/09/2022    Hepatitis A vaccine  Aged Out    Hib vaccine  Aged Out    Meningococcal (ACWY) vaccine  Aged Out       Subjective:      Review of Systems   Constitutional: Negative for fatigue, fever and unexpected weight change. HENT: Negative for ear pain, postnasal drip, rhinorrhea, sinus pain, sore throat and trouble swallowing. Eyes: Negative for visual disturbance. Respiratory: Negative for cough, chest tightness and shortness of breath. Cardiovascular: Negative for chest pain and leg swelling. Gastrointestinal: Negative for abdominal pain, blood in stool and diarrhea. Endocrine: Negative for polyuria. Genitourinary: Negative for difficulty urinating and flank pain. Musculoskeletal: Negative for arthralgias, joint swelling and myalgias. Skin: Positive for rash. Allergic/Immunologic: Negative for environmental allergies. Neurological: Negative for weakness, light-headedness, numbness and headaches. Hematological: Negative for adenopathy. Psychiatric/Behavioral: Negative for behavioral problems and suicidal ideas. The patient is not nervous/anxious. Objective:     BP (!) 158/92 (Site: Right Upper Arm, Position: Sitting)   Pulse 96   Wt 285 lb (129.3 kg)   SpO2 95%   BMI 39.75 kg/m²     Physical Exam  Vitals and nursing note reviewed. HENT:      Head: Normocephalic and atraumatic. Cardiovascular:      Rate and Rhythm: Normal rate and regular rhythm. Pulses: Normal pulses. Heart sounds: Normal heart sounds. No murmur heard. No friction rub. No gallop. Pulmonary:      Effort: Pulmonary effort is normal.      Breath sounds: Normal breath sounds. No stridor. Abdominal:      General: Abdomen is flat. Bowel sounds are normal.      Palpations: Abdomen is soft. Skin:     General: Skin is warm. Findings: Rash present.       Comments: Large oval circular erythematous patch with central clearing. Neurological:      Mental Status: He is oriented to person, place, and time. Mental status is at baseline. Psychiatric:         Mood and Affect: Mood normal.             Assessment       Diagnosis Orders   1. Type 2 diabetes mellitus with microalbuminuria, with long-term current use of insulin (HCC)  External Referral To Dermatology    Blood Pressure Monitoring (B-D ASSURE BPM/MANUAL ARM CUFF) MISC    POCT Hb A1C (glycosylated hemoglobin)   2. Essential hypertension  External Referral To Dermatology    Blood Pressure Monitoring (B-D ASSURE BPM/MANUAL ARM CUFF) MISC   3. Hypertriglyceridemia  External Referral To Dermatology    Blood Pressure Monitoring (B-D ASSURE BPM/MANUAL ARM CUFF) MISC   4. Chronic midline low back pain without sciatica  External Referral To Dermatology    Blood Pressure Monitoring (B-D ASSURE BPM/MANUAL ARM CUFF) MISC   5. History of pancreatitis  External Referral To Dermatology    Blood Pressure Monitoring (B-D ASSURE BPM/MANUAL ARM CUFF) MISC   6. Pityriasis rosea-like skin eruption  External Referral To Dermatology    Blood Pressure Monitoring (B-D ASSURE BPM/MANUAL ARM CUFF) MISC         PLAN   Doing good with DM/continue with weight loss/low glycemic index foods  BP machine prescription placed  Derm referral for non responsive rash to multiple rounds of antifungal creams/tabs. 4 week follow up    Orders Placed This Encounter   Procedures    External Referral To Dermatology     Referral Priority:   Routine     Referral Type:   Eval and Treat     Referral Reason:   Specialty Services Required     Requested Specialty:   Dermatology     Number of Visits Requested:   1    POCT Hb A1C (glycosylated hemoglobin)        No follow-ups on file. Patient given educational materials - see patient instructions. Discussed use, benefit, and side effects of prescribed medications. All patient questions answered. Pt voiced understanding. Reviewed health maintenance.        Electronically signed Kymberly Hearn MD on 6/9/2021 at 1:16 PM EDT

## 2021-06-10 NOTE — TELEPHONE ENCOUNTER
Jeanette is requesting a refill on the following medication(s):  Requested Prescriptions     Pending Prescriptions Disp Refills    ULTICARE SHORT PEN NEEDLES 31G X 8 MM [facility] [Pharmacy Med Name: UltiCare Short Pen Needles 31G X 8 MM MISC] 100 each      Sig: USE WITH LANTUS NIGHTLY       Last Visit Date (If Applicable):  6/3/0634    Next Visit Date:    6/23/2021

## 2021-06-14 ASSESSMENT — ENCOUNTER SYMPTOMS
SHORTNESS OF BREATH: 0
COUGH: 0
SORE THROAT: 0
CHEST TIGHTNESS: 0
ABDOMINAL PAIN: 0
SINUS PAIN: 0
BLOOD IN STOOL: 0
TROUBLE SWALLOWING: 0
DIARRHEA: 0
RHINORRHEA: 0

## 2021-06-15 ENCOUNTER — OFFICE VISIT (OUTPATIENT)
Dept: DIABETES SERVICES | Age: 46
End: 2021-06-15
Payer: COMMERCIAL

## 2021-06-15 VITALS
BODY MASS INDEX: 40.71 KG/M2 | HEIGHT: 71 IN | RESPIRATION RATE: 18 BRPM | SYSTOLIC BLOOD PRESSURE: 120 MMHG | DIASTOLIC BLOOD PRESSURE: 81 MMHG | WEIGHT: 290.8 LBS | HEART RATE: 76 BPM

## 2021-06-15 DIAGNOSIS — E11.29 TYPE 2 DIABETES MELLITUS WITH MICROALBUMINURIA, WITH LONG-TERM CURRENT USE OF INSULIN (HCC): Primary | ICD-10-CM

## 2021-06-15 DIAGNOSIS — Z79.4 TYPE 2 DIABETES MELLITUS WITH MICROALBUMINURIA, WITH LONG-TERM CURRENT USE OF INSULIN (HCC): Primary | ICD-10-CM

## 2021-06-15 DIAGNOSIS — I10 ESSENTIAL HYPERTENSION: ICD-10-CM

## 2021-06-15 DIAGNOSIS — E66.01 CLASS 3 SEVERE OBESITY DUE TO EXCESS CALORIES WITH SERIOUS COMORBIDITY AND BODY MASS INDEX (BMI) OF 40.0 TO 44.9 IN ADULT (HCC): ICD-10-CM

## 2021-06-15 DIAGNOSIS — R22.32 ARM MASS, LEFT: ICD-10-CM

## 2021-06-15 DIAGNOSIS — R80.9 TYPE 2 DIABETES MELLITUS WITH MICROALBUMINURIA, WITH LONG-TERM CURRENT USE OF INSULIN (HCC): Primary | ICD-10-CM

## 2021-06-15 DIAGNOSIS — E78.1 HYPERTRIGLYCERIDEMIA: ICD-10-CM

## 2021-06-15 DIAGNOSIS — D35.02 ADRENAL ADENOMA, LEFT: ICD-10-CM

## 2021-06-15 PROCEDURE — 4004F PT TOBACCO SCREEN RCVD TLK: CPT | Performed by: NURSE PRACTITIONER

## 2021-06-15 PROCEDURE — G8427 DOCREV CUR MEDS BY ELIG CLIN: HCPCS | Performed by: NURSE PRACTITIONER

## 2021-06-15 PROCEDURE — 99214 OFFICE O/P EST MOD 30 MIN: CPT | Performed by: NURSE PRACTITIONER

## 2021-06-15 PROCEDURE — G8417 CALC BMI ABV UP PARAM F/U: HCPCS | Performed by: NURSE PRACTITIONER

## 2021-06-15 PROCEDURE — 2022F DILAT RTA XM EVC RTNOPTHY: CPT | Performed by: NURSE PRACTITIONER

## 2021-06-15 PROCEDURE — 3051F HG A1C>EQUAL 7.0%<8.0%: CPT | Performed by: NURSE PRACTITIONER

## 2021-06-15 ASSESSMENT — ENCOUNTER SYMPTOMS
ABDOMINAL PAIN: 0
DIARRHEA: 0
SHORTNESS OF BREATH: 0
RESPIRATORY NEGATIVE: 1

## 2021-06-15 NOTE — PATIENT INSTRUCTIONS
Ultrasound for lump on arm    To check cortisol levels  -take dexamethasone 1 mg at between 11 pm and midnight.   -then the next day get labs done between 8-9 am     Document food and blood sugars.

## 2021-06-15 NOTE — PROGRESS NOTES
Blood Pressure Mother     Diabetes Mother     Kidney Disease Mother     Heart Disease Father     High Blood Pressure Father     Cancer Father      Social History     Tobacco Use    Smoking status: Current Every Day Smoker     Types: Cigarettes    Smokeless tobacco: Current User    Tobacco comment: one pack a day   Vaping Use    Vaping Use: Never used   Substance Use Topics    Alcohol use: Not Currently    Drug use: Not on file     Comment: 127 days sober     Allergies   Allergen Reactions    Amoxicillin     Other      Moral mushrooms    Pcn [Penicillins]      Thinks pcn        MEDICATIONS:  Current Outpatient Medications   Medication Sig Dispense Refill    ULTICARE SHORT PEN NEEDLES 31G X 8 MM MISC USE WITH LANTUS NIGHTLY 100 each 3    Blood Pressure Monitoring (B-D ASSURE BPM/MANUAL ARM CUFF) MISC 1 applicator by Does not apply route 2 times daily Check BP twice daily 1 each 0    SYMBICORT 160-4.5 MCG/ACT AERO INHALE 2 PUFFS BY MOUTH TWICE A DAY 10.2 g 3    glucose 4 g chewable tablet Take 4 tablets by mouth as needed for Low blood sugar 60 tablet 3    naproxen (NAPROSYN) 500 MG tablet Take 1 tablet by mouth 2 times daily as needed for Pain 180 tablet 3    potassium chloride (KLOR-CON M) 20 MEQ extended release tablet TAKE 1 TABLET BY MOUTH DAILY 30 tablet 1    hydrOXYzine (ATARAX) 25 MG tablet       insulin glargine (LANTUS SOLOSTAR) 100 UNIT/ML injection pen Inject 25 Units into the skin 2 times daily 5 pen 3    Dulaglutide (TRULICITY) 1.5 BJ/6.2ZE SOPN Inject 1.5 mg into the skin once a week 5 pen 3    metoprolol succinate (TOPROL XL) 50 MG extended release tablet Take 1 tablet by mouth daily 90 tablet 1    atorvastatin (LIPITOR) 20 MG tablet Take 1 tablet by mouth daily 30 tablet 3    ezetimibe (ZETIA) 10 MG tablet Take 1 tablet by mouth daily 30 tablet 3    hydroCHLOROthiazide (HYDRODIURIL) 25 MG tablet Take 1 tablet by mouth every morning 30 tablet 3    losartan (COZAAR) 100 MG tablet Take 1 tablet by mouth daily 30 tablet 3    metFORMIN (GLUCOPHAGE) 1000 MG tablet Take 1 tablet by mouth 2 times daily (with meals) 180 tablet 1    Vitamin D, Cholecalciferol, 50 MCG (2000 UT) CAPS Take 2,000 Units by mouth daily 30 capsule 3    blood glucose monitor kit and supplies Dispense all needed supplies to include: monitor, strips, lancing device, lancets, control solutions, alcohol swabs. 1 kit 0    Lancets MISC 1 each by Does not apply route 3 times daily 300 each 3    blood glucose monitor strips Test 3 times a day & as needed for symptoms of irregular blood glucose. Dispense sufficient amount for indicated testing frequency. 300 strip 3    albuterol sulfate  (90 Base) MCG/ACT inhaler Inhale 2 puffs into the lungs every 4 hours as needed for Wheezing or Shortness of Breath (cough) 1 Inhaler 3    hydrOXYzine (VISTARIL) 50 MG capsule take 1 capsule by mouth at bedtime      OXcarbazepine (TRILEPTAL) 300 MG tablet Take 1 tablet by mouth daily      prazosin (MINIPRESS) 2 MG capsule Take 2 capsules by mouth daily       No current facility-administered medications for this visit. Review ofSymptoms:  Review of Systems   Constitutional: Positive for fatigue. Negative for unexpected weight change. Eyes: Negative for visual disturbance. Respiratory: Negative. Negative for shortness of breath. Cardiovascular: Negative for chest pain and leg swelling. Gastrointestinal: Negative for abdominal pain and diarrhea. Endocrine: Negative for polydipsia, polyphagia and polyuria. Genitourinary: Negative. Musculoskeletal: Negative. Skin: Negative for rash and wound. Neurological: Negative for dizziness, tremors, seizures and headaches. Psychiatric/Behavioral: Negative. Negative for confusion and decreased concentration. Theremainder of a complete 14-point review of systems is negative.        Vital Signs: /81 (Site: Left Upper Arm, Position: Sitting, Cuff Size: Medium Adult)   Pulse 76   Resp 18   Ht 5' 11\" (1.803 m)   Wt 290 lb 12.8 oz (131.9 kg)   BMI 40.56 kg/m²      Wt Readings from Last 3 Encounters:   06/15/21 290 lb 12.8 oz (131.9 kg)   06/09/21 285 lb (129.3 kg)   05/18/21 288 lb 9.6 oz (130.9 kg)     Body mass index is 40.56 kg/m². LABS:  Hemoglobin A1C   Date Value Ref Range Status   06/09/2021 7.9 % Final     No results found for: Lizzeth Led  Lab Results   Component Value Date     02/12/2019    K 4.7 02/12/2019    CL 99 02/12/2019    CO2 27 02/12/2019    BUN 9 02/12/2019    CREATININE 0.94 02/12/2019    GLUCOSE 102 (H) 02/12/2019    CALCIUM 9.8 02/12/2019    PROT 7.6 02/12/2019    LABALBU 4.5 02/12/2019    BILITOT 0.49 02/12/2019    ALKPHOS 86 02/12/2019    AST 35 02/12/2019    ALT 36 02/12/2019    LABGLOM >60 02/12/2019    GFRAA >60 02/12/2019     Lab Results   Component Value Date    CHOL 174 02/12/2019     Lab Results   Component Value Date    TRIG 147 02/12/2019     Lab Results   Component Value Date    HDL 44 02/12/2019     Lab Results   Component Value Date    LDLCHOLESTEROL 101 02/12/2019     Lab Results   Component Value Date    VLDL NOT REPORTED 02/12/2019     Lab Results   Component Value Date    CHOLHDLRATIO 4.0 02/12/2019           Physical Exam  Constitutional:       Appearance: He is well-developed. Eyes:      Pupils: Pupils are equal, round, and reactive to light. Cardiovascular:      Rate and Rhythm: Normal rate and regular rhythm. Pulmonary:      Effort: Pulmonary effort is normal.      Breath sounds: Normal breath sounds. Skin:     General: Skin is warm and dry. Findings: No lesion (no lipohypertrophy) or rash. Neurological:      Mental Status: He is alert and oriented to person, place, and time. Sensory: No sensory deficit. Psychiatric:         Speech: Speech normal.         Behavior: Behavior normal.         Thought Content:  Thought content normal.         Judgment: Judgment normal. ASSESSMENT/PLAN:     Diagnosis Orders   1. Type 2 diabetes mellitus with microalbuminuria, with long-term current use of insulin ([facility] [address]     2. Hypertriglyceridemia     3. Essential hypertension     4. BMI 40.0-44.9, adult ([facility])     5. Class 3 severe obesity due to excess calories with serious comorbidity and body mass index (BMI) of 40.0 to 44.9 in adult ([address]     6. Adrenal adenoma, left     7. Arm mass, left  US EXTREMITY LEFT NON VASC LIMITED     Orders Placed This Encounter   Procedures    US EXTREMITY LEFT NON VASC LIMITED     No orders of the defined types were placed in this encounter. Requested Prescriptions      No prescriptions requested or ordered in this encounter       1. Type 2 diabetes mellitus with microalbuminuria, with long-term current use of insulin ([facility])  - Unstable  HbA1C goal is less than 7%. - Fasting blood glucose goal is 70-130mg/dl and postprandial blood sugar goal is less than 180 mg/dl. -Diabetic foot exam up-to-date: No  -Diabetic retinal exam up-to-date: No  - Labs reviewed includes: Most recent A1C 7.9%, and GFR >60 mL/min. Repeat labs due in 3 months.    -We discussed in great detail dietary modifications they can make to better improve their blood sugars. -follow up diabetes education completed, all questions answered.  -bs are improving no med changes at this time. He does not have a history of pancreatitis so we can continue glp-1 will increase if needed. Discussed signs and symptoms of hyper/hypoglycemia and how to treat. Encouraged 150 minutes of physical activity per week. Follow a low carbohydrate diet. Encouraged at least 7 hours of sleep. The patient was informed of the goals of diabetes management. This can only be accomplished by watching their diet and exercise levels. We certainly use medicines to help attain these goals. The consequences of not controlling blood sugars were discussed.  These include blindness, heart disease, stroke, kidney disease, and possibly need for dialysis. They were told to be careful with their foot care as diabetics often have nerve damage, infections and risk for limb amutations . They also need a dilated eye exam yearly. We discussed the issues of diet, exercise, medication, complication avoidance, reviewed the signs and symptoms of diabetes, hypoglycemic episodes, significance of HbA1C.         2. Hypertriglyceridemia  stable, lipid panel reviewed, continue current medications. Diet and exercise      3. Essential hypertension   stable, continue current medications. Diet and exercise Seek emergent care if chest pain develops. 4. BMI 40.0-44.9, adult ([facility])  5. Class 3 severe obesity due to excess calories with serious comorbidity and body mass index (BMI) of 40.0 to 44.9 in adult [facility])  Reduce calories and increase physical activity to achieve a slow and steady weight loss to improve blood pressure, cholesterol and diabetes. 6. Adrenal adenoma, left  -await dst results    7. Arm mass, left  -no concern for clot, suspicious for lipoma, us to confirm. Answered all patient questions. Agrees to follow plan of care and to follow up in 1 months, sooner if needed. Call office if unexplained blood sugars less than 70 occur or above 400. Call office or access [employer] with any further questions or concerns. Be sure to bring glucometer/food log at next appointment. Total time spent reviewing chart, labs, counseling patient and documenting on the date of the encounter: 30 min.       Electronically signed by DAWIT Tipton CNP on 6/15/2021 at 12:55 PM      (Please note that portions of this note were completed with a voice-recognition program. Efforts were made to edit the dictation but occasionally words are mis-transcribed.)

## 2021-06-21 DIAGNOSIS — J40 BRONCHITIS: ICD-10-CM

## 2021-06-21 DIAGNOSIS — M25.561 CHRONIC PAIN OF RIGHT KNEE: ICD-10-CM

## 2021-06-21 DIAGNOSIS — B35.3 TINEA PEDIS OF LEFT FOOT: ICD-10-CM

## 2021-06-21 DIAGNOSIS — E55.9 VITAMIN D DEFICIENCY: ICD-10-CM

## 2021-06-21 DIAGNOSIS — J45.40 MODERATE PERSISTENT ASTHMA, UNSPECIFIED WHETHER COMPLICATED: ICD-10-CM

## 2021-06-21 DIAGNOSIS — R21 RASH: ICD-10-CM

## 2021-06-21 DIAGNOSIS — G89.29 OTHER CHRONIC PAIN: ICD-10-CM

## 2021-06-21 DIAGNOSIS — F17.200 SMOKER: ICD-10-CM

## 2021-06-21 DIAGNOSIS — Z79.4 TYPE 2 DIABETES MELLITUS WITH MICROALBUMINURIA, WITH LONG-TERM CURRENT USE OF INSULIN (HCC): ICD-10-CM

## 2021-06-21 DIAGNOSIS — B35.9 RINGWORM: ICD-10-CM

## 2021-06-21 DIAGNOSIS — E11.29 TYPE 2 DIABETES MELLITUS WITH MICROALBUMINURIA, WITH LONG-TERM CURRENT USE OF INSULIN (HCC): ICD-10-CM

## 2021-06-21 DIAGNOSIS — R63.1 POLYDIPSIA: ICD-10-CM

## 2021-06-21 DIAGNOSIS — E87.6 HYPOKALEMIA: ICD-10-CM

## 2021-06-21 DIAGNOSIS — M54.41 CHRONIC BILATERAL LOW BACK PAIN WITH RIGHT-SIDED SCIATICA: ICD-10-CM

## 2021-06-21 DIAGNOSIS — G89.29 CHRONIC BILATERAL LOW BACK PAIN WITH RIGHT-SIDED SCIATICA: ICD-10-CM

## 2021-06-21 DIAGNOSIS — I10 ESSENTIAL HYPERTENSION: ICD-10-CM

## 2021-06-21 DIAGNOSIS — G89.29 CHRONIC PAIN OF RIGHT KNEE: ICD-10-CM

## 2021-06-21 DIAGNOSIS — M54.2 NECK PAIN: ICD-10-CM

## 2021-06-21 DIAGNOSIS — L60.0 INGROWING NAIL, RIGHT GREAT TOE: ICD-10-CM

## 2021-06-21 DIAGNOSIS — R35.89 POLYURIA: ICD-10-CM

## 2021-06-21 DIAGNOSIS — F14.10 COCAINE ABUSE (HCC): ICD-10-CM

## 2021-06-21 DIAGNOSIS — R06.2 WHEEZING: ICD-10-CM

## 2021-06-21 DIAGNOSIS — E78.1 HYPERTRIGLYCERIDEMIA: ICD-10-CM

## 2021-06-21 DIAGNOSIS — E78.2 MIXED HYPERLIPIDEMIA: ICD-10-CM

## 2021-06-21 DIAGNOSIS — F11.21 OPIOID DEPENDENCE IN REMISSION (HCC): ICD-10-CM

## 2021-06-21 DIAGNOSIS — R80.9 TYPE 2 DIABETES MELLITUS WITH MICROALBUMINURIA, WITH LONG-TERM CURRENT USE OF INSULIN (HCC): ICD-10-CM

## 2021-06-21 RX ORDER — POTASSIUM CHLORIDE 20 MEQ/1
20 TABLET, EXTENDED RELEASE ORAL DAILY
Qty: 30 TABLET | Refills: 1 | Status: SHIPPED | OUTPATIENT
Start: 2021-06-21 | End: 2021-09-01

## 2021-06-21 RX ORDER — METOPROLOL SUCCINATE 50 MG/1
50 TABLET, EXTENDED RELEASE ORAL DAILY
Qty: 90 TABLET | Refills: 1 | Status: SHIPPED | OUTPATIENT
Start: 2021-06-21 | End: 2022-01-25

## 2021-06-23 LAB
COLLECTION INFORMATION: ABNORMAL
CORTISOL: 0.9 UG/DL (ref 2.7–18.4)

## 2021-07-12 DIAGNOSIS — G89.29 OTHER CHRONIC PAIN: ICD-10-CM

## 2021-07-12 DIAGNOSIS — B35.3 TINEA PEDIS OF LEFT FOOT: ICD-10-CM

## 2021-07-12 DIAGNOSIS — G89.29 CHRONIC PAIN OF RIGHT KNEE: ICD-10-CM

## 2021-07-12 DIAGNOSIS — E78.1 HYPERTRIGLYCERIDEMIA: ICD-10-CM

## 2021-07-12 DIAGNOSIS — R35.89 POLYURIA: ICD-10-CM

## 2021-07-12 DIAGNOSIS — Z79.4 TYPE 2 DIABETES MELLITUS WITH MICROALBUMINURIA, WITH LONG-TERM CURRENT USE OF INSULIN (HCC): ICD-10-CM

## 2021-07-12 DIAGNOSIS — B35.9 RINGWORM: ICD-10-CM

## 2021-07-12 DIAGNOSIS — R21 RASH: ICD-10-CM

## 2021-07-12 DIAGNOSIS — E55.9 VITAMIN D DEFICIENCY: ICD-10-CM

## 2021-07-12 DIAGNOSIS — M54.2 NECK PAIN: ICD-10-CM

## 2021-07-12 DIAGNOSIS — R06.2 WHEEZING: ICD-10-CM

## 2021-07-12 DIAGNOSIS — F17.200 SMOKER: ICD-10-CM

## 2021-07-12 DIAGNOSIS — R63.1 POLYDIPSIA: ICD-10-CM

## 2021-07-12 DIAGNOSIS — M25.561 CHRONIC PAIN OF RIGHT KNEE: ICD-10-CM

## 2021-07-12 DIAGNOSIS — F11.21 OPIOID DEPENDENCE IN REMISSION (HCC): ICD-10-CM

## 2021-07-12 DIAGNOSIS — J45.40 MODERATE PERSISTENT ASTHMA, UNSPECIFIED WHETHER COMPLICATED: ICD-10-CM

## 2021-07-12 DIAGNOSIS — M54.41 CHRONIC BILATERAL LOW BACK PAIN WITH RIGHT-SIDED SCIATICA: ICD-10-CM

## 2021-07-12 DIAGNOSIS — J40 BRONCHITIS: ICD-10-CM

## 2021-07-12 DIAGNOSIS — F14.10 COCAINE ABUSE (HCC): ICD-10-CM

## 2021-07-12 DIAGNOSIS — G89.29 CHRONIC BILATERAL LOW BACK PAIN WITH RIGHT-SIDED SCIATICA: ICD-10-CM

## 2021-07-12 DIAGNOSIS — R80.9 TYPE 2 DIABETES MELLITUS WITH MICROALBUMINURIA, WITH LONG-TERM CURRENT USE OF INSULIN (HCC): ICD-10-CM

## 2021-07-12 DIAGNOSIS — I10 ESSENTIAL HYPERTENSION: ICD-10-CM

## 2021-07-12 DIAGNOSIS — L60.0 INGROWING NAIL, RIGHT GREAT TOE: ICD-10-CM

## 2021-07-12 DIAGNOSIS — E11.29 TYPE 2 DIABETES MELLITUS WITH MICROALBUMINURIA, WITH LONG-TERM CURRENT USE OF INSULIN (HCC): ICD-10-CM

## 2021-07-12 DIAGNOSIS — E87.6 HYPOKALEMIA: ICD-10-CM

## 2021-07-12 RX ORDER — CHOLECALCIFEROL (VITAMIN D3) 125 MCG
CAPSULE ORAL
Qty: 30 TABLET | Refills: 1 | Status: SHIPPED | OUTPATIENT
Start: 2021-07-12 | End: 2021-10-26

## 2021-07-12 RX ORDER — LOSARTAN POTASSIUM 100 MG/1
100 TABLET ORAL DAILY
Qty: 30 TABLET | Refills: 3 | Status: SHIPPED | OUTPATIENT
Start: 2021-07-12 | End: 2021-07-29 | Stop reason: SDUPTHER

## 2021-07-12 RX ORDER — HYDROCHLOROTHIAZIDE 25 MG/1
25 TABLET ORAL EVERY MORNING
Qty: 30 TABLET | Refills: 3 | Status: SHIPPED | OUTPATIENT
Start: 2021-07-12 | End: 2021-07-29 | Stop reason: SDUPTHER

## 2021-07-12 NOTE — TELEPHONE ENCOUNTER
Maria De Jesus Gonzalez is requesting a refill on the following medication(s):  Requested Prescriptions     Pending Prescriptions Disp Refills    losartan (COZAAR) 100 MG tablet [Pharmacy Med Name: Losartan Potassium 100MG TABS] 30 tablet 3     Sig: TAKE 1 TABLET BY MOUTH DAILY    hydroCHLOROthiazide (HYDRODIURIL) 25 MG tablet [Pharmacy Med Name: hydroCHLOROthiazide 25MG TABS] 30 tablet 3     Sig: TAKE 1 TABLET BY MOUTH EVERY MORNING    Cholecalciferol (VITAMIN D3) 50 MCG (2000 UT) TABS [Pharmacy Med Name: Vitamin D3 50 MCG(2000 UT) TABS] 30 tablet      Sig: TAKE 1 TABLET BY MOUTH DAILY       Last Visit Date (If Applicable):  8/3/9084    Next Visit Date:    Visit date not found

## 2021-07-20 DIAGNOSIS — E11.29 TYPE 2 DIABETES MELLITUS WITH MICROALBUMINURIA, WITH LONG-TERM CURRENT USE OF INSULIN (HCC): ICD-10-CM

## 2021-07-20 DIAGNOSIS — F17.200 SMOKER: ICD-10-CM

## 2021-07-20 DIAGNOSIS — M54.41 CHRONIC BILATERAL LOW BACK PAIN WITH RIGHT-SIDED SCIATICA: ICD-10-CM

## 2021-07-20 DIAGNOSIS — E87.6 HYPOKALEMIA: ICD-10-CM

## 2021-07-20 DIAGNOSIS — Z79.4 TYPE 2 DIABETES MELLITUS WITH MICROALBUMINURIA, WITH LONG-TERM CURRENT USE OF INSULIN (HCC): ICD-10-CM

## 2021-07-20 DIAGNOSIS — I10 ESSENTIAL HYPERTENSION: ICD-10-CM

## 2021-07-20 DIAGNOSIS — R06.2 WHEEZING: ICD-10-CM

## 2021-07-20 DIAGNOSIS — E55.9 VITAMIN D DEFICIENCY: ICD-10-CM

## 2021-07-20 DIAGNOSIS — M54.2 NECK PAIN: ICD-10-CM

## 2021-07-20 DIAGNOSIS — B35.3 TINEA PEDIS OF LEFT FOOT: ICD-10-CM

## 2021-07-20 DIAGNOSIS — R80.9 TYPE 2 DIABETES MELLITUS WITH MICROALBUMINURIA, WITH LONG-TERM CURRENT USE OF INSULIN (HCC): ICD-10-CM

## 2021-07-20 DIAGNOSIS — M25.561 CHRONIC PAIN OF RIGHT KNEE: ICD-10-CM

## 2021-07-20 DIAGNOSIS — G89.29 CHRONIC PAIN OF RIGHT KNEE: ICD-10-CM

## 2021-07-20 DIAGNOSIS — B35.9 RINGWORM: ICD-10-CM

## 2021-07-20 DIAGNOSIS — R63.1 POLYDIPSIA: ICD-10-CM

## 2021-07-20 DIAGNOSIS — E78.2 MIXED HYPERLIPIDEMIA: ICD-10-CM

## 2021-07-20 DIAGNOSIS — R21 RASH: ICD-10-CM

## 2021-07-20 DIAGNOSIS — F14.10 COCAINE ABUSE (HCC): ICD-10-CM

## 2021-07-20 DIAGNOSIS — F11.21 OPIOID DEPENDENCE IN REMISSION (HCC): ICD-10-CM

## 2021-07-20 DIAGNOSIS — J45.40 MODERATE PERSISTENT ASTHMA, UNSPECIFIED WHETHER COMPLICATED: ICD-10-CM

## 2021-07-20 DIAGNOSIS — G89.29 CHRONIC BILATERAL LOW BACK PAIN WITH RIGHT-SIDED SCIATICA: ICD-10-CM

## 2021-07-20 DIAGNOSIS — E78.1 HYPERTRIGLYCERIDEMIA: ICD-10-CM

## 2021-07-20 DIAGNOSIS — L60.0 INGROWING NAIL, RIGHT GREAT TOE: ICD-10-CM

## 2021-07-20 DIAGNOSIS — R35.89 POLYURIA: ICD-10-CM

## 2021-07-20 DIAGNOSIS — G89.29 OTHER CHRONIC PAIN: ICD-10-CM

## 2021-07-20 DIAGNOSIS — J40 BRONCHITIS: ICD-10-CM

## 2021-07-20 RX ORDER — DULAGLUTIDE 1.5 MG/.5ML
INJECTION, SOLUTION SUBCUTANEOUS
Qty: 2 ML | Refills: 3 | Status: SHIPPED | OUTPATIENT
Start: 2021-07-20 | End: 2021-11-03

## 2021-07-26 DIAGNOSIS — I10 ESSENTIAL HYPERTENSION: ICD-10-CM

## 2021-07-26 DIAGNOSIS — E78.2 MIXED HYPERLIPIDEMIA: ICD-10-CM

## 2021-07-26 DIAGNOSIS — M54.41 CHRONIC BILATERAL LOW BACK PAIN WITH RIGHT-SIDED SCIATICA: ICD-10-CM

## 2021-07-26 DIAGNOSIS — R80.9 TYPE 2 DIABETES MELLITUS WITH MICROALBUMINURIA, WITH LONG-TERM CURRENT USE OF INSULIN (HCC): ICD-10-CM

## 2021-07-26 DIAGNOSIS — J40 BRONCHITIS: ICD-10-CM

## 2021-07-26 DIAGNOSIS — G89.29 CHRONIC BILATERAL LOW BACK PAIN WITH RIGHT-SIDED SCIATICA: ICD-10-CM

## 2021-07-26 DIAGNOSIS — F11.21 OPIOID DEPENDENCE IN REMISSION (HCC): ICD-10-CM

## 2021-07-26 DIAGNOSIS — J45.40 MODERATE PERSISTENT ASTHMA, UNSPECIFIED WHETHER COMPLICATED: ICD-10-CM

## 2021-07-26 DIAGNOSIS — Z79.4 TYPE 2 DIABETES MELLITUS WITH MICROALBUMINURIA, WITH LONG-TERM CURRENT USE OF INSULIN (HCC): ICD-10-CM

## 2021-07-26 DIAGNOSIS — E55.9 VITAMIN D DEFICIENCY: ICD-10-CM

## 2021-07-26 DIAGNOSIS — G89.29 CHRONIC PAIN OF RIGHT KNEE: ICD-10-CM

## 2021-07-26 DIAGNOSIS — B35.3 TINEA PEDIS OF LEFT FOOT: ICD-10-CM

## 2021-07-26 DIAGNOSIS — E87.6 HYPOKALEMIA: ICD-10-CM

## 2021-07-26 DIAGNOSIS — E11.29 TYPE 2 DIABETES MELLITUS WITH MICROALBUMINURIA, WITH LONG-TERM CURRENT USE OF INSULIN (HCC): ICD-10-CM

## 2021-07-26 DIAGNOSIS — B35.9 RINGWORM: ICD-10-CM

## 2021-07-26 DIAGNOSIS — E78.1 HYPERTRIGLYCERIDEMIA: ICD-10-CM

## 2021-07-26 DIAGNOSIS — L60.0 INGROWING NAIL, RIGHT GREAT TOE: ICD-10-CM

## 2021-07-26 DIAGNOSIS — R06.2 WHEEZING: ICD-10-CM

## 2021-07-26 DIAGNOSIS — M54.2 NECK PAIN: ICD-10-CM

## 2021-07-26 DIAGNOSIS — G89.29 OTHER CHRONIC PAIN: ICD-10-CM

## 2021-07-26 DIAGNOSIS — F14.10 COCAINE ABUSE (HCC): ICD-10-CM

## 2021-07-26 DIAGNOSIS — R63.1 POLYDIPSIA: ICD-10-CM

## 2021-07-26 DIAGNOSIS — M25.561 CHRONIC PAIN OF RIGHT KNEE: ICD-10-CM

## 2021-07-26 DIAGNOSIS — R21 RASH: ICD-10-CM

## 2021-07-26 DIAGNOSIS — R35.89 POLYURIA: ICD-10-CM

## 2021-07-26 DIAGNOSIS — F17.200 SMOKER: ICD-10-CM

## 2021-07-27 RX ORDER — EZETIMIBE 10 MG/1
10 TABLET ORAL DAILY
Qty: 30 TABLET | Refills: 3 | Status: SHIPPED | OUTPATIENT
Start: 2021-07-27 | End: 2021-11-22

## 2021-07-27 RX ORDER — ATORVASTATIN CALCIUM 20 MG/1
20 TABLET, FILM COATED ORAL DAILY
Qty: 30 TABLET | Refills: 3 | Status: SHIPPED | OUTPATIENT
Start: 2021-07-27 | End: 2021-10-20

## 2021-07-29 ENCOUNTER — OFFICE VISIT (OUTPATIENT)
Dept: FAMILY MEDICINE CLINIC | Age: 46
End: 2021-07-29
Payer: COMMERCIAL

## 2021-07-29 VITALS
SYSTOLIC BLOOD PRESSURE: 142 MMHG | BODY MASS INDEX: 40.73 KG/M2 | OXYGEN SATURATION: 98 % | WEIGHT: 292 LBS | HEART RATE: 68 BPM | DIASTOLIC BLOOD PRESSURE: 110 MMHG

## 2021-07-29 DIAGNOSIS — R80.9 TYPE 2 DIABETES MELLITUS WITH MICROALBUMINURIA, WITH LONG-TERM CURRENT USE OF INSULIN (HCC): Primary | ICD-10-CM

## 2021-07-29 DIAGNOSIS — Z79.4 TYPE 2 DIABETES MELLITUS WITH MICROALBUMINURIA, WITH LONG-TERM CURRENT USE OF INSULIN (HCC): Primary | ICD-10-CM

## 2021-07-29 DIAGNOSIS — I10 ESSENTIAL HYPERTENSION: ICD-10-CM

## 2021-07-29 DIAGNOSIS — E11.29 TYPE 2 DIABETES MELLITUS WITH MICROALBUMINURIA, WITH LONG-TERM CURRENT USE OF INSULIN (HCC): Primary | ICD-10-CM

## 2021-07-29 PROCEDURE — G8417 CALC BMI ABV UP PARAM F/U: HCPCS | Performed by: INTERNAL MEDICINE

## 2021-07-29 PROCEDURE — 99214 OFFICE O/P EST MOD 30 MIN: CPT | Performed by: INTERNAL MEDICINE

## 2021-07-29 PROCEDURE — 99214 OFFICE O/P EST MOD 30 MIN: CPT

## 2021-07-29 PROCEDURE — 2022F DILAT RTA XM EVC RTNOPTHY: CPT | Performed by: INTERNAL MEDICINE

## 2021-07-29 PROCEDURE — G8427 DOCREV CUR MEDS BY ELIG CLIN: HCPCS | Performed by: INTERNAL MEDICINE

## 2021-07-29 PROCEDURE — 4004F PT TOBACCO SCREEN RCVD TLK: CPT | Performed by: INTERNAL MEDICINE

## 2021-07-29 PROCEDURE — 3051F HG A1C>EQUAL 7.0%<8.0%: CPT | Performed by: INTERNAL MEDICINE

## 2021-07-29 RX ORDER — HYDROCHLOROTHIAZIDE 25 MG/1
25 TABLET ORAL EVERY MORNING
Qty: 90 TABLET | Refills: 5 | Status: SHIPPED | OUTPATIENT
Start: 2021-07-29 | End: 2021-10-26

## 2021-07-29 RX ORDER — LOSARTAN POTASSIUM 100 MG/1
100 TABLET ORAL DAILY
Qty: 90 TABLET | Refills: 5 | Status: SHIPPED | OUTPATIENT
Start: 2021-07-29 | End: 2021-10-26

## 2021-07-29 NOTE — PROGRESS NOTES
[address]  [address] [place] [address]  Dept: [phone]  Dept Fax: [phone]  Loc: [phone]     Visit Date:  7/29/2021    Patient:  Sheela Brown  YOB: 1975    HPI:   Sheela Brown presents today for   Chief Complaint   Patient presents with    Medication Refill     patient is here today for medication refills   . HPI 41year old male with a history of anxiety/borderline personality problems, bipolar disorder, morbid obesity BMI 38,HTN, Asthma/COPD 30ppd smoking, arthritis/chronic low back pain, knee pain,ringworm, heroin/cocaine/opiod abuse/addiction  is coming in for med refills. His last appointment looks like he had court appt/incarcerated. Is living in sober house. Has been running out of his blood pressure medicine his blood pressure was 142/110 with heart rate of 60. He has no subjective complaints today. Sugars doing pretty well denies any low blood sugar attacks. He is requesting refills on his losartan/HCTZ. Medications  Prior to Visit Medications    Medication Sig Taking?  Authorizing Provider   atorvastatin (LIPITOR) 20 MG tablet TAKE 1 TABLET BY MOUTH DAILY Yes Martha Bryan MD   TRULICITY 1.5 MY/9.9VL SOPN INJECT 1.5MG SUBCUTANEOUSLY ONCE EVERY WEEK Yes Martha Bryan MD   losartan (COZAAR) 100 MG tablet TAKE 1 TABLET BY MOUTH DAILY Yes Martha Bryan MD   hydroCHLOROthiazide (HYDRODIURIL) 25 MG tablet TAKE 1 TABLET BY MOUTH EVERY MORNING Yes Martha Bryan MD   Cholecalciferol (VITAMIN D3) 50 MCG (2000 UT) TABS TAKE 1 TABLET BY MOUTH DAILY Yes Martha Bryan MD   Insulin Pen Needle (ULTICARE SHORT PEN NEEDLES) 31G X 8 MM MISC USE WITH LANTUS NIGHTLY Yes Martha Bryan MD   potassium chloride (KLOR-CON M) 20 MEQ extended release tablet Take 1 tablet by mouth daily Yes Martha Bryan MD   metoprolol succinate (TOPROL XL) 50 MG extended release tablet Take 1 tablet by mouth daily Yes Andrae Sage MD   Blood Pressure Monitoring (B-D ASSURE BPM/MANUAL ARM CUFF) MISC 1 applicator by Does not apply route 2 times daily Check BP twice daily Yes Andrae Sage MD   SYMBICORT 160-4.5 MCG/ACT AERO INHALE 2 PUFFS BY MOUTH TWICE A DAY Yes Andrae Sage MD   naproxen (NAPROSYN) 500 MG tablet Take 1 tablet by mouth 2 times daily as needed for Pain Yes Andrae Sage MD   hydrOXYzine (ATARAX) 25 MG tablet  Yes Historical Provider, MD   insulin glargine (LANTUS SOLOSTAR) 100 UNIT/ML injection pen Inject 25 Units into the skin 2 times daily Yes Andrae Sage MD   metFORMIN (GLUCOPHAGE) 1000 MG tablet Take 1 tablet by mouth 2 times daily (with meals) Yes Andrae Sage MD   blood glucose monitor kit and supplies Dispense all needed supplies to include: monitor, strips, lancing device, lancets, control solutions, alcohol swabs. Yes Andrae Sage MD   Lancets MISC 1 each by Does not apply route 3 times daily Yes Andrae Sage MD   blood glucose monitor strips Test 3 times a day & as needed for symptoms of irregular blood glucose. Dispense sufficient amount for indicated testing frequency.  Yes Andrae Sage MD   albuterol sulfate  (90 Base) MCG/ACT inhaler Inhale 2 puffs into the lungs every 4 hours as needed for Wheezing or Shortness of Breath (cough) Yes Andrae Sage MD   hydrOXYzine (VISTARIL) 50 MG capsule take 1 capsule by mouth at bedtime Yes Historical Provider, MD   OXcarbazepine (TRILEPTAL) 300 MG tablet Take 1 tablet by mouth daily Yes Historical Provider, MD   ezetimibe (ZETIA) 10 MG tablet TAKE 1 TABLET BY MOUTH DAILY  Andrae Sage MD   glucose 4 g chewable tablet Take 4 tablets by mouth as needed for Low blood sugar  Patient not taking: Reported on 7/29/2021  Andrae Sage MD   prazosin (MINIPRESS) 2 MG capsule Take 2 capsules by mouth daily  Patient not taking: Reported on 7/29/2021  Historical Provider, MD Allergies:  is allergic to amoxicillin, other, and pcn [penicillins]. Past Medical History:   has a past medical history of Bipolar 1 disorder ([facility] [address] History of heroin abuse ([facility] [address] Hypertension, and Personality disorder ([facility] [address] Past Surgical History   has a past surgical history that includes knee surgery (Left) and Finger surgery (Left). Family History  family history includes Cancer in his father; Diabetes in his mother; Heart Disease in his father; High Blood Pressure in his father and mother; Kidney Disease in his mother. Social History   reports that he has been smoking cigarettes. He uses smokeless tobacco. He reports previous alcohol use. Health Maintenance:    Health Maintenance   Topic Date Due    Hepatitis C screen  Never done    Pneumococcal 0-64 years Vaccine (1 of 2 - PPSV23) Never done    Diabetic foot exam  Never done    Diabetic retinal exam  Never done    HIV screen  Never done    Hepatitis B vaccine (1 of 3 - Risk 3-dose series) Never done    DTaP/Tdap/Td vaccine (1 - Tdap) 09/16/2014    Colon cancer screen colonoscopy  Never done    Flu vaccine (1) 09/01/2021    Lipid screen  03/22/2022    Potassium monitoring  03/22/2022    Creatinine monitoring  03/22/2022    A1C test (Diabetic or Prediabetic)  06/09/2022    COVID-19 Vaccine  Completed    Hepatitis A vaccine  Aged Out    Hib vaccine  Aged Out    Meningococcal (ACWY) vaccine  Aged Out       Subjective:      Review of Systems   Constitutional: Negative for fatigue, fever and unexpected weight change. HENT: Negative for ear pain, postnasal drip, rhinorrhea, sinus pain, sore throat and trouble swallowing. Eyes: Negative for visual disturbance. Respiratory: Negative for cough, chest tightness and shortness of breath. Cardiovascular: Negative for chest pain and leg swelling. Gastrointestinal: Negative for abdominal pain, blood in stool and diarrhea. Endocrine: Negative for polyuria.    Genitourinary: Negative for difficulty urinating and flank pain. Musculoskeletal: Positive for arthralgias, back pain and myalgias. Negative for joint swelling. Skin: Negative for rash. Allergic/Immunologic: Negative for environmental allergies. Neurological: Negative for weakness, light-headedness, numbness and headaches. Hematological: Negative for adenopathy. Psychiatric/Behavioral: Negative for behavioral problems and suicidal ideas. The patient is not nervous/anxious. Objective:     BP (!) 142/110 (Site: Right Upper Arm, Position: Sitting)   Pulse 68   Wt 292 lb (132.5 kg)   SpO2 98%   BMI 40.73 kg/m²     Physical Exam  Vitals and nursing note reviewed. HENT:      Head: Normocephalic and atraumatic. Cardiovascular:      Rate and Rhythm: Normal rate and regular rhythm. Pulmonary:      Effort: Pulmonary effort is normal. No respiratory distress. Breath sounds: Normal breath sounds. No stridor. No wheezing or rales. Chest:      Chest wall: No tenderness. Abdominal:      General: Abdomen is flat. Bowel sounds are normal. There is no distension. Palpations: Abdomen is soft. There is no mass. Tenderness: There is no abdominal tenderness. There is no right CVA tenderness, left CVA tenderness, guarding or rebound. Hernia: No hernia is present. Musculoskeletal:      Right lower leg: No edema. Skin:     General: Skin is warm. Neurological:      Mental Status: He is oriented to person, place, and time. Mental status is at baseline. Psychiatric:         Mood and Affect: Mood normal.             Assessment       Diagnosis Orders   1. Type 2 diabetes mellitus with microalbuminuria, with long-term current use of insulin ([facility])  losartan (COZAAR) 100 MG tablet    hydroCHLOROthiazide (HYDRODIURIL) 25 MG tablet   2. Essential hypertension  losartan (COZAAR) 100 MG tablet    hydroCHLOROthiazide (HYDRODIURIL) 25 MG tablet         PLAN   Refills placed.  Encourage compliance with the medications. Healthy nutrition/weight loss/dietary modifications    No orders of the defined types were placed in this encounter. No follow-ups on file. Patient given educational materials - see patient instructions. Discussed use, benefit, and side effects of prescribed medications. All patient questions answered. Pt voiced understanding. Reviewed health maintenance.        Electronically signed Rhonda Castro MD on 7/29/2021 at 10:58 AM EDT

## 2021-08-03 ASSESSMENT — ENCOUNTER SYMPTOMS
RHINORRHEA: 0
CHEST TIGHTNESS: 0
SORE THROAT: 0
ABDOMINAL PAIN: 0
SHORTNESS OF BREATH: 0
BLOOD IN STOOL: 0
TROUBLE SWALLOWING: 0
SINUS PAIN: 0
DIARRHEA: 0
COUGH: 0
BACK PAIN: 1

## 2021-08-09 DIAGNOSIS — G89.29 CHRONIC PAIN OF RIGHT KNEE: ICD-10-CM

## 2021-08-09 DIAGNOSIS — E78.1 HYPERTRIGLYCERIDEMIA: ICD-10-CM

## 2021-08-09 DIAGNOSIS — E11.29 TYPE 2 DIABETES MELLITUS WITH MICROALBUMINURIA, WITH LONG-TERM CURRENT USE OF INSULIN (HCC): ICD-10-CM

## 2021-08-09 DIAGNOSIS — L60.0 INGROWING NAIL, RIGHT GREAT TOE: ICD-10-CM

## 2021-08-09 DIAGNOSIS — J40 BRONCHITIS: ICD-10-CM

## 2021-08-09 DIAGNOSIS — F17.200 SMOKER: ICD-10-CM

## 2021-08-09 DIAGNOSIS — G89.29 CHRONIC BILATERAL LOW BACK PAIN WITH RIGHT-SIDED SCIATICA: ICD-10-CM

## 2021-08-09 DIAGNOSIS — M54.41 CHRONIC BILATERAL LOW BACK PAIN WITH RIGHT-SIDED SCIATICA: ICD-10-CM

## 2021-08-09 DIAGNOSIS — R80.9 TYPE 2 DIABETES MELLITUS WITH MICROALBUMINURIA, WITH LONG-TERM CURRENT USE OF INSULIN (HCC): ICD-10-CM

## 2021-08-09 DIAGNOSIS — E87.6 HYPOKALEMIA: ICD-10-CM

## 2021-08-09 DIAGNOSIS — R06.2 WHEEZING: ICD-10-CM

## 2021-08-09 DIAGNOSIS — E55.9 VITAMIN D DEFICIENCY: ICD-10-CM

## 2021-08-09 DIAGNOSIS — M25.561 CHRONIC PAIN OF RIGHT KNEE: ICD-10-CM

## 2021-08-09 DIAGNOSIS — J45.40 MODERATE PERSISTENT ASTHMA, UNSPECIFIED WHETHER COMPLICATED: ICD-10-CM

## 2021-08-09 DIAGNOSIS — M54.2 NECK PAIN: ICD-10-CM

## 2021-08-09 DIAGNOSIS — F11.21 OPIOID DEPENDENCE IN REMISSION (HCC): ICD-10-CM

## 2021-08-09 DIAGNOSIS — R63.1 POLYDIPSIA: ICD-10-CM

## 2021-08-09 DIAGNOSIS — R21 RASH: ICD-10-CM

## 2021-08-09 DIAGNOSIS — Z79.4 TYPE 2 DIABETES MELLITUS WITH MICROALBUMINURIA, WITH LONG-TERM CURRENT USE OF INSULIN (HCC): ICD-10-CM

## 2021-08-09 DIAGNOSIS — F14.10 COCAINE ABUSE (HCC): ICD-10-CM

## 2021-08-09 DIAGNOSIS — B35.3 TINEA PEDIS OF LEFT FOOT: ICD-10-CM

## 2021-08-09 DIAGNOSIS — R35.89 POLYURIA: ICD-10-CM

## 2021-08-09 DIAGNOSIS — I10 ESSENTIAL HYPERTENSION: ICD-10-CM

## 2021-08-09 DIAGNOSIS — B35.9 RINGWORM: ICD-10-CM

## 2021-08-09 DIAGNOSIS — G89.29 OTHER CHRONIC PAIN: ICD-10-CM

## 2021-08-09 RX ORDER — INSULIN GLARGINE 100 [IU]/ML
INJECTION, SOLUTION SUBCUTANEOUS
Qty: 15 ML | Refills: 3 | Status: SHIPPED | OUTPATIENT
Start: 2021-08-09 | End: 2021-12-09

## 2021-08-09 NOTE — TELEPHONE ENCOUNTER
Chi Burgess is requesting a refill on the following medication(s):  Requested Prescriptions     Pending Prescriptions Disp Refills    LANTUS SOLOSTAR 100 UNIT/ML injection pen [Pharmacy Med Name: Lantus SoloStar 100UNIT/ML SOPN] 15 mL      Sig: INJECT 25 UNITS SUBCUTANEOUSLY TWICE A DAY       Last Visit Date (If Applicable):  1/2/6111    Next Visit Date:    Visit date not found

## 2021-08-31 DIAGNOSIS — G89.29 CHRONIC PAIN OF RIGHT KNEE: ICD-10-CM

## 2021-08-31 DIAGNOSIS — E78.1 HYPERTRIGLYCERIDEMIA: ICD-10-CM

## 2021-08-31 DIAGNOSIS — J45.40 MODERATE PERSISTENT ASTHMA, UNSPECIFIED WHETHER COMPLICATED: ICD-10-CM

## 2021-08-31 DIAGNOSIS — L60.0 INGROWING NAIL, RIGHT GREAT TOE: ICD-10-CM

## 2021-08-31 DIAGNOSIS — B35.3 TINEA PEDIS OF LEFT FOOT: ICD-10-CM

## 2021-08-31 DIAGNOSIS — R06.2 WHEEZING: ICD-10-CM

## 2021-08-31 DIAGNOSIS — E55.9 VITAMIN D DEFICIENCY: ICD-10-CM

## 2021-08-31 DIAGNOSIS — B35.9 RINGWORM: ICD-10-CM

## 2021-08-31 DIAGNOSIS — M54.41 CHRONIC BILATERAL LOW BACK PAIN WITH RIGHT-SIDED SCIATICA: ICD-10-CM

## 2021-08-31 DIAGNOSIS — G89.29 OTHER CHRONIC PAIN: ICD-10-CM

## 2021-08-31 DIAGNOSIS — R35.89 POLYURIA: ICD-10-CM

## 2021-08-31 DIAGNOSIS — G89.29 CHRONIC BILATERAL LOW BACK PAIN WITH RIGHT-SIDED SCIATICA: ICD-10-CM

## 2021-08-31 DIAGNOSIS — F14.10 COCAINE ABUSE (HCC): ICD-10-CM

## 2021-08-31 DIAGNOSIS — Z79.4 TYPE 2 DIABETES MELLITUS WITH MICROALBUMINURIA, WITH LONG-TERM CURRENT USE OF INSULIN (HCC): ICD-10-CM

## 2021-08-31 DIAGNOSIS — J40 BRONCHITIS: ICD-10-CM

## 2021-08-31 DIAGNOSIS — R63.1 POLYDIPSIA: ICD-10-CM

## 2021-08-31 DIAGNOSIS — I10 ESSENTIAL HYPERTENSION: ICD-10-CM

## 2021-08-31 DIAGNOSIS — R21 RASH: ICD-10-CM

## 2021-08-31 DIAGNOSIS — M25.561 CHRONIC PAIN OF RIGHT KNEE: ICD-10-CM

## 2021-08-31 DIAGNOSIS — M54.2 NECK PAIN: ICD-10-CM

## 2021-08-31 DIAGNOSIS — E11.29 TYPE 2 DIABETES MELLITUS WITH MICROALBUMINURIA, WITH LONG-TERM CURRENT USE OF INSULIN (HCC): ICD-10-CM

## 2021-08-31 DIAGNOSIS — R80.9 TYPE 2 DIABETES MELLITUS WITH MICROALBUMINURIA, WITH LONG-TERM CURRENT USE OF INSULIN (HCC): ICD-10-CM

## 2021-08-31 DIAGNOSIS — E87.6 HYPOKALEMIA: ICD-10-CM

## 2021-08-31 DIAGNOSIS — F11.21 OPIOID DEPENDENCE IN REMISSION (HCC): ICD-10-CM

## 2021-08-31 DIAGNOSIS — F17.200 SMOKER: ICD-10-CM

## 2021-09-01 RX ORDER — POTASSIUM CHLORIDE 20 MEQ/1
20 TABLET, EXTENDED RELEASE ORAL DAILY
Qty: 30 TABLET | Refills: 1 | Status: SHIPPED | OUTPATIENT
Start: 2021-09-01 | End: 2021-11-03

## 2021-09-01 NOTE — TELEPHONE ENCOUNTER
Crow Giordano is requesting a refill on the following medication(s):  Requested Prescriptions     Pending Prescriptions Disp Refills    metFORMIN (GLUCOPHAGE) 1000 MG tablet [Pharmacy Med Name: metFORMIN HCl 1000MG TABS*] 60 tablet      Sig: TAKE 1 TABLET BY MOUTH TWICE A DAY WITH MEALS    potassium chloride (KLOR-CON M) 20 MEQ extended release tablet [Pharmacy Med Name: Potassium Chloride [place] ER 20MEQ TBCR] 30 tablet 1     Sig: TAKE 1 TABLET BY MOUTH DAILY       Last Visit Date (If Applicable):  2/6/2887    Next Visit Date:    Visit date not found

## 2021-09-02 ENCOUNTER — OFFICE VISIT (OUTPATIENT)
Dept: FAMILY MEDICINE CLINIC | Age: 46
End: 2021-09-02
Payer: COMMERCIAL

## 2021-09-02 VITALS
HEART RATE: 86 BPM | DIASTOLIC BLOOD PRESSURE: 89 MMHG | WEIGHT: 296.2 LBS | SYSTOLIC BLOOD PRESSURE: 138 MMHG | OXYGEN SATURATION: 98 % | TEMPERATURE: 98.1 F | HEIGHT: 71 IN | RESPIRATION RATE: 12 BRPM | BODY MASS INDEX: 41.47 KG/M2

## 2021-09-02 DIAGNOSIS — I10 ESSENTIAL HYPERTENSION: Primary | ICD-10-CM

## 2021-09-02 DIAGNOSIS — Z79.4 TYPE 2 DIABETES MELLITUS WITH MICROALBUMINURIA, WITH LONG-TERM CURRENT USE OF INSULIN (HCC): ICD-10-CM

## 2021-09-02 DIAGNOSIS — E66.01 CLASS 3 SEVERE OBESITY DUE TO EXCESS CALORIES WITH SERIOUS COMORBIDITY AND BODY MASS INDEX (BMI) OF 40.0 TO 44.9 IN ADULT (HCC): ICD-10-CM

## 2021-09-02 DIAGNOSIS — E11.29 TYPE 2 DIABETES MELLITUS WITH MICROALBUMINURIA, WITH LONG-TERM CURRENT USE OF INSULIN (HCC): ICD-10-CM

## 2021-09-02 DIAGNOSIS — R80.9 TYPE 2 DIABETES MELLITUS WITH MICROALBUMINURIA, WITH LONG-TERM CURRENT USE OF INSULIN (HCC): ICD-10-CM

## 2021-09-02 PROCEDURE — G8427 DOCREV CUR MEDS BY ELIG CLIN: HCPCS | Performed by: INTERNAL MEDICINE

## 2021-09-02 PROCEDURE — 99214 OFFICE O/P EST MOD 30 MIN: CPT | Performed by: INTERNAL MEDICINE

## 2021-09-02 PROCEDURE — 2022F DILAT RTA XM EVC RTNOPTHY: CPT | Performed by: INTERNAL MEDICINE

## 2021-09-02 PROCEDURE — 99214 OFFICE O/P EST MOD 30 MIN: CPT

## 2021-09-02 PROCEDURE — 3051F HG A1C>EQUAL 7.0%<8.0%: CPT | Performed by: INTERNAL MEDICINE

## 2021-09-02 PROCEDURE — 4004F PT TOBACCO SCREEN RCVD TLK: CPT | Performed by: INTERNAL MEDICINE

## 2021-09-02 PROCEDURE — G8417 CALC BMI ABV UP PARAM F/U: HCPCS | Performed by: INTERNAL MEDICINE

## 2021-09-02 RX ORDER — PHENTERMINE HYDROCHLORIDE 37.5 MG/1
37.5 CAPSULE ORAL EVERY MORNING
Qty: 30 CAPSULE | Refills: 0 | Status: SHIPPED | OUTPATIENT
Start: 2021-09-02 | End: 2021-11-11 | Stop reason: SDUPTHER

## 2021-09-02 SDOH — ECONOMIC STABILITY: FOOD INSECURITY: WITHIN THE PAST 12 MONTHS, YOU WORRIED THAT YOUR FOOD WOULD RUN OUT BEFORE YOU GOT MONEY TO BUY MORE.: PATIENT DECLINED

## 2021-09-02 SDOH — ECONOMIC STABILITY: FOOD INSECURITY: WITHIN THE PAST 12 MONTHS, THE FOOD YOU BOUGHT JUST DIDN'T LAST AND YOU DIDN'T HAVE MONEY TO GET MORE.: PATIENT DECLINED

## 2021-09-02 ASSESSMENT — SOCIAL DETERMINANTS OF HEALTH (SDOH): HOW HARD IS IT FOR YOU TO PAY FOR THE VERY BASICS LIKE FOOD, HOUSING, MEDICAL CARE, AND HEATING?: PATIENT DECLINED

## 2021-09-02 NOTE — PROGRESS NOTES
[address] [place]  [address] [facility] [address]  Dept: [phone]  Dept Fax: [phone]  Loc: [phone]     Visit Date:  9/2/2021    Patient:  Didier Neri  YOB: 1975    HPI:   Didier Neri presents today for   Chief Complaint   Patient presents with    Diabetes     Patient is here for a 1 month follow up. Mendez Reina HPI  41year old male with a history of anxiety/borderline personality problems, bipolar disorder, morbid obesity BMI 38,HTN, Asthma/COPD 30ppd smoking, arthritis/chronic low back pain, knee pain,ringworm, heroin/cocaine/opiod abuse/addiction  is coming for 1 month follow up. Obesity -BMI 41. He is excessively concerned about his recent weight gain likely due to antipsychotic medicines as well as insulin use. He has been clean and sober for the past several months and he reports he continues to stay sober house. Discussed weight loss medicine last time and is willing to start something today. We will start with phentermine sympathomimetic medication use is a controlled medicine he understands risk and benefits of the medicine and he should not be abusing these medicines. Medications  Prior to Visit Medications    Medication Sig Taking?  Authorizing Provider   metFORMIN (GLUCOPHAGE) 1000 MG tablet TAKE 1 TABLET BY MOUTH TWICE A DAY WITH MEALS Yes Kae Dominguez MD   potassium chloride (KLOR-CON M) 20 MEQ extended release tablet TAKE 1 TABLET BY MOUTH DAILY Yes Kae Dominguez MD   LANTUS SOLOSTAR 100 UNIT/ML injection pen INJECT 25 Elaine Comes Yes Kae Dominguez MD   losartan (COZAAR) 100 MG tablet Take 1 tablet by mouth daily Yes Kae Dominguez MD   hydroCHLOROthiazide (HYDRODIURIL) 25 MG tablet Take 1 tablet by mouth every morning Yes Kae Dominguez MD   atorvastatin (LIPITOR) 20 MG tablet TAKE 1 TABLET BY MOUTH DAILY Yes Grace Bianka Bae MD   ezetimibe (ZETIA) 10 MG tablet TAKE 1 TABLET BY MOUTH DAILY Yes Roby Nicole MD   TRULICITY 1.5 CH/3.1QO SOPN INJECT 1.5MG SUBCUTANEOUSLY ONCE EVERY WEEK Yes Roby Nicole MD   Cholecalciferol (VITAMIN D3) 50 MCG (2000 UT) TABS TAKE 1 TABLET BY MOUTH DAILY Yes Roby Nicole MD   Insulin Pen Needle (ULTICARE SHORT PEN NEEDLES) 31G X 8 MM MISC USE WITH LANTUS NIGHTLY Yes Roby Nicole MD   metoprolol succinate (TOPROL XL) 50 MG extended release tablet Take 1 tablet by mouth daily Yes Roby Nicole MD   SYMBICORT 160-4.5 MCG/ACT AERO INHALE 2 PUFFS BY MOUTH TWICE A DAY Yes Roby Nicole MD   naproxen (NAPROSYN) 500 MG tablet Take 1 tablet by mouth 2 times daily as needed for Pain Yes Roby Nicole MD   blood glucose monitor kit and supplies Dispense all needed supplies to include: monitor, strips, lancing device, lancets, control solutions, alcohol swabs. Yes Roby Nicole MD   Lancets MISC 1 each by Does not apply route 3 times daily Yes Roby Nicole MD   blood glucose monitor strips Test 3 times a day & as needed for symptoms of irregular blood glucose. Dispense sufficient amount for indicated testing frequency.  Yes Roby Nicole MD   albuterol sulfate  (90 Base) MCG/ACT inhaler Inhale 2 puffs into the lungs every 4 hours as needed for Wheezing or Shortness of Breath (cough) Yes Roby Nicole MD   hydrOXYzine (VISTARIL) 50 MG capsule take 1 capsule by mouth at bedtime Yes Historical Provider, MD   OXcarbazepine (TRILEPTAL) 300 MG tablet Take 1 tablet by mouth daily Yes Historical Provider, MD   Blood Pressure Monitoring (B-D ASSURE BPM/MANUAL ARM CUFF) MISC 1 applicator by Does not apply route 2 times daily Check BP twice daily  Patient not taking: Reported on 9/2/2021  Roby Nicole MD   glucose 4 g chewable tablet Take 4 tablets by mouth as needed for Low blood sugar  Patient not taking: Reported on 7/29/2021  Roby Nicole MD   hydrOXYzine (ATARAX) 25 MG tablet Historical Provider, MD   prazosin (MINIPRESS) 2 MG capsule Take 2 capsules by mouth daily  Patient not taking: Reported on 7/29/2021  Historical Provider, MD        Allergies:  is allergic to amoxicillin, other, and pcn [penicillins]. Past Medical History:   has a past medical history of Bipolar 1 disorder ([facility] [address] History of heroin abuse ([facility] [address] Hypertension, and Personality disorder ([facility] [address] Past Surgical History   has a past surgical history that includes knee surgery (Left) and Finger surgery (Left). Family History  family history includes Cancer in his father; Diabetes in his mother; Heart Disease in his father; High Blood Pressure in his father and mother; Kidney Disease in his mother. Social History   reports that he has been smoking cigarettes. He uses smokeless tobacco. He reports previous alcohol use. Health Maintenance:    Health Maintenance   Topic Date Due    Hepatitis C screen  Never done    Pneumococcal 0-64 years Vaccine (1 of 2 - PPSV23) Never done    Diabetic foot exam  Never done    Diabetic retinal exam  Never done    HIV screen  Never done    Hepatitis B vaccine (1 of 3 - Risk 3-dose series) Never done    DTaP/Tdap/Td vaccine (1 - Tdap) 09/16/2014    Colon cancer screen colonoscopy  Never done    Flu vaccine (1) Never done    Lipid screen  03/22/2022    Potassium monitoring  03/22/2022    Creatinine monitoring  03/22/2022    A1C test (Diabetic or Prediabetic)  06/09/2022    COVID-19 Vaccine  Completed    Hepatitis A vaccine  Aged Out    Hib vaccine  Aged Out    Meningococcal (ACWY) vaccine  Aged Out       Subjective:      Review of Systems   Constitutional: Positive for unexpected weight change. Negative for fatigue and fever. HENT: Negative for ear pain, postnasal drip, rhinorrhea, sinus pain, sore throat and trouble swallowing. Eyes: Negative for visual disturbance. Respiratory: Negative for cough, chest tightness and shortness of breath.     Cardiovascular: Negative for chest pain and leg swelling. Gastrointestinal: Negative for abdominal pain, blood in stool and diarrhea. Endocrine: Negative for polyuria. Genitourinary: Negative for difficulty urinating and flank pain. Musculoskeletal: Positive for arthralgias, back pain and myalgias. Negative for joint swelling. Skin: Negative for rash. Allergic/Immunologic: Negative for environmental allergies. Neurological: Negative for weakness, light-headedness, numbness and headaches. Hematological: Negative for adenopathy. Psychiatric/Behavioral: Negative for behavioral problems and suicidal ideas. The patient is not nervous/anxious. Objective:     /89 (Site: Right Upper Arm, Position: Sitting, Cuff Size: Large Adult)   Pulse 86   Temp 98.1 °F (36.7 °C) (Tympanic)   Resp 12   Ht 5' 11\" (1.803 m)   Wt 296 lb 3.2 oz (134.4 kg)   SpO2 98%   BMI 41.31 kg/m²     Physical Exam  Vitals and nursing note reviewed. HENT:      Head: Normocephalic and atraumatic. Cardiovascular:      Rate and Rhythm: Normal rate and regular rhythm. Pulses: Normal pulses. Heart sounds: Normal heart sounds. Pulmonary:      Effort: Pulmonary effort is normal.      Breath sounds: Normal breath sounds. No stridor. Abdominal:      General: Abdomen is flat. Bowel sounds are normal.      Palpations: Abdomen is soft. Musculoskeletal:         General: Normal range of motion. Right lower leg: No edema. Left lower leg: No edema. Skin:     General: Skin is warm. Neurological:      Mental Status: He is oriented to person, place, and time. Mental status is at baseline. Psychiatric:         Mood and Affect: Mood normal.             Assessment       Diagnosis Orders   1. Essential hypertension  phentermine (ADIPEX-P) 37.5 MG capsule   2. Type 2 diabetes mellitus with microalbuminuria, with long-term current use of insulin ([facility])  phentermine (ADIPEX-P) 37.5 MG capsule   3.  Class 3 severe obesity due to excess calories with serious comorbidity and body mass index (BMI) of 40.0 to 44.9 in adult ([facility])  phentermine (ADIPEX-P) 37.5 MG capsule         PLAN   It seems to me he is not taking all his blood pressure medicine especially beta-blockers printed the list of the medicines and instructed him to take his medicines on regular basis. Started him on phentermine for weight loss but he needs to also work on diet/exercise. Discussed the side effects of phentermine abuse potential and tolerance dependence not to be approved for long-term use may lead to dependency tolerance addiction/cardiovascular problems/heart rythms/tachycardia/BP rise and so forth. Please continue to do check do home blood pressure monitoring. No orders of the defined types were placed in this encounter. No follow-ups on file. Patient given educational materials - see patient instructions. Discussed use, benefit, and side effects of prescribed medications. All patient questions answered. Pt voiced understanding. Reviewed health maintenance.        Electronically signed Corry Maloney MD on 9/2/2021 at 11:44 AM EDT

## 2021-09-07 ASSESSMENT — ENCOUNTER SYMPTOMS
SORE THROAT: 0
SINUS PAIN: 0
SHORTNESS OF BREATH: 0
DIARRHEA: 0
BLOOD IN STOOL: 0
CHEST TIGHTNESS: 0
BACK PAIN: 1
ABDOMINAL PAIN: 0
TROUBLE SWALLOWING: 0
RHINORRHEA: 0
COUGH: 0

## 2021-10-04 ENCOUNTER — TELEPHONE (OUTPATIENT)
Dept: FAMILY MEDICINE CLINIC | Age: 46
End: 2021-10-04

## 2021-10-04 DIAGNOSIS — E66.01 CLASS 3 SEVERE OBESITY DUE TO EXCESS CALORIES WITH SERIOUS COMORBIDITY AND BODY MASS INDEX (BMI) OF 40.0 TO 44.9 IN ADULT (HCC): Primary | ICD-10-CM

## 2021-10-04 RX ORDER — PHENTERMINE HYDROCHLORIDE 37.5 MG/1
37.5 CAPSULE ORAL EVERY MORNING
Qty: 30 CAPSULE | Refills: 0 | Status: SHIPPED | OUTPATIENT
Start: 2021-10-04 | End: 2021-11-03

## 2021-10-04 NOTE — TELEPHONE ENCOUNTER
Needs a refill of Adipex 37.5 mg sees Dr Nanci Bolton next week but it out now.   Please call to [facility] in [place]

## 2021-10-11 ENCOUNTER — OFFICE VISIT (OUTPATIENT)
Dept: FAMILY MEDICINE CLINIC | Age: 46
End: 2021-10-11
Payer: COMMERCIAL

## 2021-10-11 VITALS
HEIGHT: 71 IN | DIASTOLIC BLOOD PRESSURE: 90 MMHG | OXYGEN SATURATION: 94 % | BODY MASS INDEX: 40.88 KG/M2 | WEIGHT: 292 LBS | HEART RATE: 98 BPM | SYSTOLIC BLOOD PRESSURE: 138 MMHG | TEMPERATURE: 97.7 F

## 2021-10-11 DIAGNOSIS — E66.01 CLASS 3 SEVERE OBESITY DUE TO EXCESS CALORIES WITH SERIOUS COMORBIDITY AND BODY MASS INDEX (BMI) OF 40.0 TO 44.9 IN ADULT (HCC): ICD-10-CM

## 2021-10-11 DIAGNOSIS — E11.29 TYPE 2 DIABETES MELLITUS WITH MICROALBUMINURIA, WITH LONG-TERM CURRENT USE OF INSULIN (HCC): ICD-10-CM

## 2021-10-11 DIAGNOSIS — Z79.4 TYPE 2 DIABETES MELLITUS WITH MICROALBUMINURIA, WITH LONG-TERM CURRENT USE OF INSULIN (HCC): ICD-10-CM

## 2021-10-11 DIAGNOSIS — I10 ESSENTIAL HYPERTENSION: Primary | ICD-10-CM

## 2021-10-11 DIAGNOSIS — R80.9 TYPE 2 DIABETES MELLITUS WITH MICROALBUMINURIA, WITH LONG-TERM CURRENT USE OF INSULIN (HCC): ICD-10-CM

## 2021-10-11 LAB — HBA1C MFR BLD: 6.6 %

## 2021-10-11 PROCEDURE — 99214 OFFICE O/P EST MOD 30 MIN: CPT | Performed by: INTERNAL MEDICINE

## 2021-10-11 PROCEDURE — G8427 DOCREV CUR MEDS BY ELIG CLIN: HCPCS | Performed by: INTERNAL MEDICINE

## 2021-10-11 PROCEDURE — G8484 FLU IMMUNIZE NO ADMIN: HCPCS | Performed by: INTERNAL MEDICINE

## 2021-10-11 PROCEDURE — 4004F PT TOBACCO SCREEN RCVD TLK: CPT | Performed by: INTERNAL MEDICINE

## 2021-10-11 PROCEDURE — 2022F DILAT RTA XM EVC RTNOPTHY: CPT | Performed by: INTERNAL MEDICINE

## 2021-10-11 PROCEDURE — 83036 HEMOGLOBIN GLYCOSYLATED A1C: CPT | Performed by: INTERNAL MEDICINE

## 2021-10-11 PROCEDURE — 3044F HG A1C LEVEL LT 7.0%: CPT | Performed by: INTERNAL MEDICINE

## 2021-10-11 PROCEDURE — PBSHW POCT GLYCOSYLATED HEMOGLOBIN (HGB A1C): Performed by: INTERNAL MEDICINE

## 2021-10-11 PROCEDURE — G8417 CALC BMI ABV UP PARAM F/U: HCPCS | Performed by: INTERNAL MEDICINE

## 2021-10-11 NOTE — PROGRESS NOTES
[address]  [address] [place] [address]  Dept: [phone]  Dept Fax: [phone]  Loc: [phone]     Visit Date:  10/11/2021    Patient:  Silke Shah  YOB: 1975    HPI:   Silke Shah presents today for   Chief Complaint   Patient presents with    2 Week Follow-Up     present today follow up on bp    . HPI 41year old male with a history of anxiety/borderline personality problems, bipolar disorder, morbid obesity BMI 38,HTN, Asthma/COPD 30ppd smoking, arthritis/chronic low back pain, knee pain,ringworm, heroin/cocaine/opiod abuse/addiction  is coming for 1 month follow up. Patient reports he has been doing really good last 4 lbs since last seen he is on second month of his phentermine that has been doing a major role in terms of suppressing his appetite. In terms of his blood pressure he continues to take losartan 100 mg chlorothiazide 25 and Toprol-XL 50 mg once a day. His blood pressure was 138/90 with heart rate around 98. No cardiopulmonary related symptoms. for his diabetes type 2 A1c was 6.6. He is on Metformin 1000 mg twice daily Lantus 25 units twice daily and Trulicity denies any low sugar attacks. For his cholesterol hyperlipidemia on Lipitor and Zetia and tolerating the medicines pretty well. Overall he seems to be doing good and feeling good especially with the recent weight loss and better control of his blood pressure as well as blood sugars. Medications  Prior to Visit Medications    Medication Sig Taking? Authorizing Provider   phentermine (ADIPEX-P) 37.5 MG capsule Take 1 capsule by mouth every morning for 30 days.  Yes DAWIT Kern CNP   SYMBICORT 160-4.5 MCG/ACT AERO INHALE 2 PUFFS BY MOUTH TWICE A DAY Yes DAWIT Kovacs - CNP   metFORMIN (GLUCOPHAGE) 1000 MG tablet TAKE 1 TABLET BY MOUTH TWICE A DAY WITH MEALS Yes Elif Live MD   potassium chloride (KLOR-CON M) 20 MEQ extended release tablet TAKE 1 TABLET BY MOUTH DAILY Yes Elif Live MD   LANTUS SOLOSTAR 100 UNIT/ML injection pen INJECT 25 Jon Elkins Yes lEif Live MD   losartan (COZAAR) 100 MG tablet Take 1 tablet by mouth daily Yes Elif Live MD   hydroCHLOROthiazide (HYDRODIURIL) 25 MG tablet Take 1 tablet by mouth every morning Yes Elif Live MD   atorvastatin (LIPITOR) 20 MG tablet TAKE 1 TABLET BY MOUTH DAILY Yes Elif Live MD   ezetimibe (ZETIA) 10 MG tablet TAKE 1 TABLET BY MOUTH DAILY Yes Elif Live MD   TRULICITY 1.5 OD/3.7QG SOPN INJECT 1.5MG SUBCUTANEOUSLY ONCE EVERY WEEK Yes Elif Live MD   Cholecalciferol (VITAMIN D3) 50 MCG (2000 UT) TABS TAKE 1 TABLET BY MOUTH DAILY Yes Elif Live MD   Insulin Pen Needle (ULTICARE SHORT PEN NEEDLES) 31G X 8 MM MISC USE WITH LANTUS NIGHTLY Yes Elif Live MD   metoprolol succinate (TOPROL XL) 50 MG extended release tablet Take 1 tablet by mouth daily Yes Elif Live MD   Blood Pressure Monitoring (B-D ASSURE BPM/MANUAL ARM CUFF) MISC 1 applicator by Does not apply route 2 times daily Check BP twice daily Yes Elif Live MD   naproxen (NAPROSYN) 500 MG tablet Take 1 tablet by mouth 2 times daily as needed for Pain Yes Elif Live MD   hydrOXYzine (ATARAX) 25 MG tablet  Yes Ramos Richardson MD   blood glucose monitor kit and supplies Dispense all needed supplies to include: monitor, strips, lancing device, lancets, control solutions, alcohol swabs. Yes Elif Live MD   Lancets MISC 1 each by Does not apply route 3 times daily Yes Elif Live MD   blood glucose monitor strips Test 3 times a day & as needed for symptoms of irregular blood glucose. Dispense sufficient amount for indicated testing frequency.  Yes Elif Live MD   albuterol sulfate  (90 Base) MCG/ACT inhaler Inhale 2 puffs into the lungs every 4 hours as needed for Wheezing or Shortness of Breath (cough) Yes Earnesteen Hodgkin, MD   hydrOXYzine (VISTARIL) 50 MG capsule take 1 capsule by mouth at bedtime Yes Historical Provider, MD   OXcarbazepine (TRILEPTAL) 300 MG tablet Take 1 tablet by mouth daily Yes Historical Provider, MD   prazosin (MINIPRESS) 2 MG capsule Take 2 capsules by mouth daily  Yes Historical Provider, MD   glucose 4 g chewable tablet Take 4 tablets by mouth as needed for Low blood sugar  Patient not taking: Reported on 10/11/2021  Earnesteen Hodgkin, MD        Allergies:  is allergic to amoxicillin, other, and pcn [penicillins]. Past Medical History:   has a past medical history of Bipolar 1 disorder ([facility] [address] History of heroin abuse ([facility] [address] Hypertension, and Personality disorder ([facility] [address] Past Surgical History   has a past surgical history that includes knee surgery (Left) and Finger surgery (Left). Family History  family history includes Cancer in his father; Diabetes in his mother; Heart Disease in his father; High Blood Pressure in his father and mother; Kidney Disease in his mother. Social History   reports that he has been smoking cigarettes. He uses smokeless tobacco. He reports previous alcohol use.     Health Maintenance:    Health Maintenance   Topic Date Due    Hepatitis C screen  Never done    Pneumococcal 0-64 years Vaccine (1 of 2 - PPSV23) Never done    Diabetic foot exam  Never done    Diabetic retinal exam  Never done    HIV screen  Never done    Hepatitis B vaccine (1 of 3 - Risk 3-dose series) Never done    DTaP/Tdap/Td vaccine (1 - Tdap) 09/16/2014    Colon cancer screen colonoscopy  Never done    Flu vaccine (1) Never done    Lipid screen  03/22/2022    Potassium monitoring  03/22/2022    Creatinine monitoring  03/22/2022    A1C test (Diabetic or Prediabetic)  10/11/2022    COVID-19 Vaccine  Completed    Hepatitis A vaccine  Aged Out    Hib vaccine  Aged Out    Meningococcal (ACWY) vaccine  Aged Out       Subjective: Review of Systems   Constitutional: Negative for fatigue, fever and unexpected weight change. HENT: Negative for ear pain, postnasal drip, rhinorrhea, sinus pain, sore throat and trouble swallowing. Eyes: Negative for visual disturbance. Respiratory: Negative for cough, chest tightness and shortness of breath. Cardiovascular: Negative for chest pain and leg swelling. Gastrointestinal: Negative for abdominal pain, blood in stool and diarrhea. Endocrine: Negative for polyuria. Genitourinary: Negative for difficulty urinating and flank pain. Musculoskeletal: Negative for arthralgias, joint swelling and myalgias. Skin: Negative for rash. Allergic/Immunologic: Negative for environmental allergies. Neurological: Negative for weakness, light-headedness, numbness and headaches. Hematological: Negative for adenopathy. Psychiatric/Behavioral: Negative for behavioral problems and suicidal ideas. The patient is not nervous/anxious. Objective:     BP (!) 138/90   Pulse 98   Temp 97.7 °F (36.5 °C)   Ht 5' 11\" (1.803 m)   Wt 292 lb (132.5 kg)   SpO2 94%   BMI 40.73 kg/m²     Physical Exam  Vitals and nursing note reviewed. HENT:      Head: Normocephalic and atraumatic. Cardiovascular:      Rate and Rhythm: Normal rate and regular rhythm. Pulses: Normal pulses. Heart sounds: Normal heart sounds. Pulmonary:      Effort: Pulmonary effort is normal.      Breath sounds: Normal breath sounds. No stridor. Abdominal:      General: Abdomen is flat. Bowel sounds are normal. There is no distension. Palpations: Abdomen is soft. There is no mass. Tenderness: There is no abdominal tenderness. There is no guarding or rebound. Hernia: No hernia is present. Musculoskeletal:         General: No swelling, tenderness, deformity or signs of injury. Normal range of motion. Right lower leg: No edema. Left lower leg: No edema. Skin:     General: Skin is warm.

## 2021-10-15 ASSESSMENT — ENCOUNTER SYMPTOMS
COUGH: 0
SHORTNESS OF BREATH: 0
SINUS PAIN: 0
DIARRHEA: 0
CHEST TIGHTNESS: 0
RHINORRHEA: 0
TROUBLE SWALLOWING: 0
ABDOMINAL PAIN: 0
BLOOD IN STOOL: 0
SORE THROAT: 0

## 2021-10-20 DIAGNOSIS — R35.89 POLYURIA: ICD-10-CM

## 2021-10-20 DIAGNOSIS — F14.10 COCAINE ABUSE (HCC): ICD-10-CM

## 2021-10-20 DIAGNOSIS — M25.561 CHRONIC PAIN OF RIGHT KNEE: ICD-10-CM

## 2021-10-20 DIAGNOSIS — F17.200 SMOKER: ICD-10-CM

## 2021-10-20 DIAGNOSIS — E11.29 TYPE 2 DIABETES MELLITUS WITH MICROALBUMINURIA, WITH LONG-TERM CURRENT USE OF INSULIN (HCC): ICD-10-CM

## 2021-10-20 DIAGNOSIS — J40 BRONCHITIS: ICD-10-CM

## 2021-10-20 DIAGNOSIS — R80.9 TYPE 2 DIABETES MELLITUS WITH MICROALBUMINURIA, WITH LONG-TERM CURRENT USE OF INSULIN (HCC): ICD-10-CM

## 2021-10-20 DIAGNOSIS — E78.2 MIXED HYPERLIPIDEMIA: ICD-10-CM

## 2021-10-20 DIAGNOSIS — B35.9 RINGWORM: ICD-10-CM

## 2021-10-20 DIAGNOSIS — R06.2 WHEEZING: ICD-10-CM

## 2021-10-20 DIAGNOSIS — E55.9 VITAMIN D DEFICIENCY: ICD-10-CM

## 2021-10-20 DIAGNOSIS — M54.2 NECK PAIN: ICD-10-CM

## 2021-10-20 DIAGNOSIS — E78.1 HYPERTRIGLYCERIDEMIA: ICD-10-CM

## 2021-10-20 DIAGNOSIS — Z79.4 TYPE 2 DIABETES MELLITUS WITH MICROALBUMINURIA, WITH LONG-TERM CURRENT USE OF INSULIN (HCC): ICD-10-CM

## 2021-10-20 DIAGNOSIS — J45.40 MODERATE PERSISTENT ASTHMA, UNSPECIFIED WHETHER COMPLICATED: ICD-10-CM

## 2021-10-20 DIAGNOSIS — I10 ESSENTIAL HYPERTENSION: ICD-10-CM

## 2021-10-20 DIAGNOSIS — L60.0 INGROWING NAIL, RIGHT GREAT TOE: ICD-10-CM

## 2021-10-20 DIAGNOSIS — R63.1 POLYDIPSIA: ICD-10-CM

## 2021-10-20 DIAGNOSIS — E87.6 HYPOKALEMIA: ICD-10-CM

## 2021-10-20 DIAGNOSIS — G89.29 CHRONIC BILATERAL LOW BACK PAIN WITH RIGHT-SIDED SCIATICA: ICD-10-CM

## 2021-10-20 DIAGNOSIS — R21 RASH: ICD-10-CM

## 2021-10-20 DIAGNOSIS — G89.29 CHRONIC PAIN OF RIGHT KNEE: ICD-10-CM

## 2021-10-20 DIAGNOSIS — G89.29 OTHER CHRONIC PAIN: ICD-10-CM

## 2021-10-20 DIAGNOSIS — F11.21 OPIOID DEPENDENCE IN REMISSION (HCC): ICD-10-CM

## 2021-10-20 DIAGNOSIS — M54.41 CHRONIC BILATERAL LOW BACK PAIN WITH RIGHT-SIDED SCIATICA: ICD-10-CM

## 2021-10-20 DIAGNOSIS — B35.3 TINEA PEDIS OF LEFT FOOT: ICD-10-CM

## 2021-10-20 RX ORDER — ATORVASTATIN CALCIUM 20 MG/1
20 TABLET, FILM COATED ORAL DAILY
Qty: 30 TABLET | Refills: 3 | Status: SHIPPED | OUTPATIENT
Start: 2021-10-20 | End: 2021-11-12

## 2021-10-26 DIAGNOSIS — Z79.4 TYPE 2 DIABETES MELLITUS WITH MICROALBUMINURIA, WITH LONG-TERM CURRENT USE OF INSULIN (HCC): ICD-10-CM

## 2021-10-26 DIAGNOSIS — R06.2 WHEEZING: ICD-10-CM

## 2021-10-26 DIAGNOSIS — R35.89 POLYURIA: ICD-10-CM

## 2021-10-26 DIAGNOSIS — G89.29 CHRONIC BILATERAL LOW BACK PAIN WITH RIGHT-SIDED SCIATICA: ICD-10-CM

## 2021-10-26 DIAGNOSIS — R21 RASH: ICD-10-CM

## 2021-10-26 DIAGNOSIS — R63.1 POLYDIPSIA: ICD-10-CM

## 2021-10-26 DIAGNOSIS — J40 BRONCHITIS: ICD-10-CM

## 2021-10-26 DIAGNOSIS — E78.1 HYPERTRIGLYCERIDEMIA: ICD-10-CM

## 2021-10-26 DIAGNOSIS — L60.0 INGROWING NAIL, RIGHT GREAT TOE: ICD-10-CM

## 2021-10-26 DIAGNOSIS — M54.41 CHRONIC BILATERAL LOW BACK PAIN WITH RIGHT-SIDED SCIATICA: ICD-10-CM

## 2021-10-26 DIAGNOSIS — B35.3 TINEA PEDIS OF LEFT FOOT: ICD-10-CM

## 2021-10-26 DIAGNOSIS — F17.200 SMOKER: ICD-10-CM

## 2021-10-26 DIAGNOSIS — G89.29 CHRONIC PAIN OF RIGHT KNEE: ICD-10-CM

## 2021-10-26 DIAGNOSIS — E87.6 HYPOKALEMIA: ICD-10-CM

## 2021-10-26 DIAGNOSIS — E55.9 VITAMIN D DEFICIENCY: ICD-10-CM

## 2021-10-26 DIAGNOSIS — I10 ESSENTIAL HYPERTENSION: ICD-10-CM

## 2021-10-26 DIAGNOSIS — G89.29 OTHER CHRONIC PAIN: ICD-10-CM

## 2021-10-26 DIAGNOSIS — M54.2 NECK PAIN: ICD-10-CM

## 2021-10-26 DIAGNOSIS — F14.10 COCAINE ABUSE (HCC): ICD-10-CM

## 2021-10-26 DIAGNOSIS — F11.21 OPIOID DEPENDENCE IN REMISSION (HCC): ICD-10-CM

## 2021-10-26 DIAGNOSIS — B35.9 RINGWORM: ICD-10-CM

## 2021-10-26 DIAGNOSIS — M25.561 CHRONIC PAIN OF RIGHT KNEE: ICD-10-CM

## 2021-10-26 DIAGNOSIS — J45.40 MODERATE PERSISTENT ASTHMA, UNSPECIFIED WHETHER COMPLICATED: ICD-10-CM

## 2021-10-26 DIAGNOSIS — E11.29 TYPE 2 DIABETES MELLITUS WITH MICROALBUMINURIA, WITH LONG-TERM CURRENT USE OF INSULIN (HCC): ICD-10-CM

## 2021-10-26 DIAGNOSIS — R80.9 TYPE 2 DIABETES MELLITUS WITH MICROALBUMINURIA, WITH LONG-TERM CURRENT USE OF INSULIN (HCC): ICD-10-CM

## 2021-10-26 RX ORDER — LOSARTAN POTASSIUM 100 MG/1
100 TABLET ORAL DAILY
Qty: 30 TABLET | Refills: 3 | Status: SHIPPED | OUTPATIENT
Start: 2021-10-26 | End: 2022-02-25

## 2021-10-26 RX ORDER — HYDROCHLOROTHIAZIDE 25 MG/1
25 TABLET ORAL EVERY MORNING
Qty: 30 TABLET | Refills: 0 | Status: SHIPPED | OUTPATIENT
Start: 2021-10-26 | End: 2021-11-24

## 2021-10-26 RX ORDER — CHOLECALCIFEROL (VITAMIN D3) 125 MCG
CAPSULE ORAL
Qty: 30 TABLET | Refills: 1 | Status: SHIPPED | OUTPATIENT
Start: 2021-10-26 | End: 2022-01-25

## 2021-10-26 NOTE — TELEPHONE ENCOUNTER
Eileen Guillen is requesting a refill on the following medication(s):  Requested Prescriptions     Pending Prescriptions Disp Refills    losartan (COZAAR) 100 MG tablet [Pharmacy Med Name: Losartan Potassium 100MG TABS] 30 tablet 3     Sig: TAKE 1 TABLET BY MOUTH DAILY    Cholecalciferol (VITAMIN D3) 50 MCG (2000 UT) TABS [Pharmacy Med Name: Vitamin D3 Super Strength 50 MCG(2000 UT) TABS] 30 tablet 1     Sig: TAKE 1 TABLET BY MOUTH DAILY    hydroCHLOROthiazide (HYDRODIURIL) 25 MG tablet [Pharmacy Med Name: hydroCHLOROthiazide 25MG TABS*] 30 tablet 0     Sig: TAKE 1 TABLET BY MOUTH EVERY MORNING       Last Visit Date (If Applicable):  05/63/8077    Next Visit Date:    11/11/2021

## 2021-11-03 DIAGNOSIS — I10 ESSENTIAL HYPERTENSION: ICD-10-CM

## 2021-11-03 DIAGNOSIS — J40 BRONCHITIS: ICD-10-CM

## 2021-11-03 DIAGNOSIS — E55.9 VITAMIN D DEFICIENCY: ICD-10-CM

## 2021-11-03 DIAGNOSIS — B35.9 RINGWORM: ICD-10-CM

## 2021-11-03 DIAGNOSIS — E11.29 TYPE 2 DIABETES MELLITUS WITH MICROALBUMINURIA, WITH LONG-TERM CURRENT USE OF INSULIN (HCC): ICD-10-CM

## 2021-11-03 DIAGNOSIS — M25.561 CHRONIC PAIN OF RIGHT KNEE: ICD-10-CM

## 2021-11-03 DIAGNOSIS — G89.29 CHRONIC PAIN OF RIGHT KNEE: ICD-10-CM

## 2021-11-03 DIAGNOSIS — M54.2 NECK PAIN: ICD-10-CM

## 2021-11-03 DIAGNOSIS — G89.29 CHRONIC BILATERAL LOW BACK PAIN WITH RIGHT-SIDED SCIATICA: ICD-10-CM

## 2021-11-03 DIAGNOSIS — F11.21 OPIOID DEPENDENCE IN REMISSION (HCC): ICD-10-CM

## 2021-11-03 DIAGNOSIS — J45.40 MODERATE PERSISTENT ASTHMA, UNSPECIFIED WHETHER COMPLICATED: ICD-10-CM

## 2021-11-03 DIAGNOSIS — R06.2 WHEEZING: ICD-10-CM

## 2021-11-03 DIAGNOSIS — R35.89 POLYURIA: ICD-10-CM

## 2021-11-03 DIAGNOSIS — M54.41 CHRONIC BILATERAL LOW BACK PAIN WITH RIGHT-SIDED SCIATICA: ICD-10-CM

## 2021-11-03 DIAGNOSIS — E87.6 HYPOKALEMIA: ICD-10-CM

## 2021-11-03 DIAGNOSIS — B35.3 TINEA PEDIS OF LEFT FOOT: ICD-10-CM

## 2021-11-03 DIAGNOSIS — G89.29 OTHER CHRONIC PAIN: ICD-10-CM

## 2021-11-03 DIAGNOSIS — R80.9 TYPE 2 DIABETES MELLITUS WITH MICROALBUMINURIA, WITH LONG-TERM CURRENT USE OF INSULIN (HCC): ICD-10-CM

## 2021-11-03 DIAGNOSIS — F14.10 COCAINE ABUSE (HCC): ICD-10-CM

## 2021-11-03 DIAGNOSIS — E78.1 HYPERTRIGLYCERIDEMIA: ICD-10-CM

## 2021-11-03 DIAGNOSIS — R21 RASH: ICD-10-CM

## 2021-11-03 DIAGNOSIS — R63.1 POLYDIPSIA: ICD-10-CM

## 2021-11-03 DIAGNOSIS — L60.0 INGROWING NAIL, RIGHT GREAT TOE: ICD-10-CM

## 2021-11-03 DIAGNOSIS — F17.200 SMOKER: ICD-10-CM

## 2021-11-03 DIAGNOSIS — Z79.4 TYPE 2 DIABETES MELLITUS WITH MICROALBUMINURIA, WITH LONG-TERM CURRENT USE OF INSULIN (HCC): ICD-10-CM

## 2021-11-03 DIAGNOSIS — E78.2 MIXED HYPERLIPIDEMIA: ICD-10-CM

## 2021-11-03 RX ORDER — POTASSIUM CHLORIDE 20 MEQ/1
20 TABLET, EXTENDED RELEASE ORAL DAILY
Qty: 30 TABLET | Refills: 1 | Status: SHIPPED | OUTPATIENT
Start: 2021-11-03 | End: 2022-01-05

## 2021-11-03 RX ORDER — DULAGLUTIDE 1.5 MG/.5ML
INJECTION, SOLUTION SUBCUTANEOUS
Qty: 2 ML | Refills: 3 | Status: SHIPPED | OUTPATIENT
Start: 2021-11-03 | End: 2022-02-22 | Stop reason: SDUPTHER

## 2021-11-03 RX ORDER — BUDESONIDE AND FORMOTEROL FUMARATE DIHYDRATE 160; 4.5 UG/1; UG/1
AEROSOL RESPIRATORY (INHALATION)
Qty: 10.2 G | Refills: 0 | Status: SHIPPED | OUTPATIENT
Start: 2021-11-03 | End: 2021-12-09

## 2021-11-03 NOTE — TELEPHONE ENCOUNTER
Beka Raines is requesting a refill on the following medication(s):  Requested Prescriptions     Pending Prescriptions Disp Refills    TRULICITY 1.5 HE/8.1OL SOPN [Pharmacy Med Name: Trulicity 9.8HE/1.3QB SOPN] 2 mL 3     Sig: INJECT 1.5MG SUBCUTANEOUSLY ONCE EVERY WEEK    potassium chloride (KLOR-CON M) 20 MEQ extended release tablet [Pharmacy Med Name: Potassium Chloride [place] ER 20MEQ TBCR] 30 tablet 1     Sig: TAKE 1 TABLET BY MOUTH DAILY       Last Visit Date (If Applicable):  54/91/9684    Next Visit Date:    11/3/2021

## 2021-11-03 NOTE — TELEPHONE ENCOUNTER
Ivy Haddad is requesting a refill on the following medication(s):  Requested Prescriptions     Pending Prescriptions Disp Refills    SYMBICORT 160-4.5 MCG/ACT AERO [Pharmacy Med Name: Symbicort 160-4. 5MCG/ACT AERO] 10.2 g 0     Sig: INHALE 2 PUFFS BY MOUTH TWICE A DAY       Last Visit Date (If Applicable):  98/61/3826    Next Visit Date:    11/11/2021

## 2021-11-08 ENCOUNTER — TELEPHONE (OUTPATIENT)
Dept: FAMILY MEDICINE CLINIC | Age: 46
End: 2021-11-08

## 2021-11-08 DIAGNOSIS — R21 RASH: Primary | ICD-10-CM

## 2021-11-08 NOTE — TELEPHONE ENCOUNTER
----- Message from Barnana sent at 11/8/2021  2:11 PM EST -----  Subject: Referral Request    QUESTIONS   Reason for referral request? Patient needs another referral to a different   dermatologist. The last one he was referred to did not accept his   insurance. Asking for a new referral.   Has the physician seen you for this condition before? No   Preferred Specialist (if applicable)? Do you already have an appointment scheduled? No  Additional Information for Provider?   ---------------------------------------------------------------------------  --------------  CALL BACK INFO  What is the best way for the office to contact you? OK to leave message on   voicemail  Preferred Call Back Phone Number?  [phone]

## 2021-11-11 DIAGNOSIS — M54.41 CHRONIC BILATERAL LOW BACK PAIN WITH RIGHT-SIDED SCIATICA: ICD-10-CM

## 2021-11-11 DIAGNOSIS — E11.29 TYPE 2 DIABETES MELLITUS WITH MICROALBUMINURIA, WITH LONG-TERM CURRENT USE OF INSULIN (HCC): ICD-10-CM

## 2021-11-11 DIAGNOSIS — E78.1 HYPERTRIGLYCERIDEMIA: ICD-10-CM

## 2021-11-11 DIAGNOSIS — B35.3 TINEA PEDIS OF LEFT FOOT: ICD-10-CM

## 2021-11-11 DIAGNOSIS — E87.6 HYPOKALEMIA: ICD-10-CM

## 2021-11-11 DIAGNOSIS — R06.2 WHEEZING: ICD-10-CM

## 2021-11-11 DIAGNOSIS — G89.29 CHRONIC PAIN OF RIGHT KNEE: ICD-10-CM

## 2021-11-11 DIAGNOSIS — F17.200 SMOKER: ICD-10-CM

## 2021-11-11 DIAGNOSIS — Z79.4 TYPE 2 DIABETES MELLITUS WITH MICROALBUMINURIA, WITH LONG-TERM CURRENT USE OF INSULIN (HCC): ICD-10-CM

## 2021-11-11 DIAGNOSIS — M25.561 CHRONIC PAIN OF RIGHT KNEE: ICD-10-CM

## 2021-11-11 DIAGNOSIS — F11.21 OPIOID DEPENDENCE IN REMISSION (HCC): ICD-10-CM

## 2021-11-11 DIAGNOSIS — J40 BRONCHITIS: ICD-10-CM

## 2021-11-11 DIAGNOSIS — B35.9 RINGWORM: ICD-10-CM

## 2021-11-11 DIAGNOSIS — R63.1 POLYDIPSIA: ICD-10-CM

## 2021-11-11 DIAGNOSIS — F14.10 COCAINE ABUSE (HCC): ICD-10-CM

## 2021-11-11 DIAGNOSIS — E55.9 VITAMIN D DEFICIENCY: ICD-10-CM

## 2021-11-11 DIAGNOSIS — I10 ESSENTIAL HYPERTENSION: ICD-10-CM

## 2021-11-11 DIAGNOSIS — L60.0 INGROWING NAIL, RIGHT GREAT TOE: ICD-10-CM

## 2021-11-11 DIAGNOSIS — J45.40 MODERATE PERSISTENT ASTHMA, UNSPECIFIED WHETHER COMPLICATED: ICD-10-CM

## 2021-11-11 DIAGNOSIS — G89.29 OTHER CHRONIC PAIN: ICD-10-CM

## 2021-11-11 DIAGNOSIS — R21 RASH: ICD-10-CM

## 2021-11-11 DIAGNOSIS — G89.29 CHRONIC BILATERAL LOW BACK PAIN WITH RIGHT-SIDED SCIATICA: ICD-10-CM

## 2021-11-11 DIAGNOSIS — R80.9 TYPE 2 DIABETES MELLITUS WITH MICROALBUMINURIA, WITH LONG-TERM CURRENT USE OF INSULIN (HCC): ICD-10-CM

## 2021-11-11 DIAGNOSIS — R35.89 POLYURIA: ICD-10-CM

## 2021-11-11 DIAGNOSIS — M54.2 NECK PAIN: ICD-10-CM

## 2021-11-11 DIAGNOSIS — E78.2 MIXED HYPERLIPIDEMIA: ICD-10-CM

## 2021-11-11 DIAGNOSIS — E66.01 CLASS 3 SEVERE OBESITY DUE TO EXCESS CALORIES WITH SERIOUS COMORBIDITY AND BODY MASS INDEX (BMI) OF 40.0 TO 44.9 IN ADULT (HCC): ICD-10-CM

## 2021-11-11 RX ORDER — PHENTERMINE HYDROCHLORIDE 37.5 MG/1
37.5 CAPSULE ORAL EVERY MORNING
Qty: 30 CAPSULE | Refills: 0 | Status: SHIPPED | OUTPATIENT
Start: 2021-11-11 | End: 2021-12-11

## 2021-11-12 RX ORDER — ATORVASTATIN CALCIUM 20 MG/1
20 TABLET, FILM COATED ORAL DAILY
Qty: 30 TABLET | Refills: 3 | Status: SHIPPED | OUTPATIENT
Start: 2021-11-12 | End: 2022-04-12 | Stop reason: SDUPTHER

## 2021-11-12 NOTE — TELEPHONE ENCOUNTER
Aisha Kelly is requesting a refill on the following medication(s):  Requested Prescriptions     Pending Prescriptions Disp Refills    atorvastatin (LIPITOR) 20 MG tablet [Pharmacy Med Name: Atorvastatin Calcium 20MG TABS] 30 tablet 3     Sig: TAKE 1 TABLET BY MOUTH DAILY       Last Visit Date (If Applicable):  15/60/8859    Next Visit Date:    11/16/2021

## 2021-11-18 DIAGNOSIS — L60.0 INGROWING NAIL, RIGHT GREAT TOE: ICD-10-CM

## 2021-11-18 DIAGNOSIS — R35.89 POLYURIA: ICD-10-CM

## 2021-11-18 DIAGNOSIS — J40 BRONCHITIS: ICD-10-CM

## 2021-11-18 DIAGNOSIS — E55.9 VITAMIN D DEFICIENCY: ICD-10-CM

## 2021-11-18 DIAGNOSIS — B35.3 TINEA PEDIS OF LEFT FOOT: ICD-10-CM

## 2021-11-18 DIAGNOSIS — M25.561 CHRONIC PAIN OF RIGHT KNEE: ICD-10-CM

## 2021-11-18 DIAGNOSIS — E78.1 HYPERTRIGLYCERIDEMIA: ICD-10-CM

## 2021-11-18 DIAGNOSIS — B35.9 RINGWORM: ICD-10-CM

## 2021-11-18 DIAGNOSIS — E78.2 MIXED HYPERLIPIDEMIA: ICD-10-CM

## 2021-11-18 DIAGNOSIS — M54.2 NECK PAIN: ICD-10-CM

## 2021-11-18 DIAGNOSIS — R63.1 POLYDIPSIA: ICD-10-CM

## 2021-11-18 DIAGNOSIS — G89.29 CHRONIC PAIN OF RIGHT KNEE: ICD-10-CM

## 2021-11-18 DIAGNOSIS — F17.200 SMOKER: ICD-10-CM

## 2021-11-18 DIAGNOSIS — R80.9 TYPE 2 DIABETES MELLITUS WITH MICROALBUMINURIA, WITH LONG-TERM CURRENT USE OF INSULIN (HCC): ICD-10-CM

## 2021-11-18 DIAGNOSIS — G89.29 CHRONIC BILATERAL LOW BACK PAIN WITH RIGHT-SIDED SCIATICA: ICD-10-CM

## 2021-11-18 DIAGNOSIS — Z79.4 TYPE 2 DIABETES MELLITUS WITH MICROALBUMINURIA, WITH LONG-TERM CURRENT USE OF INSULIN (HCC): ICD-10-CM

## 2021-11-18 DIAGNOSIS — E11.29 TYPE 2 DIABETES MELLITUS WITH MICROALBUMINURIA, WITH LONG-TERM CURRENT USE OF INSULIN (HCC): ICD-10-CM

## 2021-11-18 DIAGNOSIS — R06.2 WHEEZING: ICD-10-CM

## 2021-11-18 DIAGNOSIS — G89.29 OTHER CHRONIC PAIN: ICD-10-CM

## 2021-11-18 DIAGNOSIS — E87.6 HYPOKALEMIA: ICD-10-CM

## 2021-11-18 DIAGNOSIS — J45.40 MODERATE PERSISTENT ASTHMA, UNSPECIFIED WHETHER COMPLICATED: ICD-10-CM

## 2021-11-18 DIAGNOSIS — R21 RASH: ICD-10-CM

## 2021-11-18 DIAGNOSIS — F11.21 OPIOID DEPENDENCE IN REMISSION (HCC): ICD-10-CM

## 2021-11-18 DIAGNOSIS — F14.10 COCAINE ABUSE (HCC): ICD-10-CM

## 2021-11-18 DIAGNOSIS — M54.41 CHRONIC BILATERAL LOW BACK PAIN WITH RIGHT-SIDED SCIATICA: ICD-10-CM

## 2021-11-18 DIAGNOSIS — I10 ESSENTIAL HYPERTENSION: ICD-10-CM

## 2021-11-22 RX ORDER — EZETIMIBE 10 MG/1
10 TABLET ORAL DAILY
Qty: 30 TABLET | Refills: 3 | Status: SHIPPED | OUTPATIENT
Start: 2021-11-22 | End: 2022-03-08

## 2021-11-30 ENCOUNTER — OFFICE VISIT (OUTPATIENT)
Dept: FAMILY MEDICINE CLINIC | Age: 46
End: 2021-11-30
Payer: COMMERCIAL

## 2021-11-30 VITALS
TEMPERATURE: 96.4 F | WEIGHT: 296.8 LBS | OXYGEN SATURATION: 96 % | BODY MASS INDEX: 41.4 KG/M2 | SYSTOLIC BLOOD PRESSURE: 138 MMHG | HEART RATE: 96 BPM | DIASTOLIC BLOOD PRESSURE: 94 MMHG

## 2021-11-30 DIAGNOSIS — E11.29 TYPE 2 DIABETES MELLITUS WITH MICROALBUMINURIA, WITH LONG-TERM CURRENT USE OF INSULIN (HCC): Primary | ICD-10-CM

## 2021-11-30 DIAGNOSIS — Z79.4 TYPE 2 DIABETES MELLITUS WITH MICROALBUMINURIA, WITH LONG-TERM CURRENT USE OF INSULIN (HCC): Primary | ICD-10-CM

## 2021-11-30 DIAGNOSIS — R80.9 TYPE 2 DIABETES MELLITUS WITH MICROALBUMINURIA, WITH LONG-TERM CURRENT USE OF INSULIN (HCC): Primary | ICD-10-CM

## 2021-11-30 DIAGNOSIS — E66.01 CLASS 2 SEVERE OBESITY DUE TO EXCESS CALORIES WITH SERIOUS COMORBIDITY AND BODY MASS INDEX (BMI) OF 35.0 TO 35.9 IN ADULT (HCC): ICD-10-CM

## 2021-11-30 PROCEDURE — 99213 OFFICE O/P EST LOW 20 MIN: CPT

## 2021-11-30 PROCEDURE — 2022F DILAT RTA XM EVC RTNOPTHY: CPT | Performed by: INTERNAL MEDICINE

## 2021-11-30 PROCEDURE — G8484 FLU IMMUNIZE NO ADMIN: HCPCS | Performed by: INTERNAL MEDICINE

## 2021-11-30 PROCEDURE — G8427 DOCREV CUR MEDS BY ELIG CLIN: HCPCS | Performed by: INTERNAL MEDICINE

## 2021-11-30 PROCEDURE — 4004F PT TOBACCO SCREEN RCVD TLK: CPT | Performed by: INTERNAL MEDICINE

## 2021-11-30 PROCEDURE — G8417 CALC BMI ABV UP PARAM F/U: HCPCS | Performed by: INTERNAL MEDICINE

## 2021-11-30 PROCEDURE — 3044F HG A1C LEVEL LT 7.0%: CPT | Performed by: INTERNAL MEDICINE

## 2021-11-30 PROCEDURE — 99214 OFFICE O/P EST MOD 30 MIN: CPT | Performed by: INTERNAL MEDICINE

## 2021-11-30 ASSESSMENT — ENCOUNTER SYMPTOMS
SHORTNESS OF BREATH: 0
SINUS PAIN: 0
ABDOMINAL PAIN: 0
BLOOD IN STOOL: 0
RHINORRHEA: 0
TROUBLE SWALLOWING: 0
CHEST TIGHTNESS: 0
SORE THROAT: 0
DIARRHEA: 0
COUGH: 0

## 2021-11-30 NOTE — PROGRESS NOTES
[address]  [address] [place] [address]  Dept: [phone]  Dept Fax: [phone]  Loc: [phone]     Visit Date:  11/30/2021    Patient:  Isael Carey  YOB: 1975    HPI:   Isael Carey presents today for   Chief Complaint   Patient presents with    1 Month Follow-Up   . HPI 41year old male is coming in for 1 month follow up. No weight loss since last seen. On Phentermine. No side effects with the medicine. Lost some weight last month. Just been busy working and not exercising much. Taking all medications religiously but not checking blood sugar consistently. Medications  Prior to Visit Medications    Medication Sig Taking? Authorizing Provider   hydroCHLOROthiazide (HYDRODIURIL) 25 MG tablet TAKE 1 TABLET BY MOUTH EVERY MORNING Yes Emilee Jarquin MD   ezetimibe (ZETIA) 10 MG tablet TAKE 1 TABLET BY MOUTH DAILY Yes Emilee Jarquin MD   atorvastatin (LIPITOR) 20 MG tablet TAKE 1 TABLET BY MOUTH DAILY Yes Emilee Jarquin MD   phentermine (ADIPEX-P) 37.5 MG capsule Take 1 capsule by mouth every morning for 30 days.  Yes Emilee Jarquin MD   TRULICITY 1.5 KK/6.1PC SOPN INJECT 1.5MG SUBCUTANEOUSLY ONCE EVERY WEEK Yes Emilee Jarquin MD   potassium chloride (KLOR-CON M) 20 MEQ extended release tablet TAKE 1 TABLET BY MOUTH DAILY Yes Emilee Jarquin MD   SYMBICORT 160-4.5 MCG/ACT AERO INHALE 2 PUFFS BY MOUTH TWICE A DAY Yes Emilee Jarquin MD   losartan (COZAAR) 100 MG tablet TAKE 1 TABLET BY MOUTH DAILY Yes Emilee Jarquin MD   Cholecalciferol (VITAMIN D3) 50 MCG (2000 UT) TABS TAKE 1 TABLET BY MOUTH DAILY Yes Emilee Jarquin MD   metFORMIN (GLUCOPHAGE) 1000 MG tablet TAKE 1 TABLET BY MOUTH TWICE A DAY WITH MEALS Yes Emilee Jarquin MD   LANTUS SOLOSTAR 100 UNIT/ML injection pen INJECT 25 UNITS SUBCUTANEOUSLY TWICE A DAY Yes Emilee Jarquin MD   Insulin Pen Needle (ULTICARE SHORT PEN NEEDLES) 31G X 8 MM MISC USE WITH LANTUS NIGHTLY Yes Ileana Steele MD   metoprolol succinate (TOPROL XL) 50 MG extended release tablet Take 1 tablet by mouth daily Yes Ileana Steele MD   Blood Pressure Monitoring (B-D ASSURE BPM/MANUAL ARM CUFF) MISC 1 applicator by Does not apply route 2 times daily Check BP twice daily Yes Ileana Steele MD   naproxen (NAPROSYN) 500 MG tablet Take 1 tablet by mouth 2 times daily as needed for Pain Yes Ileana Steele MD   hydrOXYzine (ATARAX) 25 MG tablet  Yes Historical Provider, MD   blood glucose monitor kit and supplies Dispense all needed supplies to include: monitor, strips, lancing device, lancets, control solutions, alcohol swabs. Yes Ileana Steele MD   Lancets MISC 1 each by Does not apply route 3 times daily Yes Ileana Steele MD   blood glucose monitor strips Test 3 times a day & as needed for symptoms of irregular blood glucose. Dispense sufficient amount for indicated testing frequency. Yes Ileana Steele MD   albuterol sulfate  (90 Base) MCG/ACT inhaler Inhale 2 puffs into the lungs every 4 hours as needed for Wheezing or Shortness of Breath (cough) Yes Ileana Steele MD   hydrOXYzine (VISTARIL) 50 MG capsule take 1 capsule by mouth at bedtime Yes Historical Provider, MD   OXcarbazepine (TRILEPTAL) 300 MG tablet Take 1 tablet by mouth daily Yes Historical Provider, MD   prazosin (MINIPRESS) 2 MG capsule Take 2 capsules by mouth daily  Yes Historical Provider, MD   glucose 4 g chewable tablet Take 4 tablets by mouth as needed for Low blood sugar  Patient not taking: Reported on 10/11/2021  Ileana Steele MD        Allergies:  is allergic to amoxicillin, other, and pcn [penicillins]. Past Medical History:   has a past medical history of Bipolar 1 disorder ([facility] [address] History of heroin abuse ([facility] [address] Hypertension, and Personality disorder ([facility] [address]     Past Surgical History   has a past surgical history that includes knee surgery (Left) and Finger surgery (Left). Family History  family history includes Cancer in his father; Diabetes in his mother; Heart Disease in his father; High Blood Pressure in his father and mother; Kidney Disease in his mother. Social History   reports that he has been smoking cigarettes. He uses smokeless tobacco. He reports previous alcohol use. Health Maintenance:    Health Maintenance   Topic Date Due    Hepatitis C screen  Never done    COVID-19 Vaccine (1) Never done    Pneumococcal 0-64 years Vaccine (1 of 2 - PPSV23) Never done    Diabetic foot exam  Never done    Diabetic retinal exam  Never done    HIV screen  Never done    Hepatitis B vaccine (1 of 3 - Risk 3-dose series) Never done    DTaP/Tdap/Td vaccine (1 - Tdap) 09/16/2014    Colon cancer screen colonoscopy  Never done    Flu vaccine (1) Never done    Lipid screen  03/22/2022    Potassium monitoring  03/22/2022    Creatinine monitoring  03/22/2022    A1C test (Diabetic or Prediabetic)  10/11/2022    Hepatitis A vaccine  Aged Out    Hib vaccine  Aged Out    Meningococcal (ACWY) vaccine  Aged Out       Subjective:      Review of Systems   Constitutional: Negative for fatigue, fever and unexpected weight change. HENT: Negative for ear pain, postnasal drip, rhinorrhea, sinus pain, sore throat and trouble swallowing. Eyes: Negative for visual disturbance. Respiratory: Negative for cough, chest tightness and shortness of breath. Cardiovascular: Negative for chest pain and leg swelling. Gastrointestinal: Negative for abdominal pain, blood in stool and diarrhea. Endocrine: Negative for polyuria. Genitourinary: Negative for difficulty urinating and flank pain. Musculoskeletal: Negative for arthralgias, joint swelling and myalgias. Skin: Negative for rash. Allergic/Immunologic: Negative for environmental allergies. Neurological: Negative for weakness, light-headedness, numbness and headaches.    Hematological: Negative for adenopathy. Psychiatric/Behavioral: Negative for behavioral problems and suicidal ideas. The patient is not nervous/anxious. Objective:     BP (!) 138/94 (Site: Right Upper Arm, Position: Sitting, Cuff Size: Large Adult)   Pulse 96   Temp 96.4 °F (35.8 °C)   Wt 296 lb 12.8 oz (134.6 kg)   SpO2 96%   BMI 41.40 kg/m²     Physical Exam  Vitals and nursing note reviewed. HENT:      Head: Normocephalic and atraumatic. Cardiovascular:      Rate and Rhythm: Normal rate and regular rhythm. Pulses: Normal pulses. Heart sounds: Normal heart sounds. Pulmonary:      Effort: Pulmonary effort is normal.      Breath sounds: Normal breath sounds. No stridor. Abdominal:      General: Abdomen is flat. Bowel sounds are normal.      Palpations: Abdomen is soft. Musculoskeletal:         General: Normal range of motion. Skin:     General: Skin is warm. Neurological:      Mental Status: He is oriented to person, place, and time. Mental status is at baseline. Psychiatric:         Mood and Affect: Mood normal.             Assessment       Diagnosis Orders   1. Type 2 diabetes mellitus with microalbuminuria, with long-term current use of insulin (HCC)     2. Class 2 severe obesity due to excess calories with serious comorbidity and body mass index (BMI) of 35.0 to 35.9 in adult [facility])           PLAN   Educated on weight loss/low carb low fat diet/lifestyle good habits. Lifestyle modification strategies that include diet and exercise remain the foundation of any long-term weight management plan. 1 lb weight loss per week should be the best strategy to loose weight. No orders of the defined types were placed in this encounter. No follow-ups on file. Patient given educational materials - see patient instructions. Discussed use, benefit, and side effects of prescribed medications. All patient questions answered. Pt voiced understanding. Reviewed health maintenance. Electronically signed Destiny James MD on 11/30/2021 at 4:20 PM EST

## 2021-12-02 DIAGNOSIS — M25.561 CHRONIC PAIN OF RIGHT KNEE: ICD-10-CM

## 2021-12-02 DIAGNOSIS — G89.29 CHRONIC PAIN OF RIGHT KNEE: ICD-10-CM

## 2021-12-02 DIAGNOSIS — E78.1 HYPERTRIGLYCERIDEMIA: ICD-10-CM

## 2021-12-02 DIAGNOSIS — M54.2 NECK PAIN: ICD-10-CM

## 2021-12-02 DIAGNOSIS — B35.3 TINEA PEDIS OF LEFT FOOT: ICD-10-CM

## 2021-12-02 DIAGNOSIS — E11.29 TYPE 2 DIABETES MELLITUS WITH MICROALBUMINURIA, WITH LONG-TERM CURRENT USE OF INSULIN (HCC): ICD-10-CM

## 2021-12-02 DIAGNOSIS — J40 BRONCHITIS: ICD-10-CM

## 2021-12-02 DIAGNOSIS — R21 RASH: ICD-10-CM

## 2021-12-02 DIAGNOSIS — E55.9 VITAMIN D DEFICIENCY: ICD-10-CM

## 2021-12-02 DIAGNOSIS — F11.21 OPIOID DEPENDENCE IN REMISSION (HCC): ICD-10-CM

## 2021-12-02 DIAGNOSIS — L60.0 INGROWING NAIL, RIGHT GREAT TOE: ICD-10-CM

## 2021-12-02 DIAGNOSIS — M54.41 CHRONIC BILATERAL LOW BACK PAIN WITH RIGHT-SIDED SCIATICA: ICD-10-CM

## 2021-12-02 DIAGNOSIS — G89.29 OTHER CHRONIC PAIN: ICD-10-CM

## 2021-12-02 DIAGNOSIS — G89.29 CHRONIC BILATERAL LOW BACK PAIN WITH RIGHT-SIDED SCIATICA: ICD-10-CM

## 2021-12-02 DIAGNOSIS — R63.1 POLYDIPSIA: ICD-10-CM

## 2021-12-02 DIAGNOSIS — R06.2 WHEEZING: ICD-10-CM

## 2021-12-02 DIAGNOSIS — E87.6 HYPOKALEMIA: ICD-10-CM

## 2021-12-02 DIAGNOSIS — B35.9 RINGWORM: ICD-10-CM

## 2021-12-02 DIAGNOSIS — I10 ESSENTIAL HYPERTENSION: ICD-10-CM

## 2021-12-02 DIAGNOSIS — F17.200 SMOKER: ICD-10-CM

## 2021-12-02 DIAGNOSIS — Z79.4 TYPE 2 DIABETES MELLITUS WITH MICROALBUMINURIA, WITH LONG-TERM CURRENT USE OF INSULIN (HCC): ICD-10-CM

## 2021-12-02 DIAGNOSIS — F14.10 COCAINE ABUSE (HCC): ICD-10-CM

## 2021-12-02 DIAGNOSIS — R35.89 POLYURIA: ICD-10-CM

## 2021-12-02 DIAGNOSIS — R80.9 TYPE 2 DIABETES MELLITUS WITH MICROALBUMINURIA, WITH LONG-TERM CURRENT USE OF INSULIN (HCC): ICD-10-CM

## 2021-12-02 DIAGNOSIS — J45.40 MODERATE PERSISTENT ASTHMA, UNSPECIFIED WHETHER COMPLICATED: ICD-10-CM

## 2021-12-09 RX ORDER — INSULIN GLARGINE 100 [IU]/ML
INJECTION, SOLUTION SUBCUTANEOUS
Qty: 15 ML | Refills: 3 | Status: SHIPPED | OUTPATIENT
Start: 2021-12-09 | End: 2022-03-31

## 2021-12-09 RX ORDER — BUDESONIDE AND FORMOTEROL FUMARATE DIHYDRATE 160; 4.5 UG/1; UG/1
AEROSOL RESPIRATORY (INHALATION)
Qty: 10.2 G | Refills: 0 | Status: SHIPPED | OUTPATIENT
Start: 2021-12-09 | End: 2022-01-05

## 2021-12-09 NOTE — TELEPHONE ENCOUNTER
Jhonelias Guillen is requesting a refill on the following medication(s):  Requested Prescriptions     Pending Prescriptions Disp Refills    LANTUS SOLOSTAR 100 UNIT/ML injection pen [Pharmacy Med Name: Lantus SoloStar 100UNIT/ML SOPN] 15 mL 3     Sig: INJECT 25 UNITS SUBCUTANEOUSLY TWICE A DAY    SYMBICORT 160-4.5 MCG/ACT AERO [Pharmacy Med Name: Symbicort 160-4. 5MCG/ACT AERO] 10.2 g 0     Sig: INHALE 2 PUFFS BY MOUTH TWICE A DAY       Last Visit Date (If Applicable):  30/02/6751    Next Visit Date:    12/29/2021

## 2021-12-29 NOTE — TELEPHONE ENCOUNTER
Sara Ruano is requesting a refill on the following medication(s):  Requested Prescriptions     Pending Prescriptions Disp Refills    Insulin Pen Needle (ULTICARE SHORT PEN NEEDLES) 31G X 8 MM MISC [Pharmacy Med Name: UltiCare Short Pen Needles 31G X 8 MM MISC] 100 each 3     Sig: USE WITH LANTUS TWICE A DAY       Last Visit Date (If Applicable):  4/2/7520    Next Visit Date:    Visit date not found

## 2022-01-04 ENCOUNTER — OFFICE VISIT (OUTPATIENT)
Dept: FAMILY MEDICINE CLINIC | Age: 47
End: 2022-01-04
Payer: COMMERCIAL

## 2022-01-04 VITALS
SYSTOLIC BLOOD PRESSURE: 120 MMHG | RESPIRATION RATE: 20 BRPM | HEIGHT: 71 IN | WEIGHT: 300 LBS | HEART RATE: 84 BPM | BODY MASS INDEX: 42 KG/M2 | OXYGEN SATURATION: 95 % | DIASTOLIC BLOOD PRESSURE: 82 MMHG

## 2022-01-04 DIAGNOSIS — E66.01 CLASS 2 SEVERE OBESITY DUE TO EXCESS CALORIES WITH SERIOUS COMORBIDITY AND BODY MASS INDEX (BMI) OF 35.0 TO 35.9 IN ADULT (HCC): ICD-10-CM

## 2022-01-04 DIAGNOSIS — I10 ESSENTIAL HYPERTENSION: Primary | ICD-10-CM

## 2022-01-04 DIAGNOSIS — R80.9 TYPE 2 DIABETES MELLITUS WITH MICROALBUMINURIA, WITH LONG-TERM CURRENT USE OF INSULIN (HCC): ICD-10-CM

## 2022-01-04 DIAGNOSIS — J45.40 MODERATE PERSISTENT ASTHMA, UNSPECIFIED WHETHER COMPLICATED: ICD-10-CM

## 2022-01-04 DIAGNOSIS — E87.6 HYPOKALEMIA: ICD-10-CM

## 2022-01-04 DIAGNOSIS — Z79.4 TYPE 2 DIABETES MELLITUS WITH MICROALBUMINURIA, WITH LONG-TERM CURRENT USE OF INSULIN (HCC): ICD-10-CM

## 2022-01-04 DIAGNOSIS — E55.9 VITAMIN D DEFICIENCY: ICD-10-CM

## 2022-01-04 DIAGNOSIS — E78.1 HYPERTRIGLYCERIDEMIA: ICD-10-CM

## 2022-01-04 DIAGNOSIS — E11.29 TYPE 2 DIABETES MELLITUS WITH MICROALBUMINURIA, WITH LONG-TERM CURRENT USE OF INSULIN (HCC): ICD-10-CM

## 2022-01-04 PROBLEM — F11.21 OPIOID DEPENDENCE IN REMISSION (HCC): Status: ACTIVE | Noted: 2022-01-04

## 2022-01-04 PROCEDURE — G8427 DOCREV CUR MEDS BY ELIG CLIN: HCPCS | Performed by: INTERNAL MEDICINE

## 2022-01-04 PROCEDURE — 2022F DILAT RTA XM EVC RTNOPTHY: CPT | Performed by: INTERNAL MEDICINE

## 2022-01-04 PROCEDURE — 99213 OFFICE O/P EST LOW 20 MIN: CPT | Performed by: INTERNAL MEDICINE

## 2022-01-04 PROCEDURE — G8484 FLU IMMUNIZE NO ADMIN: HCPCS | Performed by: INTERNAL MEDICINE

## 2022-01-04 PROCEDURE — 99214 OFFICE O/P EST MOD 30 MIN: CPT | Performed by: INTERNAL MEDICINE

## 2022-01-04 PROCEDURE — G8417 CALC BMI ABV UP PARAM F/U: HCPCS | Performed by: INTERNAL MEDICINE

## 2022-01-04 PROCEDURE — 4004F PT TOBACCO SCREEN RCVD TLK: CPT | Performed by: INTERNAL MEDICINE

## 2022-01-04 PROCEDURE — 3046F HEMOGLOBIN A1C LEVEL >9.0%: CPT | Performed by: INTERNAL MEDICINE

## 2022-01-04 RX ORDER — CITALOPRAM 10 MG/1
10 TABLET ORAL DAILY
COMMUNITY
Start: 2021-12-28 | End: 2022-05-17

## 2022-01-04 ASSESSMENT — ENCOUNTER SYMPTOMS
CHEST TIGHTNESS: 0
COUGH: 0
BLOOD IN STOOL: 0
SINUS PAIN: 0
DIARRHEA: 0
ABDOMINAL PAIN: 0
RHINORRHEA: 0
SHORTNESS OF BREATH: 0
SORE THROAT: 0
TROUBLE SWALLOWING: 0

## 2022-01-04 NOTE — PROGRESS NOTES
[address]  [address] [place] [address]  Dept: [phone]  Dept Fax: [phone]  Loc: [phone]     Visit Date:  1/4/2022    Patient:  Ramos James  YOB: 1975    HPI:   Ramos James presents today for   Chief Complaint   Patient presents with    Hypertension    Diabetes   . HPI 41year old male with a history of anxiety/borderline personality problems, bipolar disorder, morbid obesity BMI 38,HTN, Asthma/COPD 30ppd smoking, arthritis/chronic low back pain, knee pain,ringworm, heroin/cocaine/opiod abuse/addiction  is coming for for a follow up today. Have not taken adipex for 3 weeks. The sober house in charge person made him stop the adipex. ?UDS did not reflected the drug maybe. 1 year sober without alcohol and or drugs.         Medications  Prior to Visit Medications    Medication Sig Taking?  Authorizing Provider   citalopram (CELEXA) 10 MG tablet  Yes Historical Provider, MD   Insulin Pen Needle ([employer]IC[employer] SHORT PEN NEEDLES) 31G X 8 MM MISC USE WITH LANTUS TWICE A DAY Yes Kev Ramirez MD   LANTUS SOLOSTAR 100 UNIT/ML injection pen INJECT 25 [place] Yes Kev Ramirez MD   SYMBICORT 160-4.5 MCG/ACT AERO INHALE 2 PUFFS BY MOUTH TWICE A DAY Yes Kev Ramirez MD   hydroCHLOROthiazide (HYDRODIURIL) 25 MG tablet TAKE 1 TABLET BY MOUTH EVERY MORNING Yes Kev Ramirez MD   ezetimibe (ZETIA) 10 MG tablet TAKE 1 TABLET BY MOUTH DAILY Yes Kev Ramirez MD   atorvastatin (LIPITOR) 20 MG tablet TAKE 1 TABLET BY MOUTH DAILY Yes Kev Ramirez MD   TRULICITY 1.5 BL/0.8IG SOPN INJECT 1.5MG Tanesha Contras Yes Kev Ramirez MD   potassium chloride (KLOR-CON M) 20 MEQ extended release tablet TAKE 1 TABLET BY MOUTH DAILY Yes Kev Ramirez MD   losartan (COZAAR) 100 MG tablet TAKE 1 TABLET BY MOUTH DAILY Yes Kev Ramirez MD Cholecalciferol (VITAMIN D3) 50 MCG (2000 UT) TABS TAKE 1 TABLET BY MOUTH DAILY Yes Shea Julio MD   metFORMIN (GLUCOPHAGE) 1000 MG tablet TAKE 1 TABLET BY MOUTH TWICE A DAY WITH MEALS Yes Shea Julio MD   metoprolol succinate (TOPROL XL) 50 MG extended release tablet Take 1 tablet by mouth daily Yes Shea Julio MD   Blood Pressure Monitoring (B-D ASSURE BPM/MANUAL ARM CUFF) MISC 1 applicator by Does not apply route 2 times daily Check BP twice daily Yes Shea Julio MD   naproxen (NAPROSYN) 500 MG tablet Take 1 tablet by mouth 2 times daily as needed for Pain Yes Shea Julio MD   hydrOXYzine (ATARAX) 25 MG tablet  Yes Historical Provider, MD   blood glucose monitor kit and supplies Dispense all needed supplies to include: monitor, strips, lancing device, lancets, control solutions, alcohol swabs. Yes Shea Julio MD   Lancets MISC 1 each by Does not apply route 3 times daily Yes Shea Julio MD   blood glucose monitor strips Test 3 times a day & as needed for symptoms of irregular blood glucose. Dispense sufficient amount for indicated testing frequency. Yes Shea Julio MD   albuterol sulfate  (90 Base) MCG/ACT inhaler Inhale 2 puffs into the lungs every 4 hours as needed for Wheezing or Shortness of Breath (cough) Yes Shea Julio MD   OXcarbazepine (TRILEPTAL) 300 MG tablet Take 1 tablet by mouth daily Yes Historical Provider, MD   glucose 4 g chewable tablet Take 4 tablets by mouth as needed for Low blood sugar  Patient not taking: Reported on 10/11/2021  Shea Julio MD   hydrOXYzine (VISTARIL) 50 MG capsule take 1 capsule by mouth at bedtime  Patient not taking: Reported on 1/4/2022  Historical Provider, MD   prazosin (MINIPRESS) 2 MG capsule Take 2 capsules by mouth daily   Patient not taking: Reported on 1/4/2022  Historical Provider, MD        Allergies:  is allergic to amoxicillin, other, and pcn [penicillins].      Past Medical History:   has a past medical history of Bipolar 1 disorder ([facility] [address] History of heroin abuse ([facility] [address] Hypertension, and Personality disorder ([facility] [address] Past Surgical History   has a past surgical history that includes knee surgery (Left) and Finger surgery (Left). Family History  family history includes Cancer in his father; Diabetes in his mother; Heart Disease in his father; High Blood Pressure in his father and mother; Kidney Disease in his mother. Social History   reports that he has been smoking cigarettes. He uses smokeless tobacco. He reports previous alcohol use. Health Maintenance:    Health Maintenance   Topic Date Due    Hepatitis C screen  Never done    Pneumococcal 0-64 years Vaccine (1 of 2 - PPSV23) Never done    Diabetic foot exam  Never done    HIV screen  Never done    Diabetic retinal exam  Never done    Hepatitis B vaccine (1 of 3 - Risk 3-dose series) Never done    DTaP/Tdap/Td vaccine (1 - Tdap) 09/16/2014    Colon cancer screen colonoscopy  Never done    Flu vaccine (1) Never done    COVID-19 Vaccine (3 - Booster for Moderna series) 11/25/2021    Lipid screen  03/22/2022    Potassium monitoring  03/22/2022    Creatinine monitoring  03/22/2022    Depression Screen  04/29/2022    A1C test (Diabetic or Prediabetic)  10/11/2022    Hepatitis A vaccine  Aged Out    Hib vaccine  Aged Out    Meningococcal (ACWY) vaccine  Aged Out       Subjective:      Review of Systems   Constitutional: Negative for fatigue, fever and unexpected weight change. HENT: Negative for ear pain, postnasal drip, rhinorrhea, sinus pain, sore throat and trouble swallowing. Eyes: Negative for visual disturbance. Respiratory: Negative for cough, chest tightness and shortness of breath. Cardiovascular: Negative for chest pain and leg swelling. Gastrointestinal: Negative for abdominal pain, blood in stool and diarrhea. Endocrine: Negative for polyuria. Genitourinary: Negative for difficulty urinating and flank pain. Musculoskeletal: Negative for arthralgias, joint swelling and myalgias. Skin: Negative for rash. Allergic/Immunologic: Negative for environmental allergies. Neurological: Negative for weakness, light-headedness, numbness and headaches. Hematological: Negative for adenopathy. Psychiatric/Behavioral: Negative for behavioral problems and suicidal ideas. The patient is not nervous/anxious. Objective:     /82 (Site: Left Upper Arm, Position: Sitting, Cuff Size: Large Adult)   Pulse 84   Resp 20   Ht 5' 11\" (1.803 m)   Wt 300 lb (136.1 kg)   SpO2 95%   BMI 41.84 kg/m²     Physical Exam  Vitals and nursing note reviewed. HENT:      Head: Normocephalic and atraumatic. Cardiovascular:      Rate and Rhythm: Normal rate and regular rhythm. Pulses: Normal pulses. Heart sounds: Normal heart sounds. Pulmonary:      Effort: Pulmonary effort is normal.      Breath sounds: Normal breath sounds. No stridor. Abdominal:      General: Abdomen is flat. Musculoskeletal:         General: Normal range of motion. Skin:     General: Skin is warm. Neurological:      Mental Status: He is oriented to person, place, and time. Mental status is at baseline. Psychiatric:         Mood and Affect: Mood normal.             Assessment       Diagnosis Orders   1. Essential hypertension  CBC With Auto Differential    Comprehensive Metabolic Panel, Fasting    TSH    T4, Free    Hemoglobin A1C    Urinalysis With Microscopic    Lipid Panel    Vitamin D 25 Hydroxy    Microalbumin, Ur    Creatinine, Random Urine   2. Moderate persistent asthma, unspecified whether complicated  CBC With Auto Differential    Comprehensive Metabolic Panel, Fasting    TSH    T4, Free    Hemoglobin A1C    Urinalysis With Microscopic    Lipid Panel    Vitamin D 25 Hydroxy    Microalbumin, Ur    Creatinine, Random Urine   3.  Type 2 diabetes mellitus with microalbuminuria, with long-term current use of insulin (HCC)  CBC With Auto Differential    Comprehensive Metabolic Panel, Fasting    TSH    T4, Free    Hemoglobin A1C    Urinalysis With Microscopic    Lipid Panel    Vitamin D 25 Hydroxy    Microalbumin, Ur    Creatinine, Random Urine   4. Hypokalemia  CBC With Auto Differential    Comprehensive Metabolic Panel, Fasting    TSH    T4, Free    Hemoglobin A1C    Urinalysis With Microscopic    Lipid Panel    Vitamin D 25 Hydroxy    Microalbumin, Ur    Creatinine, Random Urine   5. Vitamin D deficiency  CBC With Auto Differential    Comprehensive Metabolic Panel, Fasting    TSH    T4, Free    Hemoglobin A1C    Urinalysis With Microscopic    Lipid Panel    Vitamin D 25 Hydroxy    Microalbumin, Ur    Creatinine, Random Urine   6. Hypertriglyceridemia  CBC With Auto Differential    Comprehensive Metabolic Panel, Fasting    TSH    T4, Free    Hemoglobin A1C    Urinalysis With Microscopic    Lipid Panel    Vitamin D 25 Hydroxy    Microalbumin, Ur    Creatinine, Random Urine   7. Class 2 severe obesity due to excess calories with serious comorbidity and body mass index (BMI) of 35.0 to 35.9 in adult (HCC)  CBC With Auto Differential    Comprehensive Metabolic Panel, Fasting    TSH    T4, Free    Hemoglobin A1C    Urinalysis With Microscopic    Lipid Panel    Vitamin D 25 Hydroxy    Microalbumin, Ur    Creatinine, Random Urine         PLAN   Trial of contrave to see if that would help with weight loss. Did discuss the side effects of contrave like seizures/opiod withdrawals/worsening depression/suicidal thoughts/hepatoxicity and so forth. One tablet once daily in the morning for 1 week; at week 2, increase to 1 tablet twice daily  in the morning and evening and continue for 1 week; at week 3, increase to 2 tablets in the morning and 1 tablet in the evening and continue for 1 week; at week 4, increase to 2 tablets twice daily.   Orders Placed This Encounter   Procedures    CBC With Auto Differential     Standing Status:   Future     Standing Expiration Date:   1/4/2023    Comprehensive Metabolic Panel, Fasting     Standing Status:   Future     Standing Expiration Date:   1/4/2023    TSH     Standing Status:   Future     Standing Expiration Date:   1/4/2023    T4, Free     Standing Status:   Future     Standing Expiration Date:   1/4/2023    Hemoglobin A1C     Standing Status:   Future     Standing Expiration Date:   1/4/2023    Urinalysis With Microscopic     Standing Status:   Future     Standing Expiration Date:   1/4/2023     Order Specific Question:   SPECIFY(EX-CATH,MIDSTREAM,CYSTO,ETC)? Answer:   MID STREAM    Lipid Panel     Standing Status:   Future     Standing Expiration Date:   1/4/2023     Order Specific Question:   Is Patient Fasting?/# of Hours     Answer:   12 HOUR    Vitamin D 25 Hydroxy     Standing Status:   Future     Standing Expiration Date:   1/4/2023    Microalbumin, Ur     Standing Status:   Future     Standing Expiration Date:   1/4/2023    Creatinine, Random Urine     Standing Status:   Future     Standing Expiration Date:   1/4/2023        No follow-ups on file. Patient given educational materials - see patient instructions. Discussed use, benefit, and side effects of prescribed medications. All patient questions answered. Pt voiced understanding. Reviewed health maintenance.        Electronically signed Darin Lui MD on 1/4/2022 at 3:32 PM EST

## 2022-01-05 ENCOUNTER — TELEPHONE (OUTPATIENT)
Dept: FAMILY MEDICINE CLINIC | Age: 47
End: 2022-01-05

## 2022-01-05 DIAGNOSIS — E11.29 TYPE 2 DIABETES MELLITUS WITH MICROALBUMINURIA, WITH LONG-TERM CURRENT USE OF INSULIN (HCC): ICD-10-CM

## 2022-01-05 DIAGNOSIS — Z79.4 TYPE 2 DIABETES MELLITUS WITH MICROALBUMINURIA, WITH LONG-TERM CURRENT USE OF INSULIN (HCC): ICD-10-CM

## 2022-01-05 DIAGNOSIS — E55.9 VITAMIN D DEFICIENCY: ICD-10-CM

## 2022-01-05 DIAGNOSIS — R63.1 POLYDIPSIA: ICD-10-CM

## 2022-01-05 DIAGNOSIS — G89.29 CHRONIC BILATERAL LOW BACK PAIN WITH RIGHT-SIDED SCIATICA: ICD-10-CM

## 2022-01-05 DIAGNOSIS — L60.0 INGROWING NAIL, RIGHT GREAT TOE: ICD-10-CM

## 2022-01-05 DIAGNOSIS — G89.29 OTHER CHRONIC PAIN: ICD-10-CM

## 2022-01-05 DIAGNOSIS — J45.40 MODERATE PERSISTENT ASTHMA, UNSPECIFIED WHETHER COMPLICATED: ICD-10-CM

## 2022-01-05 DIAGNOSIS — F17.200 SMOKER: ICD-10-CM

## 2022-01-05 DIAGNOSIS — M54.2 NECK PAIN: ICD-10-CM

## 2022-01-05 DIAGNOSIS — E87.6 HYPOKALEMIA: ICD-10-CM

## 2022-01-05 DIAGNOSIS — B35.9 RINGWORM: ICD-10-CM

## 2022-01-05 DIAGNOSIS — B35.3 TINEA PEDIS OF LEFT FOOT: ICD-10-CM

## 2022-01-05 DIAGNOSIS — R21 RASH: ICD-10-CM

## 2022-01-05 DIAGNOSIS — I10 ESSENTIAL HYPERTENSION: ICD-10-CM

## 2022-01-05 DIAGNOSIS — F14.10 COCAINE ABUSE (HCC): ICD-10-CM

## 2022-01-05 DIAGNOSIS — G89.29 CHRONIC PAIN OF RIGHT KNEE: ICD-10-CM

## 2022-01-05 DIAGNOSIS — E78.1 HYPERTRIGLYCERIDEMIA: ICD-10-CM

## 2022-01-05 DIAGNOSIS — F11.21 OPIOID DEPENDENCE IN REMISSION (HCC): ICD-10-CM

## 2022-01-05 DIAGNOSIS — M25.561 CHRONIC PAIN OF RIGHT KNEE: ICD-10-CM

## 2022-01-05 DIAGNOSIS — J40 BRONCHITIS: ICD-10-CM

## 2022-01-05 DIAGNOSIS — R80.9 TYPE 2 DIABETES MELLITUS WITH MICROALBUMINURIA, WITH LONG-TERM CURRENT USE OF INSULIN (HCC): ICD-10-CM

## 2022-01-05 DIAGNOSIS — R06.2 WHEEZING: ICD-10-CM

## 2022-01-05 DIAGNOSIS — R35.89 POLYURIA: ICD-10-CM

## 2022-01-05 DIAGNOSIS — M54.41 CHRONIC BILATERAL LOW BACK PAIN WITH RIGHT-SIDED SCIATICA: ICD-10-CM

## 2022-01-05 RX ORDER — POTASSIUM CHLORIDE 20 MEQ/1
20 TABLET, EXTENDED RELEASE ORAL DAILY
Qty: 30 TABLET | Refills: 1 | Status: SHIPPED | OUTPATIENT
Start: 2022-01-05 | End: 2022-03-08

## 2022-01-05 RX ORDER — BUDESONIDE AND FORMOTEROL FUMARATE DIHYDRATE 160; 4.5 UG/1; UG/1
AEROSOL RESPIRATORY (INHALATION)
Qty: 10.2 G | Refills: 0 | Status: SHIPPED | OUTPATIENT
Start: 2022-01-05 | End: 2022-02-01 | Stop reason: SDUPTHER

## 2022-01-05 NOTE — TELEPHONE ENCOUNTER
Patricia Amaral is requesting a refill on the following medication(s):  Requested Prescriptions     Pending Prescriptions Disp Refills    potassium chloride (KLOR-CON M) 20 MEQ extended release tablet [Pharmacy Med Name: Potassium Chloride [place] ER 20MEQ TBCR] 30 tablet 1     Sig: TAKE 1 TABLET BY MOUTH DAILY    metFORMIN (GLUCOPHAGE) 1000 MG tablet [Pharmacy Med Name: metFORMIN HCl 1000MG TABS*] 60 tablet 3     Sig: TAKE 1 TABLET BY MOUTH TWICE A DAY WITH MEALS    SYMBICORT 160-4.5 MCG/ACT AERO [Pharmacy Med Name: Symbicort 160-4. 5MCG/ACT AERO] 10.2 g 0     Sig: INHALE 2 PUFFS BY MOUTH TWICE A DAY RINSE MOUTH AFTER USE       Last Visit Date (If Applicable):  5/5/3773    Next Visit Date:    2/7/2022

## 2022-01-05 NOTE — TELEPHONE ENCOUNTER
I left voicemail to inform patient that Elizabeth Ramirez will not be covered by insurance but he should be able to go online and get a coupon to help with the cost.

## 2022-01-25 DIAGNOSIS — G89.29 OTHER CHRONIC PAIN: ICD-10-CM

## 2022-01-25 DIAGNOSIS — F14.10 COCAINE ABUSE (HCC): ICD-10-CM

## 2022-01-25 DIAGNOSIS — I10 ESSENTIAL HYPERTENSION: ICD-10-CM

## 2022-01-25 DIAGNOSIS — R35.89 POLYURIA: ICD-10-CM

## 2022-01-25 DIAGNOSIS — G89.29 CHRONIC BILATERAL LOW BACK PAIN WITH RIGHT-SIDED SCIATICA: ICD-10-CM

## 2022-01-25 DIAGNOSIS — J45.40 MODERATE PERSISTENT ASTHMA, UNSPECIFIED WHETHER COMPLICATED: ICD-10-CM

## 2022-01-25 DIAGNOSIS — E11.29 TYPE 2 DIABETES MELLITUS WITH MICROALBUMINURIA, WITH LONG-TERM CURRENT USE OF INSULIN (HCC): ICD-10-CM

## 2022-01-25 DIAGNOSIS — R06.2 WHEEZING: ICD-10-CM

## 2022-01-25 DIAGNOSIS — G89.29 CHRONIC PAIN OF RIGHT KNEE: ICD-10-CM

## 2022-01-25 DIAGNOSIS — R63.1 POLYDIPSIA: ICD-10-CM

## 2022-01-25 DIAGNOSIS — J40 BRONCHITIS: ICD-10-CM

## 2022-01-25 DIAGNOSIS — E78.2 MIXED HYPERLIPIDEMIA: ICD-10-CM

## 2022-01-25 DIAGNOSIS — B35.3 TINEA PEDIS OF LEFT FOOT: ICD-10-CM

## 2022-01-25 DIAGNOSIS — M54.41 CHRONIC BILATERAL LOW BACK PAIN WITH RIGHT-SIDED SCIATICA: ICD-10-CM

## 2022-01-25 DIAGNOSIS — F17.200 SMOKER: ICD-10-CM

## 2022-01-25 DIAGNOSIS — L60.0 INGROWING NAIL, RIGHT GREAT TOE: ICD-10-CM

## 2022-01-25 DIAGNOSIS — R80.9 TYPE 2 DIABETES MELLITUS WITH MICROALBUMINURIA, WITH LONG-TERM CURRENT USE OF INSULIN (HCC): ICD-10-CM

## 2022-01-25 DIAGNOSIS — M25.561 CHRONIC PAIN OF RIGHT KNEE: ICD-10-CM

## 2022-01-25 DIAGNOSIS — B35.9 RINGWORM: ICD-10-CM

## 2022-01-25 DIAGNOSIS — E55.9 VITAMIN D DEFICIENCY: ICD-10-CM

## 2022-01-25 DIAGNOSIS — M54.2 NECK PAIN: ICD-10-CM

## 2022-01-25 DIAGNOSIS — Z79.4 TYPE 2 DIABETES MELLITUS WITH MICROALBUMINURIA, WITH LONG-TERM CURRENT USE OF INSULIN (HCC): ICD-10-CM

## 2022-01-25 DIAGNOSIS — E87.6 HYPOKALEMIA: ICD-10-CM

## 2022-01-25 DIAGNOSIS — F11.21 OPIOID DEPENDENCE IN REMISSION (HCC): ICD-10-CM

## 2022-01-25 DIAGNOSIS — E78.1 HYPERTRIGLYCERIDEMIA: ICD-10-CM

## 2022-01-25 DIAGNOSIS — R21 RASH: ICD-10-CM

## 2022-01-25 RX ORDER — METOPROLOL SUCCINATE 50 MG/1
50 TABLET, EXTENDED RELEASE ORAL DAILY
Qty: 90 TABLET | Refills: 1 | Status: SHIPPED | OUTPATIENT
Start: 2022-01-25 | End: 2022-04-12 | Stop reason: SDUPTHER

## 2022-01-25 RX ORDER — CHOLECALCIFEROL (VITAMIN D3) 125 MCG
CAPSULE ORAL
Qty: 30 TABLET | Refills: 1 | Status: SHIPPED | OUTPATIENT
Start: 2022-01-25 | End: 2022-03-25

## 2022-01-25 NOTE — TELEPHONE ENCOUNTER
Mathew Hernandez is requesting a refill on the following medication(s):  Requested Prescriptions     Pending Prescriptions Disp Refills    metoprolol succinate (TOPROL XL) 50 MG extended release tablet [Pharmacy Med Name: Metoprolol Succinate ER 50MG TB24] 90 tablet 1     Sig: TAKE 1 TABLET BY MOUTH DAILY    Cholecalciferol (VITAMIN D3) 50 MCG (2000 UT) TABS [Pharmacy Med Name: Vitamin D3 Super Strength 50 MCG(2000 UT) TABS] 30 tablet 1     Sig: TAKE 1 TABLET BY MOUTH DAILY       Last Visit Date (If Applicable):  2/0/9940    Next Visit Date:    2/7/2022

## 2022-02-01 DIAGNOSIS — G89.29 CHRONIC PAIN OF RIGHT KNEE: ICD-10-CM

## 2022-02-01 DIAGNOSIS — F17.200 SMOKER: ICD-10-CM

## 2022-02-01 DIAGNOSIS — G89.29 CHRONIC BILATERAL LOW BACK PAIN WITH RIGHT-SIDED SCIATICA: ICD-10-CM

## 2022-02-01 DIAGNOSIS — J45.40 MODERATE PERSISTENT ASTHMA, UNSPECIFIED WHETHER COMPLICATED: ICD-10-CM

## 2022-02-01 DIAGNOSIS — M54.2 NECK PAIN: ICD-10-CM

## 2022-02-01 DIAGNOSIS — M25.561 CHRONIC PAIN OF RIGHT KNEE: ICD-10-CM

## 2022-02-01 DIAGNOSIS — R06.2 WHEEZING: ICD-10-CM

## 2022-02-01 DIAGNOSIS — M54.41 CHRONIC BILATERAL LOW BACK PAIN WITH RIGHT-SIDED SCIATICA: ICD-10-CM

## 2022-02-01 DIAGNOSIS — B35.3 TINEA PEDIS OF LEFT FOOT: ICD-10-CM

## 2022-02-01 DIAGNOSIS — J40 BRONCHITIS: ICD-10-CM

## 2022-02-01 DIAGNOSIS — G89.29 OTHER CHRONIC PAIN: ICD-10-CM

## 2022-02-01 DIAGNOSIS — R21 RASH: ICD-10-CM

## 2022-02-01 RX ORDER — BUDESONIDE AND FORMOTEROL FUMARATE DIHYDRATE 160; 4.5 UG/1; UG/1
AEROSOL RESPIRATORY (INHALATION)
Qty: 10.2 G | Refills: 3 | Status: SHIPPED | OUTPATIENT
Start: 2022-02-01 | End: 2022-04-12 | Stop reason: SDUPTHER

## 2022-02-01 NOTE — TELEPHONE ENCOUNTER
Apple Fox is requesting a refill on the following medication(s):  Requested Prescriptions     Pending Prescriptions Disp Refills    budesonide-formoterol (SYMBICORT) 160-4.5 MCG/ACT AERO 10.2 g 3     Sig: INHALE 2 PUFFS BY MOUTH TWICE A DAY RINSE MOUTH AFTER USE       Last Visit Date (If Applicable):  8/9/0016    Next Visit Date:    2/7/2022

## 2022-02-11 DIAGNOSIS — E87.6 HYPOKALEMIA: ICD-10-CM

## 2022-02-11 DIAGNOSIS — R80.9 TYPE 2 DIABETES MELLITUS WITH MICROALBUMINURIA, WITH LONG-TERM CURRENT USE OF INSULIN (HCC): ICD-10-CM

## 2022-02-11 DIAGNOSIS — E78.1 HYPERTRIGLYCERIDEMIA: ICD-10-CM

## 2022-02-11 DIAGNOSIS — I10 ESSENTIAL HYPERTENSION: ICD-10-CM

## 2022-02-11 DIAGNOSIS — Z79.4 TYPE 2 DIABETES MELLITUS WITH MICROALBUMINURIA, WITH LONG-TERM CURRENT USE OF INSULIN (HCC): ICD-10-CM

## 2022-02-11 DIAGNOSIS — J45.40 MODERATE PERSISTENT ASTHMA, UNSPECIFIED WHETHER COMPLICATED: ICD-10-CM

## 2022-02-11 DIAGNOSIS — E66.01 CLASS 2 SEVERE OBESITY DUE TO EXCESS CALORIES WITH SERIOUS COMORBIDITY AND BODY MASS INDEX (BMI) OF 35.0 TO 35.9 IN ADULT (HCC): ICD-10-CM

## 2022-02-11 DIAGNOSIS — E55.9 VITAMIN D DEFICIENCY: ICD-10-CM

## 2022-02-11 DIAGNOSIS — E11.29 TYPE 2 DIABETES MELLITUS WITH MICROALBUMINURIA, WITH LONG-TERM CURRENT USE OF INSULIN (HCC): ICD-10-CM

## 2022-02-15 ENCOUNTER — OFFICE VISIT (OUTPATIENT)
Dept: FAMILY MEDICINE CLINIC | Age: 47
End: 2022-02-15
Payer: COMMERCIAL

## 2022-02-15 VITALS
TEMPERATURE: 97.8 F | RESPIRATION RATE: 16 BRPM | DIASTOLIC BLOOD PRESSURE: 108 MMHG | OXYGEN SATURATION: 97 % | HEART RATE: 88 BPM | HEIGHT: 71 IN | WEIGHT: 300.6 LBS | BODY MASS INDEX: 42.08 KG/M2 | SYSTOLIC BLOOD PRESSURE: 162 MMHG

## 2022-02-15 DIAGNOSIS — F11.21 OPIOID DEPENDENCE IN REMISSION (HCC): ICD-10-CM

## 2022-02-15 DIAGNOSIS — E78.1 HYPERTRIGLYCERIDEMIA: ICD-10-CM

## 2022-02-15 DIAGNOSIS — M54.50 CHRONIC MIDLINE LOW BACK PAIN WITHOUT SCIATICA: ICD-10-CM

## 2022-02-15 DIAGNOSIS — J45.40 MODERATE PERSISTENT ASTHMA, UNSPECIFIED WHETHER COMPLICATED: ICD-10-CM

## 2022-02-15 DIAGNOSIS — E55.9 VITAMIN D DEFICIENCY: ICD-10-CM

## 2022-02-15 DIAGNOSIS — R80.9 TYPE 2 DIABETES MELLITUS WITH MICROALBUMINURIA, WITH LONG-TERM CURRENT USE OF INSULIN (HCC): ICD-10-CM

## 2022-02-15 DIAGNOSIS — E66.01 CLASS 2 SEVERE OBESITY DUE TO EXCESS CALORIES WITH SERIOUS COMORBIDITY AND BODY MASS INDEX (BMI) OF 35.0 TO 35.9 IN ADULT (HCC): ICD-10-CM

## 2022-02-15 DIAGNOSIS — Z79.4 TYPE 2 DIABETES MELLITUS WITH MICROALBUMINURIA, WITH LONG-TERM CURRENT USE OF INSULIN (HCC): ICD-10-CM

## 2022-02-15 DIAGNOSIS — E11.40 TYPE 2 DIABETES MELLITUS WITH DIABETIC NEUROPATHY, WITH LONG-TERM CURRENT USE OF INSULIN (HCC): ICD-10-CM

## 2022-02-15 DIAGNOSIS — E87.6 HYPOKALEMIA: ICD-10-CM

## 2022-02-15 DIAGNOSIS — I10 ESSENTIAL HYPERTENSION: ICD-10-CM

## 2022-02-15 DIAGNOSIS — E11.29 TYPE 2 DIABETES MELLITUS WITH MICROALBUMINURIA, WITH LONG-TERM CURRENT USE OF INSULIN (HCC): ICD-10-CM

## 2022-02-15 DIAGNOSIS — I10 ESSENTIAL HYPERTENSION: Primary | ICD-10-CM

## 2022-02-15 DIAGNOSIS — Z79.4 TYPE 2 DIABETES MELLITUS WITH DIABETIC NEUROPATHY, WITH LONG-TERM CURRENT USE OF INSULIN (HCC): ICD-10-CM

## 2022-02-15 DIAGNOSIS — G89.29 CHRONIC MIDLINE LOW BACK PAIN WITHOUT SCIATICA: ICD-10-CM

## 2022-02-15 PROCEDURE — 99213 OFFICE O/P EST LOW 20 MIN: CPT

## 2022-02-15 PROCEDURE — 3046F HEMOGLOBIN A1C LEVEL >9.0%: CPT | Performed by: INTERNAL MEDICINE

## 2022-02-15 PROCEDURE — G8484 FLU IMMUNIZE NO ADMIN: HCPCS | Performed by: INTERNAL MEDICINE

## 2022-02-15 PROCEDURE — G8417 CALC BMI ABV UP PARAM F/U: HCPCS | Performed by: INTERNAL MEDICINE

## 2022-02-15 PROCEDURE — 2022F DILAT RTA XM EVC RTNOPTHY: CPT | Performed by: INTERNAL MEDICINE

## 2022-02-15 PROCEDURE — 99214 OFFICE O/P EST MOD 30 MIN: CPT | Performed by: INTERNAL MEDICINE

## 2022-02-15 PROCEDURE — G8427 DOCREV CUR MEDS BY ELIG CLIN: HCPCS | Performed by: INTERNAL MEDICINE

## 2022-02-15 PROCEDURE — 4004F PT TOBACCO SCREEN RCVD TLK: CPT | Performed by: INTERNAL MEDICINE

## 2022-02-15 RX ORDER — UBIQUINOL 100 MG
CAPSULE ORAL
COMMUNITY
Start: 2022-01-10 | End: 2022-04-07 | Stop reason: SDUPTHER

## 2022-02-15 NOTE — PROGRESS NOTES
[address]  [address] [facility] [address]  Dept: [phone]  Dept Fax: [phone]  Loc: [phone]     Visit Date:  2/15/2022    Patient:  Andrés Gonzales  YOB: 1975    HPI:   Andrés Gonzales presents today for   Chief Complaint   Patient presents with    Hypertension     pt not documenting at home, taking meds as directed.  Other     States no conserns at this time   . HPI 41year old male with a history of anxiety/borderline personality problems, bipolar disorder, morbid obesity BMI 38,HTN, Asthma/COPD 30ppd smoking, arthritis/chronic low back pain, knee pain,ringworm, heroin/cocaine/opiod abuse/addiction  is coming for for a follow up today. DM type 2- A1C>9. Has gone up since last seen. He reports taking his medications regularly including insulin. Have stopped seeing Robert Tierney find it harder to go to [place]. HTN- BP was 162/108. Does not check at home.         Past Medical History:   Diagnosis Date    Bipolar 1 disorder ([facility]     [facility]     Hypertension     Personality disorder ([facility]      Family History   Problem Relation Age of Onset    High Blood Pressure Mother     Diabetes Mother     Kidney Disease Mother     Heart Disease Father     High Blood Pressure Father     Cancer Father      Current Outpatient Medications   Medication Sig Dispense Refill    Alcohol Swabs (ALCOHOL PREP) 70 % PADS       budesonide-formoterol (SYMBICORT) 160-4.5 MCG/ACT AERO INHALE 2 PUFFS BY MOUTH TWICE A DAY RINSE MOUTH AFTER USE 10.2 g 3    metoprolol succinate (TOPROL XL) 50 MG extended release tablet TAKE 1 TABLET BY MOUTH DAILY 90 tablet 1    Cholecalciferol (VITAMIN D3) 50 MCG (2000 UT) TABS TAKE 1 TABLET BY MOUTH DAILY 30 tablet 1    potassium chloride (KLOR-CON M) 20 MEQ extended release tablet TAKE 1 TABLET BY MOUTH DAILY 30 tablet 1  metFORMIN (GLUCOPHAGE) 1000 MG tablet TAKE 1 TABLET BY MOUTH TWICE A DAY WITH MEALS 60 tablet 3    citalopram (CELEXA) 10 MG tablet       Insulin Pen Needle (ULTICARE SHORT PEN NEEDLES) 31G X 8 MM MISC USE WITH LANTUS TWICE A  each 3    LANTUS SOLOSTAR 100 UNIT/ML injection pen INJECT 25 UNITS SUBCUTANEOUSLY TWICE A DAY 15 mL 3    hydroCHLOROthiazide (HYDRODIURIL) 25 MG tablet TAKE 1 TABLET BY MOUTH EVERY MORNING 90 tablet 0    ezetimibe (ZETIA) 10 MG tablet TAKE 1 TABLET BY MOUTH DAILY 30 tablet 3    atorvastatin (LIPITOR) 20 MG tablet TAKE 1 TABLET BY MOUTH DAILY 30 tablet 3    TRULICITY 1.5 CT/2.1OS SOPN INJECT 1.5MG SUBCUTANEOUSLY ONCE EVERY WEEK 2 mL 3    losartan (COZAAR) 100 MG tablet TAKE 1 TABLET BY MOUTH DAILY 30 tablet 3    Blood Pressure Monitoring (B-D ASSURE BPM/MANUAL ARM CUFF) MISC 1 applicator by Does not apply route 2 times daily Check BP twice daily 1 each 0    naproxen (NAPROSYN) 500 MG tablet Take 1 tablet by mouth 2 times daily as needed for Pain 180 tablet 3    hydrOXYzine (ATARAX) 25 MG tablet       blood glucose monitor kit and supplies Dispense all needed supplies to include: monitor, strips, lancing device, lancets, control solutions, alcohol swabs. 1 kit 0    Lancets MISC 1 each by Does not apply route 3 times daily 300 each 3    blood glucose monitor strips Test 3 times a day & as needed for symptoms of irregular blood glucose. Dispense sufficient amount for indicated testing frequency. 300 strip 3    albuterol sulfate  (90 Base) MCG/ACT inhaler Inhale 2 puffs into the lungs every 4 hours as needed for Wheezing or Shortness of Breath (cough) 1 Inhaler 3    OXcarbazepine (TRILEPTAL) 300 MG tablet Take 1 tablet by mouth daily       No current facility-administered medications for this visit.      Allergies   Allergen Reactions    Amoxicillin     Other      Moral mushrooms    Pcn [Penicillins]      Thinks pcn      Social History     Socioeconomic History  Marital status: Single     Spouse name: Not on file    Number of children: Not on file    Years of education: Not on file    Highest education level: Not on file   Occupational History    Not on file   Tobacco Use    Smoking status: Current Every Day Smoker     Types: Cigarettes    Smokeless tobacco: Current User    Tobacco comment: one pack a day   Vaping Use    Vaping Use: Never used   Substance and Sexual Activity    Alcohol use: Not Currently    Drug use: Not on file     Comment: 127 days sober    Sexual activity: Not on file   Other Topics Concern    Not on file   Social History Narrative    Not on file     Social Determinants of Health     Financial Resource Strain: Unknown    Difficulty of Paying Living Expenses: Patient refused   Food Insecurity: Unknown    Worried About [address] [place] [address] in the Last Year: Patient refused    Ran Out of Food in the Last Year: Patient refused   Transportation Needs:     Lack of Transportation (Medical): Not on file    Lack of Transportation (Non-Medical):  Not on file   Physical Activity:     Days of Exercise per Week: Not on file    Minutes of Exercise per Session: Not on file   Stress:     Feeling of Stress : Not on file   Social Connections:     Frequency of Communication with Friends and Family: Not on file    Frequency of Social Gatherings with Friends and Family: Not on file    Attends Yazidism Services: Not on file    Active Member of [address] [place] [address] [employer] or Organizations: Not on file    Attends Club or Organization Meetings: Not on file    Marital Status: Not on file   Intimate Partner Violence:     Fear of Current or Ex-Partner: Not on file    Emotionally Abused: Not on file    Physically Abused: Not on file    Sexually Abused: Not on file   Housing Stability:     Unable to Pay for Housing in the Last Year: Not on file    Number of [place] in the Last Year: Not on file    Unstable Housing in the Last Year: Not on file     Review of Systems  Pertinent items are noted in HPI. Physical Exam     BP (!) 162/108 (Site: Left Upper Arm, Position: Sitting, Cuff Size: Large Adult)   Pulse 88   Temp 97.8 °F (36.6 °C) (Skin)   Resp 16   Ht 5' 11\" (1.803 m)   Wt (!) 300 lb 9.6 oz (136.4 kg)   SpO2 97%   BMI 41.93 kg/m²   BP (!) 162/108 (Site: Left Upper Arm, Position: Sitting, Cuff Size: Large Adult)   Pulse 88   Temp 97.8 °F (36.6 °C) (Skin)   Resp 16   Ht 5' 11\" (1.803 m)   Wt (!) 300 lb 9.6 oz (136.4 kg)   SpO2 97%   BMI 41.93 kg/m²     General Appearance:    Alert, cooperative, no distress, appears stated age   Head:    Normocephalic, without obvious abnormality, atraumatic   Eyes:    PERRL, conjunctiva/corneas clear, EOM's intact, fundi     benign, both eyes        Ears:    Normal TM's and external ear canals, both ears   Nose:   Nares normal, septum midline, mucosa normal, no drainage    or sinus tenderness   Throat:   Lips, mucosa, and tongue normal; teeth and gums normal   Neck:   Supple, symmetrical, trachea midline, no adenopathy;        thyroid:  No enlargement/tenderness/nodules; no carotid    bruit or JVD   Back:     Symmetric, no curvature, ROM normal, no CVA tenderness   Lungs:     Clear to auscultation bilaterally, respirations unlabored   Chest wall:    No tenderness or deformity   Heart:    Regular rate and rhythm, S1 and S2 normal, no murmur, rub   or gallop   Abdomen:     Soft, non-tender, bowel sounds active all four quadrants,     no masses, no organomegaly   Genitalia:    Normal male without lesion, discharge or tenderness   Rectal:    Normal tone, normal prostate, no masses or tenderness;    guaiac negative stool   Extremities:   Extremities normal, atraumatic, no cyanosis or edema   Pulses:   2+ and symmetric all extremities   Skin:   Skin color, texture, turgor normal, no rashes or lesions   Lymph nodes:   Cervical, supraclavicular, and axillary nodes normal   Neurologic:   CNII-XII intact.  Normal strength, sensation and reflexes       throughout           Medications  Prior to Visit Medications    Medication Sig Taking?  Authorizing Provider   Alcohol Swabs (ALCOHOL PREP) 70 % PADS  Yes Historical Provider, MD   budesonide-formoterol (SYMBICORT) 160-4.5 MCG/ACT AERO INHALE 2 PUFFS BY MOUTH TWICE A DAY RINSE MOUTH AFTER USE Yes Kelle Allen MD   metoprolol succinate (TOPROL XL) 50 MG extended release tablet TAKE 1 TABLET BY MOUTH DAILY Yes Kelle Allen MD   Cholecalciferol (VITAMIN D3) 50 MCG (2000 UT) TABS TAKE 1 TABLET BY MOUTH DAILY Yes Kelle Allen MD   potassium chloride (KLOR-CON M) 20 MEQ extended release tablet TAKE 1 TABLET BY MOUTH DAILY Yes Kelle Allen MD   metFORMIN (GLUCOPHAGE) 1000 MG tablet TAKE 1 TABLET BY MOUTH TWICE A DAY WITH MEALS Yes Kelle Allen MD   citalopram (CELEXA) 10 MG tablet  Yes Historical Provider, MD   Insulin Pen Needle (ULTICARE SHORT PEN NEEDLES) 31G X 8 MM MISC USE WITH LANTUS TWICE A DAY Yes Kelle Allen MD   LANTUS SOLOSTAR 100 UNIT/ML injection pen INJECT 25 Maureen Abu Yes Kelle Allen MD   hydroCHLOROthiazide (HYDRODIURIL) 25 MG tablet TAKE 1 TABLET BY MOUTH EVERY MORNING Yes Kelle Allen MD   ezetimibe (ZETIA) 10 MG tablet TAKE 1 TABLET BY MOUTH DAILY Yes Kelle Allen MD   atorvastatin (LIPITOR) 20 MG tablet TAKE 1 TABLET BY MOUTH DAILY Yes Kelle Allen MD   TRULICITY 1.5 PU/5.2OJ SOPN INJECT 1.5MG SUBCUTANEOUSLY ONCE EVERY WEEK Yes Kelle Allen MD   losartan (COZAAR) 100 MG tablet TAKE 1 TABLET BY MOUTH DAILY Yes Kelle Allen MD   Blood Pressure Monitoring (B-D ASSURE BPM/MANUAL ARM CUFF) MISC 1 applicator by Does not apply route 2 times daily Check BP twice daily Yes Kelle Allen MD   naproxen (NAPROSYN) 500 MG tablet Take 1 tablet by mouth 2 times daily as needed for Pain Yes Kelle Allen MD   hydrOXYzine (ATARAX) 25 MG tablet  Yes Historical Provider, MD   blood glucose monitor kit and supplies Dispense all needed supplies to include: monitor, strips, lancing device, lancets, control solutions, alcohol swabs. Yes Nessa Davila MD   Lancets MISC 1 each by Does not apply route 3 times daily Yes Nessa Davila MD   blood glucose monitor strips Test 3 times a day & as needed for symptoms of irregular blood glucose. Dispense sufficient amount for indicated testing frequency. Yes Nessa Davila MD   albuterol sulfate  (90 Base) MCG/ACT inhaler Inhale 2 puffs into the lungs every 4 hours as needed for Wheezing or Shortness of Breath (cough) Yes Nessa Davila MD   OXcarbazepine (TRILEPTAL) 300 MG tablet Take 1 tablet by mouth daily Yes Historical Provider, MD        Allergies:  is allergic to amoxicillin, other, and pcn [penicillins]. Past Medical History:   has a past medical history of Bipolar 1 disorder ([facility] History of heroin abuse ([facility] Hypertension, and Personality disorder ([facility] Past Surgical History   has a past surgical history that includes knee surgery (Left) and Finger surgery (Left). Family History  family history includes Cancer in his father; Diabetes in his mother; Heart Disease in his father; High Blood Pressure in his father and mother; Kidney Disease in his mother. Social History   reports that he has been smoking cigarettes. He uses smokeless tobacco. He reports previous alcohol use.     Health Maintenance:    Health Maintenance   Topic Date Due    Hepatitis C screen  Never done    Pneumococcal 0-64 years Vaccine (1 of 2 - PPSV23) Never done    Diabetic foot exam  Never done    HIV screen  Never done    Diabetic retinal exam  Never done    Hepatitis B vaccine (1 of 3 - Risk 3-dose series) Never done    DTaP/Tdap/Td vaccine (1 - Tdap) 09/16/2014    Colorectal Cancer Screen  Never done    Flu vaccine (1) Never done    COVID-19 Vaccine (3 - Booster for Moderna series) 10/25/2021    Depression Screen  04/29/2022    A1C test (Diabetic or Prediabetic)  02/11/2023    Lipid screen  02/11/2023    Potassium monitoring  02/11/2023    Creatinine monitoring  02/11/2023    Hepatitis A vaccine  Aged Out    Hib vaccine  Aged Out    Meningococcal (ACWY) vaccine  Aged Out       Subjective:      Review of Systems   Constitutional: Negative for fatigue, fever and unexpected weight change. HENT: Negative for ear pain, postnasal drip, rhinorrhea, sinus pain, sore throat and trouble swallowing. Eyes: Negative for visual disturbance. Respiratory: Negative for cough, chest tightness and shortness of breath. Cardiovascular: Negative for chest pain and leg swelling. Gastrointestinal: Negative for abdominal pain, blood in stool and diarrhea. Endocrine: Negative for polyuria. Genitourinary: Negative for difficulty urinating and flank pain. Musculoskeletal: Positive for arthralgias. Negative for joint swelling and myalgias. Skin: Negative for rash. Allergic/Immunologic: Negative for environmental allergies. Neurological: Negative for weakness, light-headedness, numbness and headaches. Hematological: Negative for adenopathy. Psychiatric/Behavioral: Negative for behavioral problems and suicidal ideas. The patient is not nervous/anxious. Objective:     BP (!) 162/108 (Site: Left Upper Arm, Position: Sitting, Cuff Size: Large Adult)   Pulse 88   Temp 97.8 °F (36.6 °C) (Skin)   Resp 16   Ht 5' 11\" (1.803 m)   Wt (!) 300 lb 9.6 oz (136.4 kg)   SpO2 97%   BMI 41.93 kg/m²     Physical Exam  Vitals and nursing note reviewed. HENT:      Head: Normocephalic and atraumatic. Cardiovascular:      Rate and Rhythm: Normal rate and regular rhythm. Pulses: Normal pulses. Heart sounds: Normal heart sounds. Pulmonary:      Effort: Pulmonary effort is normal.      Breath sounds: Normal breath sounds. No stridor. Musculoskeletal:         General: Normal range of motion. Skin:     General: Skin is warm.    Neurological:      Mental Status: He is oriented to person, place, and time. Mental status is at baseline. Psychiatric:         Mood and Affect: Mood normal.             Assessment       Diagnosis Orders   1. Essential hypertension     2. Type 2 diabetes mellitus with diabetic neuropathy, with long-term current use of insulin (HCC)     3. Opioid dependence in remission ([facility])     4. Class 2 severe obesity due to excess calories with serious comorbidity and body mass index (BMI) of 35.0 to 35.9 in adult ([facility]     5. Chronic midline low back pain without sciatica     6. Hypertriglyceridemia     7. Hypokalemia     8. Vitamin D deficiency     9. Moderate persistent asthma, unspecified whether complicated     10. Type 2 diabetes mellitus with microalbuminuria, with long-term current use of insulin ([facility]           PLAN   Now discussed at length importance of glycemic control/lifestyle changes. Discussed bit on maybe switching his Trulicity to Ozempic can help with weight loss as well maybe considering adding SGLT2 inhibitor. Also encourage Home blood pressure monitoring. Will re-establish with Dr Kunal Ballard and discuss more. No orders of the defined types were placed in this encounter. No follow-ups on file. Patient given educational materials - see patient instructions. Discussed use, benefit, and side effects of prescribed medications. All patient questions answered. Pt voiced understanding. Reviewed health maintenance.        Electronically signed Lashon Hawkins MD on 2/15/2022 at 12:47 PM EST

## 2022-02-16 ASSESSMENT — ENCOUNTER SYMPTOMS
BLOOD IN STOOL: 0
COUGH: 0
TROUBLE SWALLOWING: 0
CHEST TIGHTNESS: 0
RHINORRHEA: 0
ABDOMINAL PAIN: 0
SORE THROAT: 0
SHORTNESS OF BREATH: 0
SINUS PAIN: 0
DIARRHEA: 0

## 2022-02-17 DIAGNOSIS — E11.29 TYPE 2 DIABETES MELLITUS WITH MICROALBUMINURIA, WITH LONG-TERM CURRENT USE OF INSULIN (HCC): ICD-10-CM

## 2022-02-17 DIAGNOSIS — R21 RASH: ICD-10-CM

## 2022-02-17 DIAGNOSIS — E55.9 VITAMIN D DEFICIENCY: ICD-10-CM

## 2022-02-17 DIAGNOSIS — I10 ESSENTIAL HYPERTENSION: ICD-10-CM

## 2022-02-17 DIAGNOSIS — M54.41 CHRONIC BILATERAL LOW BACK PAIN WITH RIGHT-SIDED SCIATICA: ICD-10-CM

## 2022-02-17 DIAGNOSIS — F14.10 COCAINE ABUSE (HCC): ICD-10-CM

## 2022-02-17 DIAGNOSIS — R06.2 WHEEZING: ICD-10-CM

## 2022-02-17 DIAGNOSIS — M25.561 CHRONIC PAIN OF RIGHT KNEE: ICD-10-CM

## 2022-02-17 DIAGNOSIS — R80.9 TYPE 2 DIABETES MELLITUS WITH MICROALBUMINURIA, WITH LONG-TERM CURRENT USE OF INSULIN (HCC): ICD-10-CM

## 2022-02-17 DIAGNOSIS — G89.29 CHRONIC BILATERAL LOW BACK PAIN WITH RIGHT-SIDED SCIATICA: ICD-10-CM

## 2022-02-17 DIAGNOSIS — B35.9 RINGWORM: ICD-10-CM

## 2022-02-17 DIAGNOSIS — E87.6 HYPOKALEMIA: ICD-10-CM

## 2022-02-17 DIAGNOSIS — J45.40 MODERATE PERSISTENT ASTHMA, UNSPECIFIED WHETHER COMPLICATED: ICD-10-CM

## 2022-02-17 DIAGNOSIS — L60.0 INGROWING NAIL, RIGHT GREAT TOE: ICD-10-CM

## 2022-02-17 DIAGNOSIS — G89.29 OTHER CHRONIC PAIN: ICD-10-CM

## 2022-02-17 DIAGNOSIS — E78.2 MIXED HYPERLIPIDEMIA: ICD-10-CM

## 2022-02-17 DIAGNOSIS — Z79.4 TYPE 2 DIABETES MELLITUS WITH MICROALBUMINURIA, WITH LONG-TERM CURRENT USE OF INSULIN (HCC): ICD-10-CM

## 2022-02-17 DIAGNOSIS — F17.200 SMOKER: ICD-10-CM

## 2022-02-17 DIAGNOSIS — R35.89 POLYURIA: ICD-10-CM

## 2022-02-17 DIAGNOSIS — R63.1 POLYDIPSIA: ICD-10-CM

## 2022-02-17 DIAGNOSIS — F11.21 OPIOID DEPENDENCE IN REMISSION (HCC): ICD-10-CM

## 2022-02-17 DIAGNOSIS — G89.29 CHRONIC PAIN OF RIGHT KNEE: ICD-10-CM

## 2022-02-17 DIAGNOSIS — B35.3 TINEA PEDIS OF LEFT FOOT: ICD-10-CM

## 2022-02-17 DIAGNOSIS — J40 BRONCHITIS: ICD-10-CM

## 2022-02-17 DIAGNOSIS — M54.2 NECK PAIN: ICD-10-CM

## 2022-02-17 DIAGNOSIS — E78.1 HYPERTRIGLYCERIDEMIA: ICD-10-CM

## 2022-02-22 DIAGNOSIS — E87.6 HYPOKALEMIA: ICD-10-CM

## 2022-02-22 DIAGNOSIS — R63.1 POLYDIPSIA: ICD-10-CM

## 2022-02-22 DIAGNOSIS — L60.0 INGROWING NAIL, RIGHT GREAT TOE: ICD-10-CM

## 2022-02-22 DIAGNOSIS — M25.561 CHRONIC PAIN OF RIGHT KNEE: ICD-10-CM

## 2022-02-22 DIAGNOSIS — R35.89 POLYURIA: ICD-10-CM

## 2022-02-22 DIAGNOSIS — J40 BRONCHITIS: ICD-10-CM

## 2022-02-22 DIAGNOSIS — F14.10 COCAINE ABUSE (HCC): ICD-10-CM

## 2022-02-22 DIAGNOSIS — I10 ESSENTIAL HYPERTENSION: ICD-10-CM

## 2022-02-22 DIAGNOSIS — R21 RASH: ICD-10-CM

## 2022-02-22 DIAGNOSIS — M54.2 NECK PAIN: ICD-10-CM

## 2022-02-22 DIAGNOSIS — R80.9 TYPE 2 DIABETES MELLITUS WITH MICROALBUMINURIA, WITH LONG-TERM CURRENT USE OF INSULIN (HCC): ICD-10-CM

## 2022-02-22 DIAGNOSIS — R06.2 WHEEZING: ICD-10-CM

## 2022-02-22 DIAGNOSIS — E78.1 HYPERTRIGLYCERIDEMIA: ICD-10-CM

## 2022-02-22 DIAGNOSIS — Z79.4 TYPE 2 DIABETES MELLITUS WITH MICROALBUMINURIA, WITH LONG-TERM CURRENT USE OF INSULIN (HCC): ICD-10-CM

## 2022-02-22 DIAGNOSIS — F17.200 SMOKER: ICD-10-CM

## 2022-02-22 DIAGNOSIS — G89.29 OTHER CHRONIC PAIN: ICD-10-CM

## 2022-02-22 DIAGNOSIS — J45.40 MODERATE PERSISTENT ASTHMA, UNSPECIFIED WHETHER COMPLICATED: ICD-10-CM

## 2022-02-22 DIAGNOSIS — E55.9 VITAMIN D DEFICIENCY: ICD-10-CM

## 2022-02-22 DIAGNOSIS — F11.21 OPIOID DEPENDENCE IN REMISSION (HCC): ICD-10-CM

## 2022-02-22 DIAGNOSIS — E78.2 MIXED HYPERLIPIDEMIA: ICD-10-CM

## 2022-02-22 DIAGNOSIS — E11.29 TYPE 2 DIABETES MELLITUS WITH MICROALBUMINURIA, WITH LONG-TERM CURRENT USE OF INSULIN (HCC): ICD-10-CM

## 2022-02-22 DIAGNOSIS — B35.3 TINEA PEDIS OF LEFT FOOT: ICD-10-CM

## 2022-02-22 DIAGNOSIS — B35.9 RINGWORM: ICD-10-CM

## 2022-02-22 DIAGNOSIS — M54.41 CHRONIC BILATERAL LOW BACK PAIN WITH RIGHT-SIDED SCIATICA: ICD-10-CM

## 2022-02-22 DIAGNOSIS — G89.29 CHRONIC BILATERAL LOW BACK PAIN WITH RIGHT-SIDED SCIATICA: ICD-10-CM

## 2022-02-22 DIAGNOSIS — G89.29 CHRONIC PAIN OF RIGHT KNEE: ICD-10-CM

## 2022-02-22 RX ORDER — DULAGLUTIDE 1.5 MG/.5ML
INJECTION, SOLUTION SUBCUTANEOUS
Qty: 2 ML | Refills: 3 | OUTPATIENT
Start: 2022-02-22

## 2022-02-22 RX ORDER — DULAGLUTIDE 1.5 MG/.5ML
INJECTION, SOLUTION SUBCUTANEOUS
Qty: 2 ML | Refills: 3 | Status: SHIPPED | OUTPATIENT
Start: 2022-02-22 | End: 2022-04-12 | Stop reason: SDUPTHER

## 2022-02-22 NOTE — TELEPHONE ENCOUNTER
Selma Solitario is requesting a refill on the following medication(s):  Requested Prescriptions     Pending Prescriptions Disp Refills    Dulaglutide (TRULICITY) 1.5 SH/3.2TQ SOPN 2 mL 3     Sig: INJECT 1.5MG SUBCUTANEOUSLY ONCE EVERY WEEK       Last Visit Date (If Applicable):  0/72/3840    Next Visit Date:    3/21/2022

## 2022-02-24 DIAGNOSIS — E11.29 TYPE 2 DIABETES MELLITUS WITH MICROALBUMINURIA, WITH LONG-TERM CURRENT USE OF INSULIN (HCC): ICD-10-CM

## 2022-02-24 DIAGNOSIS — Z79.4 TYPE 2 DIABETES MELLITUS WITH MICROALBUMINURIA, WITH LONG-TERM CURRENT USE OF INSULIN (HCC): ICD-10-CM

## 2022-02-24 DIAGNOSIS — R80.9 TYPE 2 DIABETES MELLITUS WITH MICROALBUMINURIA, WITH LONG-TERM CURRENT USE OF INSULIN (HCC): ICD-10-CM

## 2022-02-24 DIAGNOSIS — I10 ESSENTIAL HYPERTENSION: ICD-10-CM

## 2022-02-25 RX ORDER — HYDROCHLOROTHIAZIDE 25 MG/1
25 TABLET ORAL EVERY MORNING
Qty: 30 TABLET | Refills: 2 | Status: SHIPPED | OUTPATIENT
Start: 2022-02-25 | End: 2022-04-12 | Stop reason: SDUPTHER

## 2022-02-25 RX ORDER — LOSARTAN POTASSIUM 100 MG/1
100 TABLET ORAL DAILY
Qty: 30 TABLET | Refills: 3 | Status: SHIPPED | OUTPATIENT
Start: 2022-02-25 | End: 2022-04-12 | Stop reason: SDUPTHER

## 2022-02-25 NOTE — TELEPHONE ENCOUNTER
Abigail Ivan is requesting a refill on the following medication(s):  Requested Prescriptions     Pending Prescriptions Disp Refills    hydroCHLOROthiazide (HYDRODIURIL) 25 MG tablet [Pharmacy Med Name: hydroCHLOROthiazide 25MG TABS*] 30 tablet      Sig: TAKE 1 TABLET BY MOUTH EVERY MORNING    losartan (COZAAR) 100 MG tablet [Pharmacy Med Name: Losartan Potassium 100MG TABS] 30 tablet 3     Sig: TAKE 1 TABLET BY MOUTH DAILY       Last Visit Date (If Applicable):  6/82/2931    Next Visit Date:    3/21/2022

## 2022-03-08 DIAGNOSIS — I10 ESSENTIAL HYPERTENSION: ICD-10-CM

## 2022-03-08 DIAGNOSIS — M54.41 CHRONIC BILATERAL LOW BACK PAIN WITH RIGHT-SIDED SCIATICA: ICD-10-CM

## 2022-03-08 DIAGNOSIS — G89.29 CHRONIC BILATERAL LOW BACK PAIN WITH RIGHT-SIDED SCIATICA: ICD-10-CM

## 2022-03-08 DIAGNOSIS — Z79.4 TYPE 2 DIABETES MELLITUS WITH MICROALBUMINURIA, WITH LONG-TERM CURRENT USE OF INSULIN (HCC): ICD-10-CM

## 2022-03-08 DIAGNOSIS — E87.6 HYPOKALEMIA: ICD-10-CM

## 2022-03-08 DIAGNOSIS — J45.40 MODERATE PERSISTENT ASTHMA, UNSPECIFIED WHETHER COMPLICATED: ICD-10-CM

## 2022-03-08 DIAGNOSIS — B35.9 RINGWORM: ICD-10-CM

## 2022-03-08 DIAGNOSIS — G89.29 CHRONIC PAIN OF RIGHT KNEE: ICD-10-CM

## 2022-03-08 DIAGNOSIS — R06.2 WHEEZING: ICD-10-CM

## 2022-03-08 DIAGNOSIS — F17.200 SMOKER: ICD-10-CM

## 2022-03-08 DIAGNOSIS — J40 BRONCHITIS: ICD-10-CM

## 2022-03-08 DIAGNOSIS — M25.561 CHRONIC PAIN OF RIGHT KNEE: ICD-10-CM

## 2022-03-08 DIAGNOSIS — E55.9 VITAMIN D DEFICIENCY: ICD-10-CM

## 2022-03-08 DIAGNOSIS — F11.21 OPIOID DEPENDENCE IN REMISSION (HCC): ICD-10-CM

## 2022-03-08 DIAGNOSIS — B35.3 TINEA PEDIS OF LEFT FOOT: ICD-10-CM

## 2022-03-08 DIAGNOSIS — R80.9 TYPE 2 DIABETES MELLITUS WITH MICROALBUMINURIA, WITH LONG-TERM CURRENT USE OF INSULIN (HCC): ICD-10-CM

## 2022-03-08 DIAGNOSIS — L60.0 INGROWING NAIL, RIGHT GREAT TOE: ICD-10-CM

## 2022-03-08 DIAGNOSIS — E78.1 HYPERTRIGLYCERIDEMIA: ICD-10-CM

## 2022-03-08 DIAGNOSIS — R35.89 POLYURIA: ICD-10-CM

## 2022-03-08 DIAGNOSIS — E11.29 TYPE 2 DIABETES MELLITUS WITH MICROALBUMINURIA, WITH LONG-TERM CURRENT USE OF INSULIN (HCC): ICD-10-CM

## 2022-03-08 DIAGNOSIS — F14.10 COCAINE ABUSE (HCC): ICD-10-CM

## 2022-03-08 DIAGNOSIS — R21 RASH: ICD-10-CM

## 2022-03-08 DIAGNOSIS — E78.2 MIXED HYPERLIPIDEMIA: ICD-10-CM

## 2022-03-08 DIAGNOSIS — G89.29 OTHER CHRONIC PAIN: ICD-10-CM

## 2022-03-08 DIAGNOSIS — M54.2 NECK PAIN: ICD-10-CM

## 2022-03-08 DIAGNOSIS — R63.1 POLYDIPSIA: ICD-10-CM

## 2022-03-08 RX ORDER — EZETIMIBE 10 MG/1
10 TABLET ORAL DAILY
Qty: 30 TABLET | Refills: 3 | Status: SHIPPED | OUTPATIENT
Start: 2022-03-08

## 2022-03-08 RX ORDER — POTASSIUM CHLORIDE 20 MEQ/1
20 TABLET, EXTENDED RELEASE ORAL DAILY
Qty: 30 TABLET | Refills: 1 | Status: SHIPPED | OUTPATIENT
Start: 2022-03-08 | End: 2022-05-24

## 2022-03-08 RX ORDER — NAPROXEN 500 MG/1
TABLET ORAL
Qty: 60 TABLET | Refills: 0 | Status: SHIPPED | OUTPATIENT
Start: 2022-03-08 | End: 2022-05-24

## 2022-03-08 NOTE — TELEPHONE ENCOUNTER
Gentry Farmer is requesting a refill on the following medication(s):  Requested Prescriptions     Pending Prescriptions Disp Refills    ezetimibe (ZETIA) 10 MG tablet [Pharmacy Med Name: Ezetimibe 10MG TABS] 30 tablet 3     Sig: TAKE 1 TABLET BY MOUTH DAILY    naproxen (NAPROSYN) 500 MG tablet [Pharmacy Med Name: Naproxen 500MG TABS] 60 tablet      Sig: TAKE 1 TABLET BY MOUTH TWICE A DAY AS NEEDED FOR PAIN    potassium chloride (KLOR-CON M) 20 MEQ extended release tablet [Pharmacy Med Name: Potassium Chloride [place] ER 20MEQ TBCR] 30 tablet 1     Sig: TAKE 1 TABLET BY MOUTH DAILY       Last Visit Date (If Applicable):  9/98/7811    Next Visit Date:    3/21/2022

## 2022-03-25 DIAGNOSIS — I10 ESSENTIAL HYPERTENSION: ICD-10-CM

## 2022-03-25 DIAGNOSIS — J45.40 MODERATE PERSISTENT ASTHMA, UNSPECIFIED WHETHER COMPLICATED: ICD-10-CM

## 2022-03-25 DIAGNOSIS — E11.29 TYPE 2 DIABETES MELLITUS WITH MICROALBUMINURIA, WITH LONG-TERM CURRENT USE OF INSULIN (HCC): ICD-10-CM

## 2022-03-25 DIAGNOSIS — G89.29 CHRONIC PAIN OF RIGHT KNEE: ICD-10-CM

## 2022-03-25 DIAGNOSIS — F14.10 COCAINE ABUSE (HCC): ICD-10-CM

## 2022-03-25 DIAGNOSIS — R06.2 WHEEZING: ICD-10-CM

## 2022-03-25 DIAGNOSIS — M54.2 NECK PAIN: ICD-10-CM

## 2022-03-25 DIAGNOSIS — R21 RASH: ICD-10-CM

## 2022-03-25 DIAGNOSIS — L60.0 INGROWING NAIL, RIGHT GREAT TOE: ICD-10-CM

## 2022-03-25 DIAGNOSIS — G89.29 OTHER CHRONIC PAIN: ICD-10-CM

## 2022-03-25 DIAGNOSIS — Z79.4 TYPE 2 DIABETES MELLITUS WITH MICROALBUMINURIA, WITH LONG-TERM CURRENT USE OF INSULIN (HCC): ICD-10-CM

## 2022-03-25 DIAGNOSIS — G89.29 CHRONIC BILATERAL LOW BACK PAIN WITH RIGHT-SIDED SCIATICA: ICD-10-CM

## 2022-03-25 DIAGNOSIS — R35.89 POLYURIA: ICD-10-CM

## 2022-03-25 DIAGNOSIS — R63.1 POLYDIPSIA: ICD-10-CM

## 2022-03-25 DIAGNOSIS — B35.3 TINEA PEDIS OF LEFT FOOT: ICD-10-CM

## 2022-03-25 DIAGNOSIS — R80.9 TYPE 2 DIABETES MELLITUS WITH MICROALBUMINURIA, WITH LONG-TERM CURRENT USE OF INSULIN (HCC): ICD-10-CM

## 2022-03-25 DIAGNOSIS — E78.1 HYPERTRIGLYCERIDEMIA: ICD-10-CM

## 2022-03-25 DIAGNOSIS — J40 BRONCHITIS: ICD-10-CM

## 2022-03-25 DIAGNOSIS — E55.9 VITAMIN D DEFICIENCY: ICD-10-CM

## 2022-03-25 DIAGNOSIS — F11.21 OPIOID DEPENDENCE IN REMISSION (HCC): ICD-10-CM

## 2022-03-25 DIAGNOSIS — E87.6 HYPOKALEMIA: ICD-10-CM

## 2022-03-25 DIAGNOSIS — M54.41 CHRONIC BILATERAL LOW BACK PAIN WITH RIGHT-SIDED SCIATICA: ICD-10-CM

## 2022-03-25 DIAGNOSIS — F17.200 SMOKER: ICD-10-CM

## 2022-03-25 DIAGNOSIS — B35.9 RINGWORM: ICD-10-CM

## 2022-03-25 DIAGNOSIS — M25.561 CHRONIC PAIN OF RIGHT KNEE: ICD-10-CM

## 2022-03-25 RX ORDER — CHOLECALCIFEROL (VITAMIN D3) 125 MCG
CAPSULE ORAL
Qty: 30 TABLET | Refills: 1 | Status: SHIPPED | OUTPATIENT
Start: 2022-03-25

## 2022-03-25 NOTE — TELEPHONE ENCOUNTER
Gentry Farmer is requesting a refill on the following medication(s):  Requested Prescriptions     Pending Prescriptions Disp Refills    Cholecalciferol (VITAMIN D3) 50 MCG (2000 UT) TABS [Pharmacy Med Name: Vitamin D3 Super Strength 50 MCG(2000 UT) TABS] 30 tablet 1     Sig: TAKE 1 TABLET BY MOUTH DAILY       Last Visit Date (If Applicable):  4/48/6029    Next Visit Date:    4/12/2022

## 2022-03-31 DIAGNOSIS — B35.3 TINEA PEDIS OF LEFT FOOT: ICD-10-CM

## 2022-03-31 DIAGNOSIS — R63.1 POLYDIPSIA: ICD-10-CM

## 2022-03-31 DIAGNOSIS — M25.561 CHRONIC PAIN OF RIGHT KNEE: ICD-10-CM

## 2022-03-31 DIAGNOSIS — F14.10 COCAINE ABUSE (HCC): ICD-10-CM

## 2022-03-31 DIAGNOSIS — J40 BRONCHITIS: ICD-10-CM

## 2022-03-31 DIAGNOSIS — E78.1 HYPERTRIGLYCERIDEMIA: ICD-10-CM

## 2022-03-31 DIAGNOSIS — I10 ESSENTIAL HYPERTENSION: ICD-10-CM

## 2022-03-31 DIAGNOSIS — R21 RASH: ICD-10-CM

## 2022-03-31 DIAGNOSIS — Z79.4 TYPE 2 DIABETES MELLITUS WITH MICROALBUMINURIA, WITH LONG-TERM CURRENT USE OF INSULIN (HCC): ICD-10-CM

## 2022-03-31 DIAGNOSIS — M54.41 CHRONIC BILATERAL LOW BACK PAIN WITH RIGHT-SIDED SCIATICA: ICD-10-CM

## 2022-03-31 DIAGNOSIS — R35.89 POLYURIA: ICD-10-CM

## 2022-03-31 DIAGNOSIS — R80.9 TYPE 2 DIABETES MELLITUS WITH MICROALBUMINURIA, WITH LONG-TERM CURRENT USE OF INSULIN (HCC): ICD-10-CM

## 2022-03-31 DIAGNOSIS — G89.29 CHRONIC PAIN OF RIGHT KNEE: ICD-10-CM

## 2022-03-31 DIAGNOSIS — F17.200 SMOKER: ICD-10-CM

## 2022-03-31 DIAGNOSIS — E11.29 TYPE 2 DIABETES MELLITUS WITH MICROALBUMINURIA, WITH LONG-TERM CURRENT USE OF INSULIN (HCC): ICD-10-CM

## 2022-03-31 DIAGNOSIS — B35.9 RINGWORM: ICD-10-CM

## 2022-03-31 DIAGNOSIS — G89.29 CHRONIC BILATERAL LOW BACK PAIN WITH RIGHT-SIDED SCIATICA: ICD-10-CM

## 2022-03-31 DIAGNOSIS — E55.9 VITAMIN D DEFICIENCY: ICD-10-CM

## 2022-03-31 DIAGNOSIS — G89.29 OTHER CHRONIC PAIN: ICD-10-CM

## 2022-03-31 DIAGNOSIS — R06.2 WHEEZING: ICD-10-CM

## 2022-03-31 DIAGNOSIS — E87.6 HYPOKALEMIA: ICD-10-CM

## 2022-03-31 DIAGNOSIS — F11.21 OPIOID DEPENDENCE IN REMISSION (HCC): ICD-10-CM

## 2022-03-31 DIAGNOSIS — J45.40 MODERATE PERSISTENT ASTHMA, UNSPECIFIED WHETHER COMPLICATED: ICD-10-CM

## 2022-03-31 DIAGNOSIS — L60.0 INGROWING NAIL, RIGHT GREAT TOE: ICD-10-CM

## 2022-03-31 DIAGNOSIS — M54.2 NECK PAIN: ICD-10-CM

## 2022-03-31 RX ORDER — INSULIN GLARGINE 100 [IU]/ML
INJECTION, SOLUTION SUBCUTANEOUS
Qty: 15 ML | Refills: 3 | Status: SHIPPED | OUTPATIENT
Start: 2022-03-31 | End: 2022-05-17 | Stop reason: SDUPTHER

## 2022-03-31 NOTE — TELEPHONE ENCOUNTER
Jeramy Sweeney is requesting a refill on the following medication(s):  Requested Prescriptions     Pending Prescriptions Disp Refills    LANTUS SOLOSTAR 100 UNIT/ML injection pen [Pharmacy Med Name: Lantus SoloStar 100UNIT/ML SOPN] 15 mL 3     Sig: INJECT 25 UNITS SUBCUTANEOUSLY TWICE A DAY       Last Visit Date (If Applicable):  7/93/2117    Next Visit Date:    4/12/2022

## 2022-04-07 DIAGNOSIS — E11.29 TYPE 2 DIABETES MELLITUS WITH MICROALBUMINURIA, WITH LONG-TERM CURRENT USE OF INSULIN (HCC): Primary | ICD-10-CM

## 2022-04-07 DIAGNOSIS — R80.9 TYPE 2 DIABETES MELLITUS WITH MICROALBUMINURIA, WITH LONG-TERM CURRENT USE OF INSULIN (HCC): Primary | ICD-10-CM

## 2022-04-07 DIAGNOSIS — Z79.4 TYPE 2 DIABETES MELLITUS WITH MICROALBUMINURIA, WITH LONG-TERM CURRENT USE OF INSULIN (HCC): Primary | ICD-10-CM

## 2022-04-07 RX ORDER — UBIQUINOL 100 MG
CAPSULE ORAL
Qty: 100 EACH | Refills: 0 | Status: SHIPPED | OUTPATIENT
Start: 2022-04-07 | End: 2022-04-12

## 2022-04-07 NOTE — TELEPHONE ENCOUNTER
Floreen Opitz is requesting a refill on the following medication(s):  Requested Prescriptions     Pending Prescriptions Disp Refills    Alcohol Swabs (ALCOHOL PREP) 70 % PADS 100 each 0     Sig: 3 times daily       Last Visit Date (If Applicable):  4/2/9232    Next Visit Date:    Visit date not found

## 2022-04-12 ENCOUNTER — OFFICE VISIT (OUTPATIENT)
Dept: FAMILY MEDICINE CLINIC | Age: 47
End: 2022-04-12
Payer: COMMERCIAL

## 2022-04-12 VITALS
WEIGHT: 300.8 LBS | DIASTOLIC BLOOD PRESSURE: 76 MMHG | TEMPERATURE: 96.4 F | HEIGHT: 71 IN | OXYGEN SATURATION: 96 % | RESPIRATION RATE: 16 BRPM | SYSTOLIC BLOOD PRESSURE: 112 MMHG | HEART RATE: 97 BPM | BODY MASS INDEX: 42.11 KG/M2

## 2022-04-12 DIAGNOSIS — E55.9 VITAMIN D DEFICIENCY: ICD-10-CM

## 2022-04-12 DIAGNOSIS — J45.30 MILD PERSISTENT ASTHMA WITHOUT COMPLICATION: ICD-10-CM

## 2022-04-12 DIAGNOSIS — G89.29 CHRONIC BILATERAL LOW BACK PAIN WITH RIGHT-SIDED SCIATICA: ICD-10-CM

## 2022-04-12 DIAGNOSIS — Z11.4 ENCOUNTER FOR SCREENING FOR HIV: ICD-10-CM

## 2022-04-12 DIAGNOSIS — R80.9 PROTEINURIA, UNSPECIFIED TYPE: ICD-10-CM

## 2022-04-12 DIAGNOSIS — L60.0 INGROWING NAIL, RIGHT GREAT TOE: ICD-10-CM

## 2022-04-12 DIAGNOSIS — G89.29 OTHER CHRONIC PAIN: ICD-10-CM

## 2022-04-12 DIAGNOSIS — B35.9 RINGWORM: ICD-10-CM

## 2022-04-12 DIAGNOSIS — F14.10 COCAINE ABUSE (HCC): ICD-10-CM

## 2022-04-12 DIAGNOSIS — J45.40 MODERATE PERSISTENT ASTHMA, UNSPECIFIED WHETHER COMPLICATED: ICD-10-CM

## 2022-04-12 DIAGNOSIS — Z12.5 PROSTATE CANCER SCREENING: ICD-10-CM

## 2022-04-12 DIAGNOSIS — B35.3 TINEA PEDIS OF LEFT FOOT: ICD-10-CM

## 2022-04-12 DIAGNOSIS — E78.2 MIXED HYPERLIPIDEMIA: ICD-10-CM

## 2022-04-12 DIAGNOSIS — R10.32 LLQ ABDOMINAL PAIN: ICD-10-CM

## 2022-04-12 DIAGNOSIS — F17.200 SMOKER: ICD-10-CM

## 2022-04-12 DIAGNOSIS — Z23 NEED FOR TDAP VACCINATION: ICD-10-CM

## 2022-04-12 DIAGNOSIS — E78.1 HYPERTRIGLYCERIDEMIA: ICD-10-CM

## 2022-04-12 DIAGNOSIS — Z79.4 TYPE 2 DIABETES MELLITUS WITH MICROALBUMINURIA, WITH LONG-TERM CURRENT USE OF INSULIN (HCC): ICD-10-CM

## 2022-04-12 DIAGNOSIS — Z00.00 WELLNESS EXAMINATION: ICD-10-CM

## 2022-04-12 DIAGNOSIS — E11.29 TYPE 2 DIABETES MELLITUS WITH MICROALBUMINURIA, WITH LONG-TERM CURRENT USE OF INSULIN (HCC): ICD-10-CM

## 2022-04-12 DIAGNOSIS — F11.21 OPIOID DEPENDENCE IN REMISSION (HCC): ICD-10-CM

## 2022-04-12 DIAGNOSIS — E87.6 HYPOKALEMIA: ICD-10-CM

## 2022-04-12 DIAGNOSIS — Z11.59 ENCOUNTER FOR HEPATITIS C SCREENING TEST FOR LOW RISK PATIENT: ICD-10-CM

## 2022-04-12 DIAGNOSIS — M54.41 CHRONIC BILATERAL LOW BACK PAIN WITH RIGHT-SIDED SCIATICA: ICD-10-CM

## 2022-04-12 DIAGNOSIS — R80.9 TYPE 2 DIABETES MELLITUS WITH MICROALBUMINURIA, WITH LONG-TERM CURRENT USE OF INSULIN (HCC): ICD-10-CM

## 2022-04-12 DIAGNOSIS — I10 ESSENTIAL HYPERTENSION: Primary | ICD-10-CM

## 2022-04-12 PROCEDURE — 4004F PT TOBACCO SCREEN RCVD TLK: CPT | Performed by: FAMILY MEDICINE

## 2022-04-12 PROCEDURE — 2022F DILAT RTA XM EVC RTNOPTHY: CPT | Performed by: FAMILY MEDICINE

## 2022-04-12 PROCEDURE — PBSHW TDAP (AGE 10Y AND OLDER) IM (BOOSTRIX): Performed by: FAMILY MEDICINE

## 2022-04-12 PROCEDURE — G8427 DOCREV CUR MEDS BY ELIG CLIN: HCPCS | Performed by: FAMILY MEDICINE

## 2022-04-12 PROCEDURE — G8417 CALC BMI ABV UP PARAM F/U: HCPCS | Performed by: FAMILY MEDICINE

## 2022-04-12 PROCEDURE — PBSHW POCT GLYCOSYLATED HEMOGLOBIN (HGB A1C): Performed by: FAMILY MEDICINE

## 2022-04-12 PROCEDURE — 99213 OFFICE O/P EST LOW 20 MIN: CPT | Performed by: FAMILY MEDICINE

## 2022-04-12 PROCEDURE — 3046F HEMOGLOBIN A1C LEVEL >9.0%: CPT | Performed by: FAMILY MEDICINE

## 2022-04-12 PROCEDURE — 83036 HEMOGLOBIN GLYCOSYLATED A1C: CPT | Performed by: FAMILY MEDICINE

## 2022-04-12 PROCEDURE — 99214 OFFICE O/P EST MOD 30 MIN: CPT | Performed by: FAMILY MEDICINE

## 2022-04-12 PROCEDURE — 90715 TDAP VACCINE 7 YRS/> IM: CPT | Performed by: FAMILY MEDICINE

## 2022-04-12 RX ORDER — ATORVASTATIN CALCIUM 20 MG/1
20 TABLET, FILM COATED ORAL DAILY
Qty: 30 TABLET | Refills: 3 | Status: SHIPPED | OUTPATIENT
Start: 2022-04-12

## 2022-04-12 RX ORDER — HYDROCHLOROTHIAZIDE 25 MG/1
25 TABLET ORAL EVERY MORNING
Qty: 30 TABLET | Refills: 2 | Status: SHIPPED | OUTPATIENT
Start: 2022-04-12

## 2022-04-12 RX ORDER — ALBUTEROL SULFATE 90 UG/1
2 AEROSOL, METERED RESPIRATORY (INHALATION) EVERY 4 HOURS PRN
Qty: 3 EACH | Refills: 3 | Status: SHIPPED | OUTPATIENT
Start: 2022-04-12

## 2022-04-12 RX ORDER — DULAGLUTIDE 1.5 MG/.5ML
INJECTION, SOLUTION SUBCUTANEOUS
Qty: 2 ML | Refills: 3 | Status: SHIPPED | OUTPATIENT
Start: 2022-04-12 | End: 2022-05-17 | Stop reason: SDUPTHER

## 2022-04-12 RX ORDER — BUDESONIDE AND FORMOTEROL FUMARATE DIHYDRATE 160; 4.5 UG/1; UG/1
AEROSOL RESPIRATORY (INHALATION)
Qty: 10.2 G | Refills: 3 | Status: SHIPPED | OUTPATIENT
Start: 2022-04-12

## 2022-04-12 RX ORDER — KETOCONAZOLE 20 MG/G
CREAM TOPICAL
Qty: 30 G | Refills: 1 | Status: SHIPPED | OUTPATIENT
Start: 2022-04-12

## 2022-04-12 RX ORDER — METOPROLOL SUCCINATE 50 MG/1
50 TABLET, EXTENDED RELEASE ORAL DAILY
Qty: 90 TABLET | Refills: 1 | Status: SHIPPED | OUTPATIENT
Start: 2022-04-12

## 2022-04-12 RX ORDER — LOSARTAN POTASSIUM 100 MG/1
100 TABLET ORAL DAILY
Qty: 30 TABLET | Refills: 3 | Status: SHIPPED | OUTPATIENT
Start: 2022-04-12 | End: 2022-10-27 | Stop reason: SDUPTHER

## 2022-04-12 RX ORDER — UBIQUINOL 100 MG
CAPSULE ORAL
Qty: 100 EACH | Refills: 3 | Status: SHIPPED | OUTPATIENT
Start: 2022-04-12

## 2022-04-12 SDOH — ECONOMIC STABILITY: FOOD INSECURITY: WITHIN THE PAST 12 MONTHS, THE FOOD YOU BOUGHT JUST DIDN'T LAST AND YOU DIDN'T HAVE MONEY TO GET MORE.: NEVER TRUE

## 2022-04-12 SDOH — ECONOMIC STABILITY: FOOD INSECURITY: WITHIN THE PAST 12 MONTHS, YOU WORRIED THAT YOUR FOOD WOULD RUN OUT BEFORE YOU GOT MONEY TO BUY MORE.: NEVER TRUE

## 2022-04-12 ASSESSMENT — ENCOUNTER SYMPTOMS
ABDOMINAL PAIN: 0
SHORTNESS OF BREATH: 0
WHEEZING: 1
PHOTOPHOBIA: 0
CHEST TIGHTNESS: 0
COUGH: 0

## 2022-04-12 ASSESSMENT — PATIENT HEALTH QUESTIONNAIRE - PHQ9
SUM OF ALL RESPONSES TO PHQ QUESTIONS 1-9: 0
2. FEELING DOWN, DEPRESSED OR HOPELESS: 0
SUM OF ALL RESPONSES TO PHQ QUESTIONS 1-9: 0
SUM OF ALL RESPONSES TO PHQ9 QUESTIONS 1 & 2: 0
SUM OF ALL RESPONSES TO PHQ QUESTIONS 1-9: 0
1. LITTLE INTEREST OR PLEASURE IN DOING THINGS: 0
SUM OF ALL RESPONSES TO PHQ QUESTIONS 1-9: 0

## 2022-04-12 ASSESSMENT — SOCIAL DETERMINANTS OF HEALTH (SDOH): HOW HARD IS IT FOR YOU TO PAY FOR THE VERY BASICS LIKE FOOD, HOUSING, MEDICAL CARE, AND HEATING?: NOT HARD AT ALL

## 2022-04-12 NOTE — PATIENT INSTRUCTIONS
DM:   NO sugar, decrease fruit intake, No juice, NO veggies with color other than green, NO sauteed onions or anything that caramelizes, you may eat raw onions. NO alcohol, try not to eat diabetic candies as they may cause diarrhea. Minimize your carbohydrate intake. Directions to check sugar levels: One touch glucometer strips, lancets and alcohol pads, check your fasting sugar when you wake up, 2 hrs after lunch then 2 hrs after dinner and write it down on a piece of paper. NO sugar whatsoever. Keep a food diary. Write down everything you eat and drink every day      Elevated Blood Pressure:   No caffeine   No fast foods   Decrease salt in diet (consume nothing in a can, nothing in a box as these things contain high sodium)   No energy drinks   Buy a BP cuff and take blood pressure 3 times a day and write down blood pressure and pulse and bring in to me when you RTO   Lose weight and increase exercise   Start only walking then may advance to brisk walking and lift low poundage free weights    Elevated Cholesterol:   No greasy, fried, fast, fatty foods   No saturated fats   Decreased red meat intake to once every 2 months   No butter, carballo nor cream cheese or cheese   No egg yolk   NO milk   Decrease your cholesterol in diet    Diabetic foot exam today  Stop drinking red bull bad for your blood pressure. ..he did not know that he says  No fast food I read the above dietary restrictions to him    Refilled medications  Pt does not remember what his glucose was today  Pt did not remember to take his lantus today    Pt is non compliant with his medications and his diet    Abdominal xray ordered.  He felt his abdomen and says the pain is gone    Fasting blood work and follow up within a month

## 2022-04-12 NOTE — PROGRESS NOTES
Name: Crow Giordano  DOS: 4/12/2022  MRN: [id number]      Subjective:  Crow Giordano is a 40 y.o. male being seen for   Chief Complaint   Patient presents with    Hypertension     Diabetes- Last HGB A1C was 2/11/22= 9.2, Asthma, Hypertriglyceridemia, Last labs 2/11/2022. Blanchie Bevels Ringworm under right arm. Get established. Vitals:    04/12/22 1614   BP:    Pulse: 97   Resp: 16   Temp:    SpO2: 96%     Allergies   Allergen Reactions    Amoxicillin     Other      Moral mushrooms    Pcn [Penicillins]      Thinks pcn      Past Medical History:   Diagnosis Date    Bipolar 1 disorder ([facility]     History of heroin abuse ([facility]     Hypertension     [facility]      Past Surgical History:   Procedure Laterality Date    FINGER SURGERY Left     index finger    KNEE SURGERY Left     had metal removed     Social History     Socioeconomic History    Marital status: Single     Spouse name: Not on file    Number of children: Not on file    Years of education: Not on file    Highest education level: Not on file   Occupational History    Not on file   Tobacco Use    Smoking status: Current Every Day Smoker     Types: Cigarettes    Smokeless tobacco: Current User    Tobacco comment: one pack a day   Vaping Use    Vaping Use: Never used   Substance and Sexual Activity    Alcohol use: Not Currently    Drug use: Not on file     Comment: 127 days sober    Sexual activity: Not on file   Other Topics Concern    Not on file   Social History Narrative    Not on file     Social Determinants of Health     Financial Resource Strain: Low Risk     Difficulty of Paying Living Expenses: Not hard at all   Food Insecurity: No Food Insecurity    Worried About Running Out of Food in the Last Year: Never true    Zeke of Food in the Last Year: Never true   Transportation Needs:     Lack of Transportation (Medical): Not on file    Lack of Transportation (Non-Medical):  Not on file   Physical Activity:  Days of Exercise per Week: Not on file    Minutes of Exercise per Session: Not on file   Stress:     Feeling of Stress : Not on file   Social Connections:     Frequency of Communication with Friends and Family: Not on file    Frequency of Social Gatherings with Friends and Family: Not on file    Attends Anabaptist Services: Not on file    Active Member of [address] [place] [address] or Organizations: Not on file    Attends Club or Organization Meetings: Not on file    Marital Status: Not on file   Intimate Partner Violence:     Fear of Current or Ex-Partner: Not on file    Emotionally Abused: Not on file    Physically Abused: Not on file    Sexually Abused: Not on file   Housing Stability:     Unable to Pay for Housing in the Last Year: Not on file    Number of [place] in the Last Year: Not on file    Unstable Housing in the Last Year: Not on file       Current Outpatient Medications   Medication Sig Dispense Refill    Alcohol Swabs (ALCOHOL PREP) 70 % PADS TEST THREE TIMES A  each 3    albuterol sulfate  (90 Base) MCG/ACT inhaler Inhale 2 puffs into the lungs every 4 hours as needed for Wheezing or Shortness of Breath (cough) 3 each 3    hydroCHLOROthiazide (HYDRODIURIL) 25 MG tablet Take 1 tablet by mouth every morning 30 tablet 2    Dulaglutide (TRULICITY) 1.5 SY/6.1LQ SOPN INJECT 1.5MG SUBCUTANEOUSLY ONCE EVERY WEEK 2 mL 3    losartan (COZAAR) 100 MG tablet Take 1 tablet by mouth daily 30 tablet 3    budesonide-formoterol (SYMBICORT) 160-4.5 MCG/ACT AERO INHALE 2 PUFFS BY MOUTH TWICE A DAY RINSE MOUTH AFTER USE 10.2 g 3    metoprolol succinate (TOPROL XL) 50 MG extended release tablet Take 1 tablet by mouth daily 90 tablet 1    metFORMIN (GLUCOPHAGE) 1000 MG tablet TAKE 1 TABLET BY MOUTH TWICE A DAY WITH MEALS 60 tablet 3    atorvastatin (LIPITOR) 20 MG tablet Take 1 tablet by mouth daily 30 tablet 3    ketoconazole (NIZORAL) 2 % cream Apply topically daily three times day 30 g 1    LANTUS SOLOSTAR 100 UNIT/ML injection pen INJECT 25 UNITS SUBCUTANEOUSLY TWICE A DAY 15 mL 3    Cholecalciferol (VITAMIN D3) 50 MCG (2000 UT) TABS TAKE 1 TABLET BY MOUTH DAILY 30 tablet 1    ezetimibe (ZETIA) 10 MG tablet TAKE 1 TABLET BY MOUTH DAILY 30 tablet 3    naproxen (NAPROSYN) 500 MG tablet TAKE 1 TABLET BY MOUTH TWICE A DAY AS NEEDED FOR PAIN 60 tablet 0    potassium chloride (KLOR-CON M) 20 MEQ extended release tablet TAKE 1 TABLET BY MOUTH DAILY 30 tablet 1    Insulin Pen Needle ([employer] SHORT PEN NEEDLES) 31G X 8 MM MISC USE WITH LANTUS TWICE A  each 3    Blood Pressure Monitoring (B-D ASSURE BPM/MANUAL ARM CUFF) MISC 1 applicator by Does not apply route 2 times daily Check BP twice daily 1 each 0    hydrOXYzine (ATARAX) 25 MG tablet       blood glucose monitor kit and supplies Dispense all needed supplies to include: monitor, strips, lancing device, lancets, control solutions, alcohol swabs. 1 kit 0    Lancets MISC 1 each by Does not apply route 3 times daily 300 each 3    blood glucose monitor strips Test 3 times a day & as needed for symptoms of irregular blood glucose. Dispense sufficient amount for indicated testing frequency. 300 strip 3    OXcarbazepine (TRILEPTAL) 300 MG tablet Take 1 tablet by mouth daily      citalopram (CELEXA) 10 MG tablet  (Patient not taking: Reported on 4/12/2022)       No current facility-administered medications for this visit. Objective:  Pt feels good. Still has tinea on right inside arm. Pt did not take his lantus this am. Pt tries not to forget taking his meds but he does forget to take it. Pt had 2 cheeseburgers with ketchup and redbull. Last night for dinner dumplings frozen food. Pt admits to not following a diabetic diet. I reviewed all the medical problems he has. He is living at a renewed mind for drug addiction. He now has a job. Review of Systems   Constitutional: Positive for fatigue (sometimes as he works third shift).  Negative for unexpected weight change. Eyes: Negative for photophobia and visual disturbance. Respiratory: Positive for wheezing (asthma and he is a smoker). Negative for cough, chest tightness and shortness of breath. Cardiovascular: Negative for chest pain, palpitations and leg swelling. Gastrointestinal: Negative for abdominal pain. Genitourinary: Negative for difficulty urinating and hematuria. Musculoskeletal: Positive for arthralgias (right knee and spine lower and upper arthritis and \"messed up discs\"). Negative for myalgias. Skin: Negative for rash and wound. Neurological: Positive for headaches (occasionally). Negative for dizziness, tremors, seizures, syncope, speech difficulty, weakness, light-headedness and numbness. Hematological: Negative for adenopathy. Does not bruise/bleed easily. Psychiatric/Behavioral: Negative for agitation, confusion, hallucinations and suicidal ideas. The patient is not hyperactive. Physical Exam  Constitutional:       General: He is not in acute distress. Appearance: Normal appearance. He is obese. He is not ill-appearing, toxic-appearing or diaphoretic. HENT:      Head: Normocephalic and atraumatic. Right Ear: Tympanic membrane, ear canal and external ear normal.      Left Ear: Tympanic membrane, ear canal and external ear normal.      Nose: Nose normal.      Mouth/Throat:      Mouth: Mucous membranes are moist.      Pharynx: Oropharynx is clear. Eyes:      Extraocular Movements: Extraocular movements intact. Conjunctiva/sclera: Conjunctivae normal.      Pupils: Pupils are equal, round, and reactive to light. Cardiovascular:      Rate and Rhythm: Normal rate and regular rhythm. Pulses: Normal pulses. Heart sounds: Normal heart sounds. No murmur heard. Pulmonary:      Effort: Pulmonary effort is normal.      Breath sounds: Normal breath sounds. No wheezing, rhonchi or rales. Abdominal:      General: Abdomen is flat.  Bowel sounds are normal. There is no distension. Palpations: Abdomen is soft. There is no mass. Tenderness: There is abdominal tenderness (LLQ abdominal tenderness upon palpation). There is no guarding. Musculoskeletal:         General: Normal range of motion. Cervical back: Normal range of motion and neck supple. Right lower leg: No edema. Left lower leg: No edema. Comments: No wounds on feet   Lymphadenopathy:      Cervical: No cervical adenopathy. Skin:     General: Skin is warm. Capillary Refill: Capillary refill takes less than 2 seconds. Findings: Rash (right arm irregular red flat rash, medially) present. No lesion. Neurological:      General: No focal deficit present. Mental Status: He is alert and oriented to person, place, and time. Gait: Gait normal.   Psychiatric:         Mood and Affect: Mood normal.         Behavior: Behavior normal.         Thought Content: Thought content normal.      Visual inspection:  Deformity/amputation: absent  Skin lesions/pre-ulcerative calluses: absent  Edema: right- negative, left- negative    Sensory exam:  Monofilament sensation: normal  (minimum of 5 random plantar locations tested, avoiding callused areas - > 1 area with absence of sensation is + for neuropathy)    Plus at least one of the following:  Pulses: normal,   Pinprick: Intact  Proprioception: Intact  Vibration (128 Hz): Intact      Assessment:   Diagnosis Orders   1. Essential hypertension  CBC with Auto Differential    Comprehensive Metabolic Panel    TSH with Reflex    Microalbumin, Ur    hydroCHLOROthiazide (HYDRODIURIL) 25 MG tablet    losartan (COZAAR) 100 MG tablet    metoprolol succinate (TOPROL XL) 50 MG extended release tablet   2. Vitamin D deficiency  Vitamin D 25 Hydroxy   3. Hypokalemia  Comprehensive Metabolic Panel   4.  Type 2 diabetes mellitus with microalbuminuria, with long-term current use of insulin (HCC)  Comprehensive Metabolic Panel Microalbumin, Ur    Ambulatory referral to Optometry     DIABETES FOOT EXAM    hydroCHLOROthiazide (HYDRODIURIL) 25 MG tablet    Dulaglutide (TRULICITY) 1.5 QS/9.5AS SOPN    losartan (COZAAR) 100 MG tablet    metFORMIN (GLUCOPHAGE) 1000 MG tablet    POCT glycosylated hemoglobin (Hb A1C)    Catrachito Farr NP, Diabetic Education, [place]   5. Hypertriglyceridemia  Lipid Panel    atorvastatin (LIPITOR) 20 MG tablet   6. Proteinuria, unspecified type  Urinalysis with Reflex to Culture   7. Prostate cancer screening  PSA Screening   8. Wellness examination  CBC with Auto Differential    Comprehensive Metabolic Panel    Lipid Panel    TSH with Reflex    PSA Screening    Microalbumin, Ur    Hepatitis C Antibody    HIV Screen    Vitamin D 25 Hydroxy    Urinalysis with Reflex to Culture   9. Encounter for hepatitis C screening test for low risk patient  Hepatitis C Antibody   10. Encounter for screening for HIV  HIV Screen   11. Moderate persistent asthma, unspecified whether complicated  albuterol sulfate  (90 Base) MCG/ACT inhaler   12. Other chronic pain     13. Chronic bilateral low back pain with right-sided sciatica     14. Smoker     15. Tinea pedis of left foot     16. Ingrowing nail, right great toe     17. Ringworm  ketoconazole (NIZORAL) 2 % cream   18. Opioid dependence in remission ([facility]     19. Cocaine abuse ([facility]     20. Mixed hyperlipidemia  atorvastatin (LIPITOR) 20 MG tablet   21. Need for Tdap vaccination  Tdap (age 6y and older) IM ([address])   25. Mild persistent asthma without complication  albuterol sulfate  (90 Base) MCG/ACT inhaler   23.  LLQ abdominal pain  XR ABDOMEN (2 VIEWS)         Plan:  Orders Placed This Encounter   Procedures    XR ABDOMEN (2 VIEWS)     Standing Status:   Future     Standing Expiration Date:   5/12/2022     Order Specific Question:   Reason for exam:     Answer:   LLQ abdominal pain    Tdap (age 6y and older) IM ([address])    CBC with Auto Differential     Standing Status:   Future     Standing Expiration Date:   5/5/2022    Comprehensive Metabolic Panel     Standing Status:   Future     Standing Expiration Date:   5/5/2022    Lipid Panel     Standing Status:   Future     Standing Expiration Date:   5/5/2022     Order Specific Question:   Is Patient Fasting?/# of Hours     Answer: Yes    TSH with Reflex     Standing Status:   Future     Standing Expiration Date:   5/5/2022    PSA Screening     Standing Status:   Future     Standing Expiration Date:   5/5/2022    Microalbumin, Ur     Standing Status:   Future     Standing Expiration Date:   8/12/2022    Hepatitis C Antibody     Standing Status:   Future     Standing Expiration Date:   8/12/2022    HIV Screen     Standing Status:   Future     Standing Expiration Date:   8/12/2022    Vitamin D 25 Hydroxy     Standing Status:   Future     Standing Expiration Date:   8/12/2022    Urinalysis with Reflex to Culture     Standing Status:   Future     Standing Expiration Date:   8/12/2022     Order Specific Question:   SPECIFY(EX-CATH,MIDSTREAM,CYSTO,ETC)? Answer:   midstream    Ambulatory referral to Optometry     Referral Priority:   Routine     Referral Type:   Eval and Treat     Referral Reason:   Specialty Services Required     Requested Specialty:   Optometry     Number of Visits Requested:   [address] Shaikh NP, Diabetic Education, [place]     Referral Priority:   Routine     Referral Type:   Eval and Treat     Referral Reason:   Specialty Services Required     Referred to Provider:   DAWIT Bhakta CNP     Requested Specialty:   Nurse Practitioner     Number of Visits Requested:   1    POCT glycosylated hemoglobin (Hb A1C)    HM DIABETES FOOT EXAM         Patient Instructions   DM:   NO sugar, decrease fruit intake, No juice, NO veggies with color other than green, NO sauteed onions or anything that caramelizes, you may eat raw onions.  NO alcohol, try not to eat diabetic candies as they may cause diarrhea. Minimize your carbohydrate intake. Directions to check sugar levels: One touch glucometer strips, lancets and alcohol pads, check your fasting sugar when you wake up, 2 hrs after lunch then 2 hrs after dinner and write it down on a piece of paper. NO sugar whatsoever. Keep a food diary. Write down everything you eat and drink every day      Elevated Blood Pressure:   No caffeine   No fast foods   Decrease salt in diet (consume nothing in a can, nothing in a box as these things contain high sodium)   No energy drinks   Buy a BP cuff and take blood pressure 3 times a day and write down blood pressure and pulse and bring in to me when you RTO   Lose weight and increase exercise   Start only walking then may advance to brisk walking and lift low poundage free weights    Elevated Cholesterol:   No greasy, fried, fast, fatty foods   No saturated fats   Decreased red meat intake to once every 2 months   No butter, carballo nor cream cheese or cheese   No egg yolk   NO milk   Decrease your cholesterol in diet    Diabetic foot exam today  Stop drinking red bull bad for your blood pressure. ..he did not know that he says  No fast food I read the above dietary restrictions to him    Refilled medications  Pt does not remember what his glucose was today  Pt did not remember to take his lantus today    Pt is non compliant with his medications and his diet    Abdominal xray ordered. He felt his abdomen and says the pain is gone    Fasting blood work and follow up within a month       Return in about 1 month (around 5/12/2022) for DM,HTN,CHOL.      Andrei Gilbert DO

## 2022-05-16 ENCOUNTER — OFFICE VISIT (OUTPATIENT)
Dept: FAMILY MEDICINE CLINIC | Age: 47
End: 2022-05-16
Payer: COMMERCIAL

## 2022-05-16 ENCOUNTER — NURSE ONLY (OUTPATIENT)
Dept: FAMILY MEDICINE CLINIC | Age: 47
End: 2022-05-16
Payer: COMMERCIAL

## 2022-05-16 VITALS
RESPIRATION RATE: 16 BRPM | HEART RATE: 81 BPM | SYSTOLIC BLOOD PRESSURE: 136 MMHG | TEMPERATURE: 97.1 F | OXYGEN SATURATION: 98 % | HEIGHT: 70 IN | WEIGHT: 300.4 LBS | DIASTOLIC BLOOD PRESSURE: 80 MMHG | BODY MASS INDEX: 43.01 KG/M2

## 2022-05-16 DIAGNOSIS — E11.29 TYPE 2 DIABETES MELLITUS WITH MICROALBUMINURIA, WITH LONG-TERM CURRENT USE OF INSULIN (HCC): ICD-10-CM

## 2022-05-16 DIAGNOSIS — R80.9 TYPE 2 DIABETES MELLITUS WITH MICROALBUMINURIA, WITH LONG-TERM CURRENT USE OF INSULIN (HCC): ICD-10-CM

## 2022-05-16 DIAGNOSIS — K59.00 CONSTIPATION, UNSPECIFIED CONSTIPATION TYPE: ICD-10-CM

## 2022-05-16 DIAGNOSIS — J20.9 ACUTE BRONCHITIS, UNSPECIFIED ORGANISM: Primary | ICD-10-CM

## 2022-05-16 DIAGNOSIS — E78.1 HYPERTRIGLYCERIDEMIA: ICD-10-CM

## 2022-05-16 DIAGNOSIS — D72.829 LEUKOCYTOSIS, UNSPECIFIED TYPE: ICD-10-CM

## 2022-05-16 DIAGNOSIS — I10 ESSENTIAL HYPERTENSION: ICD-10-CM

## 2022-05-16 DIAGNOSIS — Z79.4 TYPE 2 DIABETES MELLITUS WITH MICROALBUMINURIA, WITH LONG-TERM CURRENT USE OF INSULIN (HCC): ICD-10-CM

## 2022-05-16 DIAGNOSIS — D72.829 LEUKOCYTOSIS, UNSPECIFIED TYPE: Primary | ICD-10-CM

## 2022-05-16 LAB
ALBUMIN/GLOBULIN RATIO: 1.3 G/DL
ALBUMIN: 4.4 G/DL (ref 3.5–5)
ALP BLD-CCNC: 82 UNITS/L (ref 38–126)
ALT SERPL-CCNC: 48 UNITS/L (ref 4–50)
ANION GAP SERPL CALCULATED.3IONS-SCNC: 9.2 MMOL/L
AST SERPL-CCNC: 46 UNITS/L (ref 17–59)
BACTERIA, URINE: ABNORMAL
BASOPHILS %: 0.87 (ref 0–3)
BASOPHILS ABSOLUTE: 0.11 (ref 0–0.3)
BILIRUB SERPL-MCNC: 0.5 MG/DL (ref 0.2–1.3)
BILIRUBIN URINE: NEGATIVE
BLOOD, URINE: NEGATIVE
BUN BLDV-MCNC: 11 MG/DL (ref 9–20)
CALCIUM SERPL-MCNC: 9.7 MG/DL (ref 8.4–10.2)
CASTS UA: ABNORMAL
CHLORIDE BLD-SCNC: 99 MMOL/L (ref 98–120)
CHOLESTEROL/HDL RATIO: 3.23 RATIO (ref 0–4.5)
CHOLESTEROL: 113 MG/DL (ref 50–200)
CLARITY: CLEAR
CO2: 31 MMOL/L (ref 22–31)
COLOR, URINE: YELLOW
CREAT SERPL-MCNC: 0.7 MG/DL (ref 0.7–1.3)
CREATININE, RANDOM URINE: 147.5 MG/DL (ref 20–370)
CRYSTALS, UA: ABNORMAL
DIAGNOSTIC PSA: 1.64 NG/ML (ref 0–4)
EOSINOPHILS %: 1.56 (ref 0–10)
EOSINOPHILS ABSOLUTE: 0.19 (ref 0–1.1)
GFR CALCULATED: > 60
GLOBULIN: 3.5 G/DL
GLUCOSE URINE: NEGATIVE MG/DL
GLUCOSE: 147 MG/DL (ref 75–110)
HCT VFR BLD CALC: 48.8 % (ref 42–52)
HDLC SERPL-MCNC: 35 MG/DL (ref 36–68)
HEMOGLOBIN: 15.7 (ref 13.8–17.8)
HEPATITIS C ANTIBODY: NONREACTIVE
HIV AG/AB: NONREACTIVE
KETONES, URINE: NEGATIVE MG/DL
LDL CHOLESTEROL CALCULATED: 58.6 MG/DL (ref 0–160)
LEUKOCYTE ESTERASE, URINE: NEGATIVE
LYMPHOCYTE %: 38.14 (ref 20–51.1)
LYMPHOCYTES ABSOLUTE: 4.76 (ref 1–5.5)
Lab: NORMAL
MCH RBC QN AUTO: 29.3 PG (ref 28.5–32.5)
MCHC RBC AUTO-ENTMCNC: 32.1 G/DL (ref 32–37)
MCV RBC AUTO: 91.3 FL (ref 80–94)
MICROALBUMIN UR-MCNC: 76.9 MG/DL (ref 0–1.7)
MICROALBUMIN/CREAT UR-RTO: 521.35
MONOCYTES %: 5.17 (ref 1.7–9.3)
MONOCYTES ABSOLUTE: 0.65 (ref 0.1–1)
MUCUS, URINE: ABNORMAL
NEUTROPHILS %: 54.26 (ref 42.2–75.2)
NEUTROPHILS ABSOLUTE: 6.78 (ref 2–8.1)
NITRITE, URINE: NEGATIVE
PDW BLD-RTO: 13.4 % (ref 10–15.5)
PH UA: 6 (ref 5–8.5)
PLATELET # BLD: 237.8 THOU/MM3 (ref 130–400)
POTASSIUM SERPL-SCNC: 3.9 MMOL/L (ref 3.6–5)
PROTEIN UA: ABNORMAL MG/DL
QC PASS/FAIL: NORMAL
RBC URINE: ABNORMAL (ref 0–2)
RBC: 5.35 M/UL (ref 4.7–6.1)
SARS-COV-2 RDRP RESP QL NAA+PROBE: NEGATIVE
SODIUM BLD-SCNC: 139 MMOL/L (ref 135–145)
SPECIFIC GRAVITY, URINE: 1.03 MG/DL (ref 1–1.03)
SQUAMOUS EPITHELIAL: ABNORMAL
TOTAL PROTEIN, SERUM: 7.9 G/DL (ref 6.3–8.2)
TRICHOMONAS, URINE: ABNORMAL
TRIGL SERPL-MCNC: 97 MG/DL (ref 10–250)
TSH REFLEX FT4: 3.29 MIU/ML (ref 0.49–4.67)
UROBILINOGEN, URINE: 0.2 MG/DL (ref 0.2–1)
VITAMIN D 25-HYDROXY: 36.9 NG/ML (ref 30–100)
VLDLC SERPL CALC-MCNC: 19 MG/DL (ref 0–50)
WBC URINE: ABNORMAL (ref 0–4)
WBC: 12.5 THOU/ML3 (ref 4.8–10.8)
YEAST, URINE: ABNORMAL

## 2022-05-16 PROCEDURE — 3046F HEMOGLOBIN A1C LEVEL >9.0%: CPT | Performed by: FAMILY MEDICINE

## 2022-05-16 PROCEDURE — G8427 DOCREV CUR MEDS BY ELIG CLIN: HCPCS | Performed by: FAMILY MEDICINE

## 2022-05-16 PROCEDURE — 99214 OFFICE O/P EST MOD 30 MIN: CPT | Performed by: FAMILY MEDICINE

## 2022-05-16 PROCEDURE — 99211 OFF/OP EST MAY X REQ PHY/QHP: CPT | Performed by: NURSE PRACTITIONER

## 2022-05-16 PROCEDURE — 99212 OFFICE O/P EST SF 10 MIN: CPT | Performed by: FAMILY MEDICINE

## 2022-05-16 PROCEDURE — PBSHW PBB SHADOW CHARGE: Performed by: NURSE PRACTITIONER

## 2022-05-16 PROCEDURE — G8417 CALC BMI ABV UP PARAM F/U: HCPCS | Performed by: FAMILY MEDICINE

## 2022-05-16 PROCEDURE — 87635 SARS-COV-2 COVID-19 AMP PRB: CPT | Performed by: NURSE PRACTITIONER

## 2022-05-16 PROCEDURE — 4004F PT TOBACCO SCREEN RCVD TLK: CPT | Performed by: FAMILY MEDICINE

## 2022-05-16 PROCEDURE — 99058 OFFICE EMERGENCY CARE: CPT | Performed by: FAMILY MEDICINE

## 2022-05-16 PROCEDURE — PBSHW POCT COVID-19 RAPID, NAAT: Performed by: NURSE PRACTITIONER

## 2022-05-16 PROCEDURE — 2022F DILAT RTA XM EVC RTNOPTHY: CPT | Performed by: FAMILY MEDICINE

## 2022-05-16 RX ORDER — BENZONATATE 200 MG/1
200 CAPSULE ORAL 3 TIMES DAILY PRN
Qty: 15 CAPSULE | Refills: 0 | Status: SHIPPED | OUTPATIENT
Start: 2022-05-16 | End: 2022-05-21

## 2022-05-16 RX ORDER — DOXYCYCLINE HYCLATE 100 MG
100 TABLET ORAL 2 TIMES DAILY
Qty: 14 TABLET | Refills: 0 | Status: SHIPPED | OUTPATIENT
Start: 2022-05-16 | End: 2022-05-23

## 2022-05-16 ASSESSMENT — ENCOUNTER SYMPTOMS
WHEEZING: 1
SHORTNESS OF BREATH: 0
PHOTOPHOBIA: 0
ABDOMINAL PAIN: 0
CHEST TIGHTNESS: 0
COUGH: 1
CHOKING: 0

## 2022-05-16 ASSESSMENT — PATIENT HEALTH QUESTIONNAIRE - PHQ9
2. FEELING DOWN, DEPRESSED OR HOPELESS: 0
SUM OF ALL RESPONSES TO PHQ QUESTIONS 1-9: 0
SUM OF ALL RESPONSES TO PHQ9 QUESTIONS 1 & 2: 0
1. LITTLE INTEREST OR PLEASURE IN DOING THINGS: 0

## 2022-05-16 NOTE — PROGRESS NOTES
Name: Tonny Monsalve  DOS: 5/16/2022  MRN: [id number]      Subjective:  Tonny Monsalve is a 40 y.o. male being seen for   Chief Complaint   Patient presents with    Diabetes     lab results        Vitals:    05/16/22 1133   BP:    Pulse: 81   Resp: 16   Temp:    SpO2: 98%     Allergies   Allergen Reactions    Amoxicillin     Other      Moral mushrooms    Pcn [Penicillins]      Thinks pcn      Past Medical History:   Diagnosis Date    Bipolar 1 disorder ([facility]     History of heroin abuse ([facility]     Hypertension     Personality disorder ([place] [address].)      Past Surgical History:   Procedure Laterality Date    FINGER SURGERY Left     index finger    KNEE SURGERY Left     had metal removed     Social History     Socioeconomic History    Marital status: Single     Spouse name: None    Number of children: None    Years of education: None    Highest education level: None   Occupational History    None   Tobacco Use    Smoking status: Current Every Day Smoker     Types: Cigarettes    Smokeless tobacco: Current User    Tobacco comment: one pack a day   Vaping Use    Vaping Use: Never used   Substance and Sexual Activity    Alcohol use: Not Currently    Drug use: None     Comment: 127 days sober    Sexual activity: None   Other Topics Concern    None   Social History Narrative    None     Social Determinants of Health     Financial Resource Strain: Low Risk     Difficulty of Paying Living Expenses: Not hard at all   Food Insecurity: No Food Insecurity    Worried About Running Out of Food in the Last Year: Never true    Zeke of Food in the Last Year: Never true   Transportation Needs:     Lack of Transportation (Medical): Not on file    Lack of Transportation (Non-Medical):  Not on file   Physical Activity:     Days of Exercise per Week: Not on file    Minutes of Exercise per Session: Not on file   Stress:     Feeling of Stress : Not on file   Social Connections:     Frequency of Communication with Friends and Family: Not on file    Frequency of Social Gatherings with Friends and Family: Not on file    Attends Alevism Services: Not on file    Active Member of Clubs or Organizations: Not on file    Attends Club or Organization Meetings: Not on file    Marital Status: Not on file   Intimate Partner Violence:     Fear of Current or Ex-Partner: Not on file    Emotionally Abused: Not on file    Physically Abused: Not on file    Sexually Abused: Not on file   Housing Stability:     Unable to Pay for Housing in the Last Year: Not on file    Number of [place] in the Last Year: Not on file    Unstable Housing in the Last Year: Not on file       Current Outpatient Medications   Medication Sig Dispense Refill    doxycycline hyclate (VIBRA-TABS) 100 MG tablet Take 1 tablet by mouth 2 times daily for 7 days 14 tablet 0    benzonatate (TESSALON) 200 MG capsule Take 1 capsule by mouth 3 times daily as needed for Cough 15 capsule 0    Alcohol Swabs (ALCOHOL PREP) 70 % PADS TEST THREE TIMES A  each 3    albuterol sulfate  (90 Base) MCG/ACT inhaler Inhale 2 puffs into the lungs every 4 hours as needed for Wheezing or Shortness of Breath (cough) 3 each 3    hydroCHLOROthiazide (HYDRODIURIL) 25 MG tablet Take 1 tablet by mouth every morning 30 tablet 2    Dulaglutide (TRULICITY) 1.5 TK/9.0LA SOPN INJECT 1.5MG SUBCUTANEOUSLY ONCE EVERY WEEK 2 mL 3    losartan (COZAAR) 100 MG tablet Take 1 tablet by mouth daily 30 tablet 3    budesonide-formoterol (SYMBICORT) 160-4.5 MCG/ACT AERO INHALE 2 PUFFS BY MOUTH TWICE A DAY RINSE MOUTH AFTER USE 10.2 g 3    metoprolol succinate (TOPROL XL) 50 MG extended release tablet Take 1 tablet by mouth daily 90 tablet 1    metFORMIN (GLUCOPHAGE) 1000 MG tablet TAKE 1 TABLET BY MOUTH TWICE A DAY WITH MEALS 60 tablet 3    atorvastatin (LIPITOR) 20 MG tablet Take 1 tablet by mouth daily 30 tablet 3    LANTUS SOLOSTAR 100 UNIT/ML injection pen INJECT 25 UNITS SUBCUTANEOUSLY TWICE A DAY 15 mL 3    Cholecalciferol (VITAMIN D3) 50 MCG (2000 UT) TABS TAKE 1 TABLET BY MOUTH DAILY 30 tablet 1    naproxen (NAPROSYN) 500 MG tablet TAKE 1 TABLET BY MOUTH TWICE A DAY AS NEEDED FOR PAIN 60 tablet 0    potassium chloride (KLOR-CON M) 20 MEQ extended release tablet TAKE 1 TABLET BY MOUTH DAILY 30 tablet 1    citalopram (CELEXA) 10 MG tablet       Insulin Pen Needle (ULTICARE SHORT PEN NEEDLES) 31G X 8 MM MISC USE WITH LANTUS TWICE A  each 3    hydrOXYzine (ATARAX) 25 MG tablet       blood glucose monitor kit and supplies Dispense all needed supplies to include: monitor, strips, lancing device, lancets, control solutions, alcohol swabs. 1 kit 0    Lancets MISC 1 each by Does not apply route 3 times daily 300 each 3    blood glucose monitor strips Test 3 times a day & as needed for symptoms of irregular blood glucose. Dispense sufficient amount for indicated testing frequency. 300 strip 3    OXcarbazepine (TRILEPTAL) 300 MG tablet Take 1 tablet by mouth daily      ketoconazole (NIZORAL) 2 % cream Apply topically daily three times day 30 g 1    ezetimibe (ZETIA) 10 MG tablet TAKE 1 TABLET BY MOUTH DAILY 30 tablet 3    Blood Pressure Monitoring (B-D ASSURE BPM/MANUAL ARM CUFF) MISC 1 applicator by Does not apply route 2 times daily Check BP twice daily 1 each 0     No current facility-administered medications for this visit. Objective:  I spoke with pt this morning because he did BW and his white count came back elevated 12.5. I asked him if he feels sick he says he has had a productive with yellow and green sputum for about 2 weeks and did not mention it to me when he saw me. He does have fatigue. Having some coughing spells. NO sore throat no sinus pressure and pain. Review of Systems   Constitutional: Positive for fatigue (2 weeks). Negative for unexpected weight change. Eyes: Negative for photophobia and visual disturbance.    Respiratory: Positive for cough (productive ocugh with yellow and green sputum, having coughing spells) and wheezing (some wheezing but when he coughs the wheeze goes away). Negative for choking, chest tightness and shortness of breath. Cardiovascular: Negative for chest pain, palpitations and leg swelling. Gastrointestinal: Negative for abdominal pain. Genitourinary: Negative for difficulty urinating and hematuria. Musculoskeletal: Negative for arthralgias and myalgias. Skin: Negative for rash and wound. Neurological: Positive for headaches (having more HA's). Negative for dizziness, tremors, seizures, syncope, speech difficulty, weakness, light-headedness and numbness. Hematological: Negative for adenopathy. Does not bruise/bleed easily. Psychiatric/Behavioral: Negative for agitation, confusion, decreased concentration and suicidal ideas. Physical Exam  Constitutional:       General: He is not in acute distress. Appearance: Normal appearance. He is obese. He is not ill-appearing or toxic-appearing. HENT:      Head: Normocephalic and atraumatic. Right Ear: Tympanic membrane, ear canal and external ear normal. There is no impacted cerumen. Left Ear: Tympanic membrane, ear canal and external ear normal. There is no impacted cerumen. Nose: Nose normal. No congestion or rhinorrhea. Mouth/Throat:      Mouth: Mucous membranes are moist.      Pharynx: Oropharynx is clear. Eyes:      Extraocular Movements: Extraocular movements intact. Conjunctiva/sclera: Conjunctivae normal.      Pupils: Pupils are equal, round, and reactive to light. Cardiovascular:      Rate and Rhythm: Normal rate and regular rhythm. Pulses: Normal pulses. Heart sounds: Normal heart sounds. No murmur heard. Pulmonary:      Effort: Pulmonary effort is normal. No respiratory distress. Breath sounds: No stridor. Rhonchi (anterior chest wall rhonchi when asked pt to cough) present.  No wheezing or rales. Abdominal:      General: Abdomen is flat. Bowel sounds are normal. There is no distension. Palpations: Abdomen is soft. There is no mass. Tenderness: There is no abdominal tenderness. There is no left CVA tenderness or guarding. Musculoskeletal:         General: Normal range of motion. Cervical back: Normal range of motion and neck supple. Right lower leg: No edema. Left lower leg: No edema. Lymphadenopathy:      Cervical: No cervical adenopathy. Skin:     General: Skin is warm. Capillary Refill: Capillary refill takes less than 2 seconds. Neurological:      General: No focal deficit present. Mental Status: He is alert and oriented to person, place, and time. Motor: No weakness. Coordination: Coordination normal.      Gait: Gait normal.   Psychiatric:         Mood and Affect: Mood normal.         Behavior: Behavior normal.         Thought Content: Thought content normal.          Assessment:   Diagnosis Orders   1. Acute bronchitis, unspecified organism  XR CHEST STANDARD (2 VW)    CBC with Auto Differential    doxycycline hyclate (VIBRA-TABS) 100 MG tablet    benzonatate (TESSALON) 200 MG capsule   2. Leukocytosis, unspecified type  CBC with Auto Differential    doxycycline hyclate (VIBRA-TABS) 100 MG tablet   3. Constipation, unspecified constipation type     4. Type 2 diabetes mellitus with microalbuminuria, with long-term current use of insulin ([facility])     5. Essential hypertension     6.  Hypertriglyceridemia           Plan:  Orders Placed This Encounter   Procedures    XR CHEST STANDARD (2 VW)     Standing Status:   Future     Standing Expiration Date:   6/16/2022     Order Specific Question:   Reason for exam:     Answer:   Acute bronchitis, unspecified organism    XR ABDOMEN (2 VIEWS)    CBC with Auto Differential     Standing Status:   Future     Standing Expiration Date:   6/16/2022         Patient Instructions   DM:   NO sugar, decrease fruit intake, No juice, NO veggies with color other than green, NO sauteed onions or anything that caramelizes, you may eat raw onions. NO alcohol, try not to eat diabetic candies as they may cause diarrhea. Minimize your carbohydrate intake. Elevated Cholesterol:   No greasy, fried, fast, fatty foods   No trans fats   Decreased red meat intake to once every 2 months   No butter, carballo nor cream cheese, cheese   No egg yolk   NO milk   Decrease your cholesterol in diet    Ordered a covid test negative    Reviewed labs with pt cbc,cmp,lipids,tsh,psa  Reviewed abdominal xray with pt  Get miralax over the counter as directed as you are constipated    Start doxycycline every 12 hrs for 7 days  Start tessalon pearls for coughing spells as directed   Start mucinex DM get over the counter for cough    No cheese, chocolate, OJ nor milk as these things cause congestion  Keep hydrated  If no improvement or get worse within 2-3 days RTO or PULMONOLOGIST, or ER    Repeat cbc tomorrow afternoon I will call you with the results  I will call you today after I get the chest xray results back    Return to office Thursday for follow up         No follow-ups on file.      Valdemar Ritter, DO

## 2022-05-16 NOTE — RESULT ENCOUNTER NOTE
I spoke with pt re: his elevated white count, he does have a productive cough and will come to see me today at 1:40 pm

## 2022-05-16 NOTE — PATIENT INSTRUCTIONS
DM:   NO sugar, decrease fruit intake, No juice, NO veggies with color other than green, NO sauteed onions or anything that caramelizes, you may eat raw onions. NO alcohol, try not to eat diabetic candies as they may cause diarrhea. Minimize your carbohydrate intake.     Elevated Cholesterol:   No greasy, fried, fast, fatty foods   No trans fats   Decreased red meat intake to once every 2 months   No butter, carballo nor cream cheese, cheese   No egg yolk   NO milk   Decrease your cholesterol in diet    Ordered a covid test negative    Reviewed labs with pt cbc,cmp,lipids,tsh,psa  Reviewed abdominal xray with pt  Get miralax over the counter as directed as you are constipated    Start doxycycline every 12 hrs for 7 days  Start tessalon pearls for coughing spells as directed   Start mucinex DM get over the counter for cough    No cheese, chocolate, OJ nor milk as these things cause congestion  Keep hydrated  If no improvement or get worse within 2-3 days RTO or PULMONOLOGIST, or ER    Repeat cbc tomorrow afternoon I will call you with the results  I will call you today after I get the chest xray results back    Return to office Thursday for follow up

## 2022-05-16 NOTE — RESULT ENCOUNTER NOTE
Called pt 2:12 pm today I reviewed the cxr with him, he told me he got all his medications and took them already

## 2022-05-17 ENCOUNTER — OFFICE VISIT (OUTPATIENT)
Dept: DIABETES SERVICES | Age: 47
End: 2022-05-17
Payer: COMMERCIAL

## 2022-05-17 ENCOUNTER — APPOINTMENT (OUTPATIENT)
Dept: GENERAL RADIOLOGY | Age: 47
End: 2022-05-17
Payer: COMMERCIAL

## 2022-05-17 ENCOUNTER — HOSPITAL ENCOUNTER (EMERGENCY)
Age: 47
Discharge: HOME | End: 2022-05-17
Attending: SPECIALIST
Payer: COMMERCIAL

## 2022-05-17 VITALS
WEIGHT: 302.8 LBS | RESPIRATION RATE: 17 BRPM | TEMPERATURE: 97 F | OXYGEN SATURATION: 94 % | BODY MASS INDEX: 42.39 KG/M2 | SYSTOLIC BLOOD PRESSURE: 147 MMHG | DIASTOLIC BLOOD PRESSURE: 93 MMHG | HEIGHT: 71 IN | HEART RATE: 85 BPM

## 2022-05-17 VITALS
BODY MASS INDEX: 41.72 KG/M2 | WEIGHT: 298 LBS | DIASTOLIC BLOOD PRESSURE: 88 MMHG | HEART RATE: 92 BPM | SYSTOLIC BLOOD PRESSURE: 122 MMHG | RESPIRATION RATE: 18 BRPM | HEIGHT: 71 IN

## 2022-05-17 DIAGNOSIS — J06.9 UPPER RESPIRATORY TRACT INFECTION, UNSPECIFIED TYPE: Primary | ICD-10-CM

## 2022-05-17 DIAGNOSIS — Z79.4 TYPE 2 DIABETES MELLITUS WITH MICROALBUMINURIA, WITH LONG-TERM CURRENT USE OF INSULIN (HCC): Primary | ICD-10-CM

## 2022-05-17 DIAGNOSIS — E11.29 TYPE 2 DIABETES MELLITUS WITH MICROALBUMINURIA, WITH LONG-TERM CURRENT USE OF INSULIN (HCC): Primary | ICD-10-CM

## 2022-05-17 DIAGNOSIS — D72.829 LEUKOCYTOSIS, UNSPECIFIED TYPE: ICD-10-CM

## 2022-05-17 DIAGNOSIS — R80.9 TYPE 2 DIABETES MELLITUS WITH MICROALBUMINURIA, WITH LONG-TERM CURRENT USE OF INSULIN (HCC): Primary | ICD-10-CM

## 2022-05-17 DIAGNOSIS — I10 ESSENTIAL HYPERTENSION: ICD-10-CM

## 2022-05-17 DIAGNOSIS — E78.1 HYPERTRIGLYCERIDEMIA: ICD-10-CM

## 2022-05-17 LAB
BASOPHILS %: 0.84 (ref 0–3)
BASOPHILS ABSOLUTE: 0.12 (ref 0–0.3)
EOSINOPHILS %: 1.7 (ref 0–10)
EOSINOPHILS ABSOLUTE: 0.24 (ref 0–1.1)
HBA1C MFR BLD: 10.1 %
HCT VFR BLD CALC: 49.5 % (ref 42–52)
HEMOGLOBIN: 15.8 (ref 13.8–17.8)
LYMPHOCYTE %: 36.56 (ref 20–51.1)
LYMPHOCYTES ABSOLUTE: 5.23 (ref 1–5.5)
MCH RBC QN AUTO: 29.4 PG (ref 28.5–32.5)
MCHC RBC AUTO-ENTMCNC: 31.9 G/DL (ref 32–37)
MCV RBC AUTO: 92.1 FL (ref 80–94)
MONOCYTES %: 4.91 (ref 1.7–9.3)
MONOCYTES ABSOLUTE: 0.7 (ref 0.1–1)
NEUTROPHILS %: 55.98 (ref 42.2–75.2)
NEUTROPHILS ABSOLUTE: 8 (ref 2–8.1)
PDW BLD-RTO: 13.5 % (ref 10–15.5)
PLATELET # BLD: 255.6 THOU/MM3 (ref 130–400)
RBC: 5.37 M/UL (ref 4.7–6.1)
WBC: 14.3 THOU/ML3 (ref 4.8–10.8)

## 2022-05-17 PROCEDURE — G8417 CALC BMI ABV UP PARAM F/U: HCPCS | Performed by: NURSE PRACTITIONER

## 2022-05-17 PROCEDURE — 99283 EMERGENCY DEPT VISIT LOW MDM: CPT

## 2022-05-17 PROCEDURE — 4004F PT TOBACCO SCREEN RCVD TLK: CPT | Performed by: NURSE PRACTITIONER

## 2022-05-17 PROCEDURE — 3046F HEMOGLOBIN A1C LEVEL >9.0%: CPT | Performed by: NURSE PRACTITIONER

## 2022-05-17 PROCEDURE — 83036 HEMOGLOBIN GLYCOSYLATED A1C: CPT | Performed by: NURSE PRACTITIONER

## 2022-05-17 PROCEDURE — 99214 OFFICE O/P EST MOD 30 MIN: CPT | Performed by: NURSE PRACTITIONER

## 2022-05-17 PROCEDURE — G8427 DOCREV CUR MEDS BY ELIG CLIN: HCPCS | Performed by: NURSE PRACTITIONER

## 2022-05-17 PROCEDURE — 2022F DILAT RTA XM EVC RTNOPTHY: CPT | Performed by: NURSE PRACTITIONER

## 2022-05-17 PROCEDURE — 71045 X-RAY EXAM CHEST 1 VIEW: CPT

## 2022-05-17 PROCEDURE — PBSHW POCT GLYCOSYLATED HEMOGLOBIN (HGB A1C): Performed by: NURSE PRACTITIONER

## 2022-05-17 RX ORDER — INSULIN GLARGINE 300 U/ML
40 INJECTION, SOLUTION SUBCUTANEOUS DAILY
Qty: 2 PEN | Refills: 3 | Status: SHIPPED | OUTPATIENT
Start: 2022-05-17

## 2022-05-17 RX ORDER — ONDANSETRON 4 MG/1
4 TABLET, ORALLY DISINTEGRATING ORAL EVERY 8 HOURS PRN
Qty: 12 TABLET | Refills: 0 | Status: SHIPPED | OUTPATIENT
Start: 2022-05-17

## 2022-05-17 RX ORDER — DULAGLUTIDE 3 MG/.5ML
3 INJECTION, SOLUTION SUBCUTANEOUS WEEKLY
Qty: 4 PEN | Refills: 3 | Status: SHIPPED | OUTPATIENT
Start: 2022-05-17

## 2022-05-17 ASSESSMENT — ENCOUNTER SYMPTOMS
DIARRHEA: 0
RESPIRATORY NEGATIVE: 1
SHORTNESS OF BREATH: 0
ABDOMINAL PAIN: 0

## 2022-05-17 ASSESSMENT — PAIN SCALES - GENERAL: PAINLEVEL_OUTOF10: 3

## 2022-05-17 ASSESSMENT — PAIN - FUNCTIONAL ASSESSMENT: PAIN_FUNCTIONAL_ASSESSMENT: 0-10

## 2022-05-17 NOTE — PROGRESS NOTES
[address], [address]  [facility] [address] [phone]        HISTORY:    Tonny Monsalve presents today for evaluation and management of:  Chief Complaint   Patient presents with    Follow-up    Diabetes       HPI    Interval History:    Past DM Medications   Glimepiride- hypoglcyemia.   januvia- glp1 started     Current Diabetic Medications  Metformin 1000 mg bid, lantus 25 units bid,trulicity 1.5 mg once weekly every monday,      DKA episodes: 0    21   He was recently seen by pcp and found to have significantly elevated a1c. 13.3%. he was started on metformin lantus januvia and trulicity. He does state he thinks about 4 years ago had an Er visit for pancreatitis. He does reside at a group home and recovering from drug addition, he works part time at a [employer].   Diet: regular  Exercise: none  BS testing: bid  104-341  Issues: denies      06/15/21   At previous visit [place] and [place] was stopped. bs are improving denies any s/s of hyper/hypoglcyemia. He also metioned hx of pancreatitis in 2015 however ct reviewed with no pancreatitis but did note left adrenal adenoma, waiting for dexamethasone test. He is also complaining of soft mass to his left upper arm. Denies pain, redness or swelling. Diet: lower carb  Exercise: walking  BS testing: bid 120-130  Issues: denies     22   Tonny Monsalve is a 40 y.o. male patient who presents to clinic today for his diabetes. he has a history of HTN, hyperlipidemia and obesity which contributes to his diabetes. At previous visit diabetes counseling was provided. he denies any current signs or symptoms of hyper/hypoglycemia. he states they are taking their medications as prescribed without any adverse effects.  He does admit to missing dose of insulin   Diet: regular  Exercise: none  BS testin-3 times a week  Issues: denies   Diabetic foot exam up-to-date: Yes  Diabetic retinal exam up-to-date: Yes  Hypoglycemia as needed treatment: juice    High cholesterol-  Takes lipitor and zetia and denies any adverse effects with its use.  Watches diet and exercise.      Hypertension-  Takes toprol xl, hctz, cozaar and denies any adverse effects with their use. Watches diet and exercise.  Denies any chest pain, dizziness or edema.       Obesity- Working on weight loss.        Past Medical History:   Diagnosis Date    B[facility] disorder ([facility]     History of heroin abuse ([facility]     Hypertension     Personality disorder ([address]      Family History   Problem Relation Age of Onset    High Blood Pressure Mother     Diabetes Mother     Kidney Disease Mother     Heart Disease Father     High Blood Pressure Father     Cancer Father      Social History     Tobacco Use    Smoking status: Current Every Day Smoker     Types: Cigarettes    Smokeless tobacco: Current User    Tobacco comment: one pack a day   Vaping Use    Vaping Use: Never used   Substance Use Topics    Alcohol use: Not Currently    Drug use: Not on file     Comment: 127 days sober     Allergies   Allergen Reactions    Amoxicillin     Other      Moral mushrooms    Pcn [Penicillins]      Thinks pcn        MEDICATIONS:  Current Outpatient Medications   Medication Sig Dispense Refill    Dulaglutide (TRULICITY) 3 EL/7.0BD SOPN Inject 3 mg into the skin once a week 4 pen 3    Insulin Glargine, 2 Unit Dial, (TOUJEO MAX SOLOSTAR) 300 UNIT/ML SOPN Inject 40 Units into the skin daily 2 pen 3    doxycycline hyclate (VIBRA-TABS) 100 MG tablet Take 1 tablet by mouth 2 times daily for 7 days 14 tablet 0    albuterol sulfate  (90 Base) MCG/ACT inhaler Inhale 2 puffs into the lungs every 4 hours as needed for Wheezing or Shortness of Breath (cough) 3 each 3    hydroCHLOROthiazide (HYDRODIURIL) 25 MG tablet Take 1 tablet by mouth every morning 30 tablet 2    losartan (COZAAR) 100 MG tablet Take 1 tablet by mouth daily 30 tablet 3    budesonide-formoterol (SYMBICORT) 160-4.5 MCG/ACT AERO INHALE 2 PUFFS BY MOUTH TWICE A DAY RINSE MOUTH AFTER USE 10.2 g 3    metoprolol succinate (TOPROL XL) 50 MG extended release tablet Take 1 tablet by mouth daily 90 tablet 1    metFORMIN (GLUCOPHAGE) 1000 MG tablet TAKE 1 TABLET BY MOUTH TWICE A DAY WITH MEALS 60 tablet 3    atorvastatin (LIPITOR) 20 MG tablet Take 1 tablet by mouth daily 30 tablet 3    Cholecalciferol (VITAMIN D3) 50 MCG (2000 UT) TABS TAKE 1 TABLET BY MOUTH DAILY 30 tablet 1    ezetimibe (ZETIA) 10 MG tablet TAKE 1 TABLET BY MOUTH DAILY 30 tablet 3    potassium chloride (KLOR-CON M) 20 MEQ extended release tablet TAKE 1 TABLET BY MOUTH DAILY 30 tablet 1    hydrOXYzine (ATARAX) 25 MG tablet Take 25 mg by mouth every 6 hours as needed       OXcarbazepine (TRILEPTAL) 300 MG tablet Take 1 tablet by mouth daily      benzonatate (TESSALON) 200 MG capsule Take 1 capsule by mouth 3 times daily as needed for Cough (Patient not taking: Reported on 5/17/2022) 15 capsule 0    Alcohol Swabs (ALCOHOL PREP) 70 % PADS TEST THREE TIMES A  each 3    ketoconazole (NIZORAL) 2 % cream Apply topically daily three times day 30 g 1    naproxen (NAPROSYN) 500 MG tablet TAKE 1 TABLET BY MOUTH TWICE A DAY AS NEEDED FOR PAIN (Patient not taking: Reported on 5/17/2022) 60 tablet 0    Insulin Pen Needle (ULTICARE SHORT PEN NEEDLES) 31G X 8 MM MISC USE WITH LANTUS TWICE A  each 3    Blood Pressure Monitoring (B-D ASSURE BPM/MANUAL ARM CUFF) MISC 1 applicator by Does not apply route 2 times daily Check BP twice daily 1 each 0    blood glucose monitor kit and supplies Dispense all needed supplies to include: monitor, strips, lancing device, lancets, control solutions, alcohol swabs. 1 kit 0    Lancets MISC 1 each by Does not apply route 3 times daily 300 each 3    blood glucose monitor strips Test 3 times a day & as needed for symptoms of irregular blood glucose.  Dispense sufficient amount for indicated testing frequency. 300 strip 3     No current facility-administered medications for this visit. Review ofSymptoms:  Review of Systems   Constitutional: Positive for fatigue. Negative for unexpected weight change. Eyes: Negative for visual disturbance. Respiratory: Negative. Negative for shortness of breath. Cardiovascular: Negative for chest pain and leg swelling. Gastrointestinal: Negative for abdominal pain and diarrhea. Endocrine: Negative for polydipsia, polyphagia and polyuria. Genitourinary: Negative. Musculoskeletal: Negative. Skin: Negative for rash and wound. Neurological: Negative for dizziness, tremors, seizures and headaches. Psychiatric/Behavioral: Negative. Negative for confusion and decreased concentration. Theremainder of a complete 14-point review of systems is negative. Vital Signs: /88 (Site: Right Upper Arm, Position: Sitting, Cuff Size: Medium Adult)   Pulse 92   Resp 18   Ht 5' 11\" (1.803 m)   Wt 298 lb (135.2 kg)   BMI 41.56 kg/m²      Wt Readings from Last 3 Encounters:   05/17/22 298 lb (135.2 kg)   05/16/22 (!) 300 lb 6.4 oz (136.3 kg)   04/12/22 (!) 300 lb 12.8 oz (136.4 kg)     Body mass index is 41.56 kg/m².   LABS:  Hemoglobin A1C   Date Value Ref Range Status   05/17/2022 10.1 % Final   10/11/2021 6.6 % Final     Lab Results   Component Value Date    LABMICR 76.9 (H) 05/16/2022     Lab Results   Component Value Date     05/16/2022    K 3.9 05/16/2022    CL 99 05/16/2022    CO2 31 05/16/2022    BUN 11 05/16/2022    CREATININE 0.7 05/16/2022    GLUCOSE 147 (H) 05/16/2022    CALCIUM 9.7 05/16/2022    PROT 7.6 02/12/2019    LABALBU 4.4 05/16/2022    BILITOT 0.5 05/16/2022    ALKPHOS 82 05/16/2022    AST 46 05/16/2022    ALT 48 05/16/2022    LABGLOM > 60.0 05/16/2022    GFRAA >60 02/12/2019    GLOB 3.5 05/16/2022     Lab Results   Component Value Date    CHOL 113 05/16/2022    CHOL 174 02/12/2019 Lab Results   Component Value Date    TRIG 97 05/16/2022    TRIG 147 02/12/2019     Lab Results   Component Value Date    HDL 35 (L) 05/16/2022    HDL 44 02/12/2019     Lab Results   Component Value Date    LDLCHOLESTEROL 101 02/12/2019    LDLCALC 58.6 05/16/2022     Lab Results   Component Value Date    VLDL 19 05/16/2022    VLDL NOT REPORTED 02/12/2019     Lab Results   Component Value Date    CHOLHDLRATIO 3.23 05/16/2022    CHOLHDLRATIO 4.0 02/12/2019           Physical Exam  Constitutional:       Appearance: He is well-developed. Eyes:      Pupils: Pupils are equal, round, and reactive to light. Cardiovascular:      Rate and Rhythm: Normal rate and regular rhythm. Pulmonary:      Effort: Pulmonary effort is normal.      Breath sounds: Normal breath sounds. Skin:     General: Skin is warm and dry. Findings: No lesion (no lipohypertrophy) or rash. Neurological:      Mental Status: He is alert and oriented to person, place, and time. Sensory: No sensory deficit. Psychiatric:         Speech: Speech normal.         Behavior: Behavior normal.         Thought Content: Thought content normal.         Judgment: Judgment normal.             ASSESSMENT/PLAN:     Diagnosis Orders   1. Type 2 diabetes mellitus with microalbuminuria, with long-term current use of insulin ([facility])  POCT Hb A1C (glycosylated hemoglobin)    Dulaglutide (TRULICITY) 3 EY/0.2NF SOPN    Insulin Glargine, 2 Unit Dial, (TOUJEO MAX SOLOSTAR) 300 UNIT/ML SOPN   2. Hypertriglyceridemia     3. Essential hypertension     4.  BMI 40.0-44.9, adult ([facility]       Orders Placed This Encounter   Procedures    POCT Hb A1C (glycosylated hemoglobin)     Orders Placed This Encounter   Medications    Dulaglutide (TRULICITY) 3 QW/5.1TW SOPN     Sig: Inject 3 mg into the skin once a week     Dispense:  4 pen     Refill:  3    Insulin Glargine, 2 Unit Dial, (TOUJEO MAX SOLOSTAR) 300 UNIT/ML SOPN     Sig: Inject 40 Units into the skin daily Dispense:  2 pen     Refill:  3     Requested Prescriptions     Signed Prescriptions Disp Refills    Dulaglutide (TRULICITY) 3 RC/0.1FC SOPN 4 pen 3     Sig: Inject 3 mg into the skin once a week    Insulin Glargine, 2 Unit Dial, (TOUJEO MAX SOLOSTAR) 300 UNIT/ML SOPN 2 pen 3     Sig: Inject 40 Units into the skin daily       1. Type 2 diabetes mellitus with microalbuminuria, with long-term current use of insulin (HCC)  - Unstable  HbA1C goal is less than 7%. - Fasting blood glucose goal is 70-120mg/dl and postprandial blood sugar goal is less than 180 mg/dl. - Labs reviewed including most recent A1c, microalbumin and kidney function. Repeat labs due in 3 months.    -We discussed in great detail dietary modifications they can make to better improve their blood sugars. -follow up diabetes education completed, all questions answered. -does not have glucose readings, non complaint with medications. Will change to ultra long acting insulin once daily for better compliance and increase trulicity.   -close follow up is recommended. Discussed signs and symptoms of hyper/hypoglycemia and how to treat. Encouraged 150 minutes of physical activity per week. Follow a low carbohydrate diet. Encouraged at least 7 hours of sleep. The patient was informed of the goals of diabetes management. This can only be accomplished by watching their diet and exercise levels. We certainly use medicines to help attain these goals. The consequences of not controlling blood sugars were discussed. These include blindness, heart disease, stroke, kidney disease, and possibly need for dialysis. They were told to be careful with their foot care as diabetics often have nerve damage, infections and risk for limb amutations . They also need a dilated eye exam yearly.  We discussed the issues of diet, exercise, medication, complication avoidance, reviewed the signs and symptoms of diabetes, hypoglycemic episodes, significance of HbA1C.       - POCT Hb A1C (glycosylated hemoglobin)  - Dulaglutide (TRULICITY) 3 NR/8.6AE SOPN; Inject 3 mg into the skin once a week  Dispense: 4 pen; Refill: 3  - Insulin Glargine, 2 Unit Dial, (TOUJEO MAX SOLOSTAR) 300 UNIT/ML SOPN; Inject 40 Units into the skin daily  Dispense: 2 pen; Refill: 3    2. Hypertriglyceridemia  stable, lipid panel reviewed, continue current medications. Diet and exercise      3. Essential hypertension   stable, continue current medications. Diet and exercise Seek emergent care if chest pain develops. 4. BMI 40.0-44.9, adult (HCC)  Reduce calories and increase physical activity to achieve a slow and steady weight loss to improve blood pressure, cholesterol and diabetes. Answered all patient questions. Agrees to follow plan of care and to follow up in 1 months, sooner if needed. Call office if unexplained blood sugars less than 70 occur or above 400. Call office or access MyChart with any further questions or concerns. Be sure to bring glucometer/food log at next appointment. Total time spent reviewing chart, labs, counseling patient and documenting on the date of the encounter: 30 min.       Electronically signed by DAWIT Jimenez CNP on 5/17/2022 at 1:21 PM      (Please note that portions of this note were completed with a voice-recognition program. Efforts were made to edit the dictation but occasionally words are mis-transcribed.)

## 2022-05-17 NOTE — RESULT ENCOUNTER NOTE
Spoke with pt re: worsening of WBC's he threw up quite a bit last night but not today. He doesn't feel worse he says. He is continuing all his meds and tolerating, I told him to go to [facility] er.  He is going

## 2022-05-18 ASSESSMENT — ENCOUNTER SYMPTOMS
ABDOMINAL PAIN: 0
CONSTIPATION: 1
COUGH: 1
NAUSEA: 1
SHORTNESS OF BREATH: 0
WHEEZING: 0
VOMITING: 1
SORE THROAT: 0

## 2022-05-18 NOTE — ED PROVIDER NOTES
[address]      Pt Name: Tonny Monsalve  MRN: [id number]  [place] 1975  Date of evaluation: 5/17/2022      CHIEF COMPLAINT       Chief Complaint   Patient presents with    Other     PCP sent to ER d/t WBC elevation         HISTORY OF PRESENT ILLNESS    Tonny Monsalve is a 40 y.o. male who presents to the emergency department for evaluation of bronchitis, nausea, vomiting, constipation and elevated white blood cell count. Patient has been having nonproductive cough for last 2 weeks and had chest x-ray done as an outpatient which revealed findings suggestive of bronchitis. Patient has had CBC 1 day prior to arrival which revealed white blood cell count 12.5 thousand and the repeat white blood cell count is 14.3 thousand in the afternoon on the day of presentation. Primary care physician called the patient to come to the emergency department. Patient was started on doxycycline 100 mg twice daily and patient has taken 3 doses so far as well as prescribe some cough medicine. He has also been taking Mucinex as needed for the cough and MiraLAX for the constipation. Patient has had urinalysis which was negative as an outpatient. He also has had basic metabolic panel and lipid profile as well as liver function test which were unremarkable. He has had nausea and 4-5 episodes of vomiting the previous night on May 16, 2022 but denies any vomiting or diarrhea on the day of evaluation. He has been having constipation with small amount of bowel movement 1 day prior to arrival and small amount on the day of evaluation. He denies any abdominal pain and denies any urinary symptoms. There are no exacerbating or relieving factors. REVIEW OF SYSTEMS       Review of Systems   Constitutional: Negative for chills and fever. HENT: Negative for congestion and sore throat. Respiratory: Positive for cough. Negative for shortness of breath and wheezing. Cardiovascular: Negative for chest pain and palpitations. Gastrointestinal: Positive for constipation, nausea and vomiting. Negative for abdominal pain. Genitourinary: Negative for dysuria and frequency. Neurological: Negative for dizziness and light-headedness. All other systems reviewed and are negative. PAST MEDICAL HISTORY    has a past medical history of Bipolar 1 disorder ([facility] [address] [address] History of heroin abuse ([facility] [facility] Hypertension, and Personality disorder ([facility] [address] [facility] SURGICAL HISTORY      has a past surgical history that includes knee surgery (Left) and Finger surgery (Left).     CURRENT MEDICATIONS       Discharge Medication List as of 5/17/2022  8:53 PM      CONTINUE these medications which have NOT CHANGED    Details   Dulaglutide (TRULICITY) 3 XP/1.6BJ SOPN Inject 3 mg into the skin once a week, Disp-4 pen, R-3Normal      Insulin Glargine, 2 Unit Dial, (TOUJEO MAX SOLOSTAR) 300 UNIT/ML SOPN Inject 40 Units into the skin daily, Disp-2 pen, R-3Normal      doxycycline hyclate (VIBRA-TABS) 100 MG tablet Take 1 tablet by mouth 2 times daily for 7 days, Disp-14 tablet, R-0Normal      benzonatate (TESSALON) 200 MG capsule Take 1 capsule by mouth 3 times daily as needed for Cough, Disp-15 capsule, R-0Normal      Alcohol Swabs (ALCOHOL PREP) 70 % PADS Disp-100 each, R-3, Normal      albuterol sulfate  (90 Base) MCG/ACT inhaler Inhale 2 puffs into the lungs every 4 hours as needed for Wheezing or Shortness of Breath (cough), Disp-3 each, R-3Normal      hydroCHLOROthiazide (HYDRODIURIL) 25 MG tablet Take 1 tablet by mouth every morning, Disp-30 tablet, R-2Maximum Refills ReachedNormal      losartan (COZAAR) 100 MG tablet Take 1 tablet by mouth daily, Disp-30 tablet, R-3Maximum Refills ReachedNormal      budesonide-formoterol (SYMBICORT) 160-4.5 MCG/ACT AERO INHALE 2 PUFFS BY MOUTH TWICE A DAY RINSE MOUTH AFTER USE, Disp-10.2 g, R-3Maximum Refills ReachedNormal      metoprolol succinate (TOPROL XL) 50 MG extended release tablet Take 1 tablet by mouth daily, Disp-90 tablet, R-1Maximum Refills ReachedNormal      metFORMIN (GLUCOPHAGE) 1000 MG tablet TAKE 1 TABLET BY MOUTH TWICE A DAY WITH MEALS, Disp-60 tablet, R-3Maximum Refills ReachedNormal      atorvastatin (LIPITOR) 20 MG tablet Take 1 tablet by mouth daily, Disp-30 tablet, R-3Maximum Refills ReachedNormal      ketoconazole (NIZORAL) 2 % cream Apply topically daily three times day, Disp-30 g, R-1, Normal      Cholecalciferol (VITAMIN D3) 50 MCG (2000 UT) TABS TAKE 1 TABLET BY MOUTH DAILY, Disp-30 tablet, R-1Maximum Refills ReachedNormal      ezetimibe (ZETIA) 10 MG tablet TAKE 1 TABLET BY MOUTH DAILY, Disp-30 tablet, R-3Maximum Refills ReachedNormal      naproxen (NAPROSYN) 500 MG tablet TAKE 1 TABLET BY MOUTH TWICE A DAY AS NEEDED FOR PAIN, Disp-60 tablet, R-0Authorized Quantity ExceededNormal      potassium chloride (KLOR-CON M) 20 MEQ extended release tablet TAKE 1 TABLET BY MOUTH DAILY, Disp-30 tablet, R-1Maximum Refills ReachedNormal      Insulin Pen Needle (ULTICARE SHORT PEN NEEDLES) 31G X 8 MM MISC Disp-100 each, R-3, NormalUSE WITH LANTUS TWICE A DAY      Blood Pressure Monitoring (B-D ASSURE BPM/MANUAL ARM CUFF) MISC 2 TIMES DAILY Starting Wed 6/9/2021, Disp-1 each, R-0, PrintCheck BP twice daily      hydrOXYzine (ATARAX) 25 MG tablet Take 25 mg by mouth every 6 hours as needed Historical Med      blood glucose monitor kit and supplies Dispense all needed supplies to include: monitor, strips, lancing device, lancets, control solutions, alcohol swabs., Disp-1 kit, R-0, Normal      Lancets MISC 3 TIMES DAILY Starting Wed 3/24/2021, Disp-300 each, R-3, Normal      blood glucose monitor strips Test 3 times a day & as needed for symptoms of irregular blood glucose. Dispense sufficient amount for indicated testing frequency. , Disp-300 strip, R-3, Normal      OXcarbazepine (TRILEPTAL) 300 MG tablet Take 1 tablet by mouth dailyHistorical Med ALLERGIES     is allergic to amoxicillin, other, and pcn [penicillins]. FAMILY HISTORY     He indicated that the status of his mother is unknown. He indicated that the status of his father is unknown.     family history includes Cancer in his father; Diabetes in his mother; Heart Disease in his father; High Blood Pressure in his father and mother; Kidney Disease in his mother. SOCIAL HISTORY      reports that he has been smoking cigarettes. He uses smokeless tobacco. He reports previous alcohol use. PHYSICAL EXAM     INITIAL VITALS:  height is 5' 11\" (1.803 m) and weight is 302 lb 12.8 oz (137.3 kg) (abnormal). His tympanic temperature is 97 °F (36.1 °C). His blood pressure is 147/93 (abnormal) and his pulse is 85. His respiration is 17 and oxygen saturation is 94%. Physical Exam  Vitals and nursing note reviewed. Constitutional:       Appearance: He is well-developed. HENT:      Head: Normocephalic and atraumatic. Nose: Nose normal.   Eyes:      Extraocular Movements: Extraocular movements intact. Pupils: Pupils are equal, round, and reactive to light. Cardiovascular:      Rate and Rhythm: Normal rate and regular rhythm. Heart sounds: Normal heart sounds. No murmur heard. Pulmonary:      Effort: Pulmonary effort is normal. No respiratory distress. Breath sounds: Normal breath sounds. Abdominal:      General: Bowel sounds are normal. There is no distension. Palpations: Abdomen is soft. Tenderness: There is no abdominal tenderness. Musculoskeletal:      Cervical back: Normal range of motion and neck supple. Skin:     General: Skin is warm and dry. Neurological:      General: No focal deficit present. Mental Status: He is alert and oriented to person, place, and time.           DIFFERENTIAL DIAGNOSIS/ MDM:     Viral URI with cough, acute bronchitis, early pneumonia, influenza, COVID-19    DIAGNOSTIC RESULTS     EKG: All EKG's are interpreted by reviewed the disposition diagnosis with the patient and or their family/guardian. I have answered their questions and given discharge instructions. They voiced understanding of these instructions and did not have any further questions or complaints. Re-evaluation Notes    Patient is resting comfortably and does not appear to be in any pain or distress prior to discharge    CRITICAL CARE:   None        CONSULTS:      PROCEDURES:  None    FINAL IMPRESSION      1. Upper respiratory tract infection, unspecified type    2. Leukocytosis, unspecified type          DISPOSITION/PLAN   DISPOSITION Decision To Discharge    Condition on Disposition    Stable    PATIENT REFERRED TO:  Dariel Stone DO   Giselle Norton [address]  [phone]    Call in 3 days  For reevaluation of current symptoms    [address] [address].  [phone]    If symptoms worsen      DISCHARGE MEDICATIONS:  Discharge Medication List as of 5/17/2022  8:53 PM      START taking these medications    Details   ondansetron (ZOFRAN ODT) 4 MG disintegrating tablet Take 1 tablet by mouth every 8 hours as needed for Nausea, Disp-12 tablet, R-0Normal             (Please note that portions of this note were completed with a voice recognition program.  Efforts were made to edit the dictations but occasionally words are mis-transcribed.)    Geo Yancey MD,, MD, F.A.C.E.P.   Attending Emergency Physician                         Geo Yancey MD  05/18/22 [id number]

## 2022-05-19 ENCOUNTER — OFFICE VISIT (OUTPATIENT)
Dept: FAMILY MEDICINE CLINIC | Age: 47
End: 2022-05-19
Payer: COMMERCIAL

## 2022-05-19 VITALS
WEIGHT: 297.8 LBS | SYSTOLIC BLOOD PRESSURE: 140 MMHG | BODY MASS INDEX: 41.69 KG/M2 | RESPIRATION RATE: 16 BRPM | OXYGEN SATURATION: 96 % | TEMPERATURE: 96.1 F | HEIGHT: 71 IN | HEART RATE: 88 BPM | DIASTOLIC BLOOD PRESSURE: 80 MMHG

## 2022-05-19 DIAGNOSIS — J20.9 ACUTE BRONCHITIS, UNSPECIFIED ORGANISM: Primary | ICD-10-CM

## 2022-05-19 DIAGNOSIS — D72.829 LEUKOCYTOSIS, UNSPECIFIED TYPE: ICD-10-CM

## 2022-05-19 DIAGNOSIS — D72.829 LEUKOCYTOSIS, UNSPECIFIED TYPE: Primary | ICD-10-CM

## 2022-05-19 LAB
BASOPHILS %: 0.78 (ref 0–3)
BASOPHILS ABSOLUTE: 0.12 (ref 0–0.3)
EOSINOPHILS %: 2.34 (ref 0–10)
EOSINOPHILS ABSOLUTE: 0.35 (ref 0–1.1)
HCT VFR BLD CALC: 50.8 % (ref 42–52)
HEMOGLOBIN: 16.1 (ref 13.8–17.8)
LYMPHOCYTE %: 32.26 (ref 20–51.1)
LYMPHOCYTES ABSOLUTE: 4.78 (ref 1–5.5)
MCH RBC QN AUTO: 29.2 PG (ref 28.5–32.5)
MCHC RBC AUTO-ENTMCNC: 31.7 G/DL (ref 32–37)
MCV RBC AUTO: 91.9 FL (ref 80–94)
MONOCYTES %: 5.6 (ref 1.7–9.3)
MONOCYTES ABSOLUTE: 0.83 (ref 0.1–1)
NEUTROPHILS %: 59.03 (ref 42.2–75.2)
NEUTROPHILS ABSOLUTE: 8.74 (ref 2–8.1)
PDW BLD-RTO: 13.6 % (ref 10–15.5)
PLATELET # BLD: 255.1 THOU/MM3 (ref 130–400)
RBC: 5.52 M/UL (ref 4.7–6.1)
WBC: 14.8 THOU/ML3 (ref 4.8–10.8)

## 2022-05-19 PROCEDURE — G8427 DOCREV CUR MEDS BY ELIG CLIN: HCPCS | Performed by: FAMILY MEDICINE

## 2022-05-19 PROCEDURE — G8417 CALC BMI ABV UP PARAM F/U: HCPCS | Performed by: FAMILY MEDICINE

## 2022-05-19 PROCEDURE — 99213 OFFICE O/P EST LOW 20 MIN: CPT | Performed by: FAMILY MEDICINE

## 2022-05-19 PROCEDURE — 4004F PT TOBACCO SCREEN RCVD TLK: CPT | Performed by: FAMILY MEDICINE

## 2022-05-19 PROCEDURE — 99212 OFFICE O/P EST SF 10 MIN: CPT | Performed by: FAMILY MEDICINE

## 2022-05-19 RX ORDER — LEVOFLOXACIN 500 MG/1
500 TABLET, FILM COATED ORAL DAILY
Qty: 10 TABLET | Refills: 0 | Status: SHIPPED | OUTPATIENT
Start: 2022-05-19 | End: 2022-05-29

## 2022-05-19 ASSESSMENT — ENCOUNTER SYMPTOMS
WHEEZING: 0
CHOKING: 0
SHORTNESS OF BREATH: 0
CHEST TIGHTNESS: 0
COUGH: 1
PHOTOPHOBIA: 0
ABDOMINAL PAIN: 0

## 2022-05-19 NOTE — PATIENT INSTRUCTIONS
Nutrition Health Education:    Keep hydrated, walk 30 minutes minimum 3 times weekly as tolerated. Diet should consist of low fat, low sodium and high fiber. Nutritious foods such as fruits (if you're not diabetic), vegetables, lean meats, lean dairy, whole grains such as brown rice, quinoa, and dry beans. [place] with small amounts of heart healthy extra virgin olive oil. Be watchful of any extra salt/sugar/seasoning to your food. You should eat no more than 2 grams or 2,000 mg of salt daily. Salt will raise your BP. Avoid regular/diet sodas, caffeine, energy drinks as these are full of artificial ingredients/sweeteners, sugar, salt and chemicals that spike insulin and are harmful to your health. Sugar intake increases metabolic disfunction, type 2 diabetes, insulin resistance, addictive food behavior and obesity. Avoid all processed foods, foods from boxes, cans, microwave meals as these contain high salt, sugar or fat content and not much nutrition. Get at least 8 hrs of sleep every night and turn off all electronics at least 1 hour before bedtime as these decreases melatonin production and increases wakefulness. If your cholesterol is high, no greasy, fried, fast or fatty foods. Decrease red meat intake. No butter, carballo, lard or creams, no milk as these things clog your arteries and leads to heart attacks and death. If you smoke, smoking increases risk of lung disease, cancers, high BP, heart attack, stroke and death. Take your daily medications as prescribed and inform your family doctor if you are having any side effects or issues taking medications.     continue current therapy as pt is improving  Note for work to go back Monday  Cbc today    return to office as scheduled

## 2022-05-19 NOTE — RESULT ENCOUNTER NOTE
I called pt to let him know that his white count is higher and I am stopping the doxycycline and starting him on Levaquin 500 mg every day once a day and repeat cbc Saturday. He will drop by and  the lab requisition.

## 2022-05-19 NOTE — PROGRESS NOTES
Name: Lenny Wright  DOS: 5/19/2022  MRN: [id number]      Subjective:  Lenny Wright is a 40 y.o. male being seen for   Chief Complaint   Patient presents with    Follow-up     Labs completed on 05/16/2022, A1c done on 05/17/2022-10.1       Vitals:    05/19/22 1219   BP:    Pulse: 88   Resp: 16   Temp:    SpO2: 96%     Allergies   Allergen Reactions    Amoxicillin     Other      Moral mushrooms    Pcn [Penicillins]      Thinks pcn      Past Medical History:   Diagnosis Date    [facility]     History of heroin abuse ([facility]     Hypertension     Personality disorder ([facility]      Past Surgical History:   Procedure Laterality Date    FINGER SURGERY Left     index finger    KNEE SURGERY Left     had metal removed     Social History     Socioeconomic History    Marital status: Single     Spouse name: None    Number of children: None    Years of education: None    Highest education level: None   Occupational History    None   Tobacco Use    Smoking status: Current Every Day Smoker     Types: Cigarettes    Smokeless tobacco: Current User    Tobacco comment: one pack a day   Vaping Use    Vaping Use: Never used   Substance and Sexual Activity    Alcohol use: Not Currently    Drug use: None     Comment: 127 days sober    Sexual activity: None   Other Topics Concern    None   Social History Narrative    None     Social Determinants of Health     Financial Resource Strain: Low Risk     Difficulty of Paying Living Expenses: Not hard at all   Food Insecurity: No Food Insecurity    Worried About Running Out of Food in the Last Year: Never true    Zeke of Food in the Last Year: Never true   Transportation Needs:     Lack of Transportation (Medical): Not on file    Lack of Transportation (Non-Medical):  Not on file   Physical Activity:     Days of Exercise per Week: Not on file    Minutes of Exercise per Session: Not on file   Stress:     Feeling of Stress : Not on file Social Connections:     Frequency of Communication with Friends and Family: Not on file    Frequency of Social Gatherings with Friends and Family: Not on file    Attends Shinto Services: Not on file    Active Member of Clubs or Organizations: Not on file    Attends Club or Organization Meetings: Not on file    Marital Status: Not on file   Intimate Partner Violence:     Fear of Current or Ex-Partner: Not on file    Emotionally Abused: Not on file    Physically Abused: Not on file    Sexually Abused: Not on file   Housing Stability:     Unable to Pay for Housing in the Last Year: Not on file    Number of [place] in the Last Year: Not on file    Unstable Housing in the Last Year: Not on file       Current Outpatient Medications   Medication Sig Dispense Refill    Dulaglutide (TRULICITY) 3 PZ/2.4SK SOPN Inject 3 mg into the skin once a week 4 pen 3    Insulin Glargine, 2 Unit Dial, (TOUJEO MAX SOLOSTAR) 300 UNIT/ML SOPN Inject 40 Units into the skin daily 2 pen 3    ondansetron (ZOFRAN ODT) 4 MG disintegrating tablet Take 1 tablet by mouth every 8 hours as needed for Nausea 12 tablet 0    doxycycline hyclate (VIBRA-TABS) 100 MG tablet Take 1 tablet by mouth 2 times daily for 7 days 14 tablet 0    benzonatate (TESSALON) 200 MG capsule Take 1 capsule by mouth 3 times daily as needed for Cough 15 capsule 0    Alcohol Swabs (ALCOHOL PREP) 70 % PADS TEST THREE TIMES A  each 3    albuterol sulfate  (90 Base) MCG/ACT inhaler Inhale 2 puffs into the lungs every 4 hours as needed for Wheezing or Shortness of Breath (cough) 3 each 3    hydroCHLOROthiazide (HYDRODIURIL) 25 MG tablet Take 1 tablet by mouth every morning 30 tablet 2    losartan (COZAAR) 100 MG tablet Take 1 tablet by mouth daily 30 tablet 3    budesonide-formoterol (SYMBICORT) 160-4.5 MCG/ACT AERO INHALE 2 PUFFS BY MOUTH TWICE A DAY RINSE MOUTH AFTER USE 10.2 g 3    metoprolol succinate (TOPROL XL) 50 MG extended release tablet Take 1 tablet by mouth daily 90 tablet 1    metFORMIN (GLUCOPHAGE) 1000 MG tablet TAKE 1 TABLET BY MOUTH TWICE A DAY WITH MEALS 60 tablet 3    atorvastatin (LIPITOR) 20 MG tablet Take 1 tablet by mouth daily 30 tablet 3    ketoconazole (NIZORAL) 2 % cream Apply topically daily three times day 30 g 1    Cholecalciferol (VITAMIN D3) 50 MCG (2000 UT) TABS TAKE 1 TABLET BY MOUTH DAILY 30 tablet 1    ezetimibe (ZETIA) 10 MG tablet TAKE 1 TABLET BY MOUTH DAILY 30 tablet 3    naproxen (NAPROSYN) 500 MG tablet TAKE 1 TABLET BY MOUTH TWICE A DAY AS NEEDED FOR PAIN 60 tablet 0    potassium chloride (KLOR-CON M) 20 MEQ extended release tablet TAKE 1 TABLET BY MOUTH DAILY 30 tablet 1    Insulin Pen Needle (ULTICARE SHORT PEN NEEDLES) 31G X 8 MM MISC USE WITH LANTUS TWICE A  each 3    Blood Pressure Monitoring (B-D ASSURE BPM/MANUAL ARM CUFF) MISC 1 applicator by Does not apply route 2 times daily Check BP twice daily 1 each 0    hydrOXYzine (ATARAX) 25 MG tablet Take 25 mg by mouth every 6 hours as needed       blood glucose monitor kit and supplies Dispense all needed supplies to include: monitor, strips, lancing device, lancets, control solutions, alcohol swabs. 1 kit 0    Lancets MISC 1 each by Does not apply route 3 times daily 300 each 3    blood glucose monitor strips Test 3 times a day & as needed for symptoms of irregular blood glucose. Dispense sufficient amount for indicated testing frequency. 300 strip 3    OXcarbazepine (TRILEPTAL) 300 MG tablet Take 1 tablet by mouth daily       No current facility-administered medications for this visit. Objective:  Pt is here for F/U pt is 70% improved with the cough. He is not coughing as much. Review of Systems   Constitutional: Negative for fatigue and unexpected weight change. Eyes: Negative for photophobia and visual disturbance.    Respiratory: Positive for cough (minimal coughing less sputum and clear now, no coughing spelks). Negative for choking, chest tightness, shortness of breath and wheezing. Cardiovascular: Negative for chest pain, palpitations and leg swelling. Gastrointestinal: Negative for abdominal pain. Genitourinary: Negative for difficulty urinating. Musculoskeletal: Negative for arthralgias and myalgias. Skin: Negative for rash and wound. Neurological: Negative for dizziness, weakness, light-headedness, numbness and headaches. Hematological: Negative for adenopathy. Psychiatric/Behavioral: Negative for agitation, confusion, decreased concentration and suicidal ideas. Physical Exam  Constitutional:       General: He is not in acute distress. Appearance: Normal appearance. He is obese. He is not ill-appearing, toxic-appearing or diaphoretic. HENT:      Head: Normocephalic and atraumatic. Right Ear: External ear normal.      Left Ear: External ear normal.      Nose: Nose normal. No congestion or rhinorrhea. Mouth/Throat:      Mouth: Mucous membranes are moist.      Pharynx: Oropharynx is clear. No oropharyngeal exudate or posterior oropharyngeal erythema. Eyes:      Extraocular Movements: Extraocular movements intact. Conjunctiva/sclera: Conjunctivae normal.      Pupils: Pupils are equal, round, and reactive to light. Cardiovascular:      Rate and Rhythm: Normal rate and regular rhythm. Pulses: Normal pulses. Heart sounds: Normal heart sounds. No murmur heard. Pulmonary:      Effort: Pulmonary effort is normal.      Breath sounds: Normal breath sounds. No wheezing, rhonchi or rales. Abdominal:      General: Abdomen is flat. Bowel sounds are normal. There is no distension. Palpations: Abdomen is soft. There is no mass. Tenderness: There is no abdominal tenderness. There is no guarding. Musculoskeletal:         General: Normal range of motion. Cervical back: Normal range of motion and neck supple. Right lower leg: No edema.       Left wakefulness. If your cholesterol is high, no greasy, fried, fast or fatty foods. Decrease red meat intake. No butter, carballo, lard or creams, no milk as these things clog your arteries and leads to heart attacks and death. If you smoke, smoking increases risk of lung disease, cancers, high BP, heart attack, stroke and death. Take your daily medications as prescribed and inform your family doctor if you are having any side effects or issues taking medications. continue current therapy as pt is improving  Note for work to go back Monday  Cbc today    return to office as scheduled         Return today (on 5/19/2022), or if symptoms worsen or fail to improve, for as scheduled.      Sabine Roach, DO

## 2022-05-21 LAB
BASOPHILS %: 0.87 (ref 0–3)
BASOPHILS ABSOLUTE: 0.12 (ref 0–0.3)
EOSINOPHILS %: 1.5 (ref 0–10)
EOSINOPHILS ABSOLUTE: 0.21 (ref 0–1.1)
HCT VFR BLD CALC: 52 % (ref 42–52)
HEMOGLOBIN: 16.5 (ref 13.8–17.8)
LYMPHOCYTE %: 32.01 (ref 20–51.1)
LYMPHOCYTES ABSOLUTE: 4.53 (ref 1–5.5)
MCH RBC QN AUTO: 29 PG (ref 28.5–32.5)
MCHC RBC AUTO-ENTMCNC: 31.8 G/DL (ref 32–37)
MCV RBC AUTO: 91.3 FL (ref 80–94)
MONOCYTES %: 5.83 (ref 1.7–9.3)
MONOCYTES ABSOLUTE: 0.83 (ref 0.1–1)
NEUTROPHILS %: 59.79 (ref 42.2–75.2)
NEUTROPHILS ABSOLUTE: 8.47 (ref 2–8.1)
PDW BLD-RTO: 13.5 % (ref 10–15.5)
PLATELET # BLD: 249.5 THOU/MM3 (ref 130–400)
RBC: 5.7 M/UL (ref 4.7–6.1)
WBC: 14.2 THOU/ML3 (ref 4.8–10.8)

## 2022-05-24 DIAGNOSIS — J45.40 MODERATE PERSISTENT ASTHMA, UNSPECIFIED WHETHER COMPLICATED: ICD-10-CM

## 2022-05-24 DIAGNOSIS — M25.561 CHRONIC PAIN OF RIGHT KNEE: ICD-10-CM

## 2022-05-24 DIAGNOSIS — E55.9 VITAMIN D DEFICIENCY: ICD-10-CM

## 2022-05-24 DIAGNOSIS — J40 BRONCHITIS: ICD-10-CM

## 2022-05-24 DIAGNOSIS — F11.21 OPIOID DEPENDENCE IN REMISSION (HCC): ICD-10-CM

## 2022-05-24 DIAGNOSIS — R35.89 POLYURIA: ICD-10-CM

## 2022-05-24 DIAGNOSIS — I10 ESSENTIAL HYPERTENSION: ICD-10-CM

## 2022-05-24 DIAGNOSIS — R06.2 WHEEZING: ICD-10-CM

## 2022-05-24 DIAGNOSIS — E78.1 HYPERTRIGLYCERIDEMIA: ICD-10-CM

## 2022-05-24 DIAGNOSIS — Z79.4 TYPE 2 DIABETES MELLITUS WITH MICROALBUMINURIA, WITH LONG-TERM CURRENT USE OF INSULIN (HCC): ICD-10-CM

## 2022-05-24 DIAGNOSIS — G89.29 OTHER CHRONIC PAIN: ICD-10-CM

## 2022-05-24 DIAGNOSIS — E87.6 HYPOKALEMIA: ICD-10-CM

## 2022-05-24 DIAGNOSIS — G89.29 CHRONIC BILATERAL LOW BACK PAIN WITH RIGHT-SIDED SCIATICA: ICD-10-CM

## 2022-05-24 DIAGNOSIS — B35.9 RINGWORM: ICD-10-CM

## 2022-05-24 DIAGNOSIS — F17.200 SMOKER: ICD-10-CM

## 2022-05-24 DIAGNOSIS — E11.29 TYPE 2 DIABETES MELLITUS WITH MICROALBUMINURIA, WITH LONG-TERM CURRENT USE OF INSULIN (HCC): ICD-10-CM

## 2022-05-24 DIAGNOSIS — M54.41 CHRONIC BILATERAL LOW BACK PAIN WITH RIGHT-SIDED SCIATICA: ICD-10-CM

## 2022-05-24 DIAGNOSIS — G89.29 CHRONIC PAIN OF RIGHT KNEE: ICD-10-CM

## 2022-05-24 DIAGNOSIS — B35.3 TINEA PEDIS OF LEFT FOOT: ICD-10-CM

## 2022-05-24 DIAGNOSIS — R63.1 POLYDIPSIA: ICD-10-CM

## 2022-05-24 DIAGNOSIS — R80.9 TYPE 2 DIABETES MELLITUS WITH MICROALBUMINURIA, WITH LONG-TERM CURRENT USE OF INSULIN (HCC): ICD-10-CM

## 2022-05-24 DIAGNOSIS — F14.10 COCAINE ABUSE (HCC): ICD-10-CM

## 2022-05-24 DIAGNOSIS — R21 RASH: ICD-10-CM

## 2022-05-24 DIAGNOSIS — M54.2 NECK PAIN: ICD-10-CM

## 2022-05-24 DIAGNOSIS — L60.0 INGROWING NAIL, RIGHT GREAT TOE: ICD-10-CM

## 2022-05-24 RX ORDER — POTASSIUM CHLORIDE 20 MEQ/1
20 TABLET, EXTENDED RELEASE ORAL DAILY
Qty: 30 TABLET | Refills: 2 | Status: SHIPPED | OUTPATIENT
Start: 2022-05-24

## 2022-05-24 RX ORDER — NAPROXEN 500 MG/1
500 TABLET ORAL 2 TIMES DAILY
Qty: 30 TABLET | Refills: 0 | Status: SHIPPED | OUTPATIENT
Start: 2022-05-24 | End: 2022-06-23

## 2022-05-24 NOTE — TELEPHONE ENCOUNTER
Janine Marquez is requesting a refill on the following medication(s):  Requested Prescriptions     Pending Prescriptions Disp Refills    potassium chloride (KLOR-CON M) 20 MEQ extended release tablet [Pharmacy Med Name: Potassium Chloride [place] ER 20MEQ TBCR] 30 tablet 2     Sig: TAKE 1 TABLET BY MOUTH DAILY    naproxen (NAPROSYN) 500 MG tablet [Pharmacy Med Name: Naproxen 500MG TABS] 60 tablet 2     Sig: TAKE 1 TABLET BY MOUTH TWICE A DAY AS NEEDED FOR PAIN       Last Visit Date (If Applicable):  4/66/4607    Next Visit Date:    6/20/2022

## 2022-05-25 ENCOUNTER — TELEPHONE (OUTPATIENT)
Dept: FAMILY MEDICINE CLINIC | Age: 47
End: 2022-05-25

## 2022-05-25 NOTE — TELEPHONE ENCOUNTER
Pt. Called to see if his labs he did Saturday were ok or not. Please feel free to call  Him. I did let him know that if he has not gotten a call then that is usually a good sign and that his doctor may be waiting until his follow up to discuss his labs with him.

## 2022-06-02 ENCOUNTER — OFFICE VISIT (OUTPATIENT)
Dept: FAMILY MEDICINE CLINIC | Age: 47
End: 2022-06-02
Payer: COMMERCIAL

## 2022-06-02 VITALS
RESPIRATION RATE: 14 BRPM | HEIGHT: 71 IN | SYSTOLIC BLOOD PRESSURE: 118 MMHG | BODY MASS INDEX: 42.19 KG/M2 | DIASTOLIC BLOOD PRESSURE: 80 MMHG | HEART RATE: 82 BPM | WEIGHT: 301.4 LBS | OXYGEN SATURATION: 96 % | TEMPERATURE: 97.7 F

## 2022-06-02 DIAGNOSIS — R09.1 PLEURISY: Primary | ICD-10-CM

## 2022-06-02 DIAGNOSIS — R05.9 COUGH IN ADULT: ICD-10-CM

## 2022-06-02 DIAGNOSIS — R07.1 PAINFUL RESPIRATION: ICD-10-CM

## 2022-06-02 LAB — D-DIMER QUANTITATIVE: < 245 NG/DL (ref 0–245)

## 2022-06-02 PROCEDURE — 99213 OFFICE O/P EST LOW 20 MIN: CPT | Performed by: NURSE PRACTITIONER

## 2022-06-02 PROCEDURE — G8417 CALC BMI ABV UP PARAM F/U: HCPCS | Performed by: NURSE PRACTITIONER

## 2022-06-02 PROCEDURE — 99211 OFF/OP EST MAY X REQ PHY/QHP: CPT | Performed by: NURSE PRACTITIONER

## 2022-06-02 PROCEDURE — G8427 DOCREV CUR MEDS BY ELIG CLIN: HCPCS | Performed by: NURSE PRACTITIONER

## 2022-06-02 PROCEDURE — 4004F PT TOBACCO SCREEN RCVD TLK: CPT | Performed by: NURSE PRACTITIONER

## 2022-06-02 PROCEDURE — PBSHW PBB SHADOW CHARGE: Performed by: NURSE PRACTITIONER

## 2022-06-02 RX ORDER — TIZANIDINE 2 MG/1
2 TABLET ORAL NIGHTLY PRN
Qty: 10 TABLET | Refills: 0 | Status: SHIPPED | OUTPATIENT
Start: 2022-06-02

## 2022-06-02 ASSESSMENT — ENCOUNTER SYMPTOMS
CHEST TIGHTNESS: 0
SHORTNESS OF BREATH: 0
COUGH: 1
WHEEZING: 0

## 2022-06-02 NOTE — PATIENT INSTRUCTIONS
Have D Dimer done. I will call you with results and follow up recommendations. Follow up with primary care provider in 1 to 2 days if needed. Patient Education        Musculoskeletal Chest Pain: Care Instructions  Your Care Instructions     Chest pain is not always a sign that something is wrong with your heart or that you have another serious problem. The doctor thinks your chest pain is caused by strained muscles or ligaments, inflamed chest cartilage, or another problem in your chest, rather than by your heart. You may need more tests to find thecause of your chest pain. Follow-up care is a key part of your treatment and safety. Be sure to make and go to all appointments, and call your doctor if you are having problems. It's also a good idea to know your test results and keep alist of the medicines you take. How can you care for yourself at home?  Take pain medicines exactly as directed. ? If the doctor gave you a prescription medicine for pain, take it as prescribed. ? If you are not taking a prescription pain medicine, ask your doctor if you can take an over-the-counter medicine.  Rest and protect the sore area.  Stop, change, or take a break from any activity that may be causing your pain or soreness.  Put ice or a cold pack on the sore area for 10 to 20 minutes at a time. Try to do this every 1 to 2 hours for the next 3 days (when you are awake) or until the swelling goes down. Put a thin cloth between the ice and your skin.  After 2 or 3 days, apply a heating pad set on low or a warm cloth to the area that hurts. Some doctors suggest that you go back and forth between hot and cold.  Do not wrap or tape your ribs for support. This may cause you to take smaller breaths, which could increase your risk of lung problems.  Mentholated creams such as Bengay or Icy Hot may soothe sore muscles. Follow the instructions on the package.    Follow your doctor's instructions for exercising.  Gentle stretching and massage may help you get better faster. Stretch slowly to the point just before pain begins, and hold the stretch for at least 15 to 30 seconds. Do this 3 or 4 times a day. Stretch just after you have applied heat.  As your pain gets better, slowly return to your normal activities. Any increased pain may be a sign that you need to rest a while longer. When should you call for help? Call 911 anytime you think you may need emergency care. For example, call if:     You have chest pain or pressure. This may occur with:  ? Sweating. ? Shortness of breath. ? Nausea or vomiting. ? Pain that spreads from the chest to the neck, jaw, or one or both shoulders or arms. ? Dizziness or lightheadedness. ? A fast or uneven pulse. After calling 911, chew 1 adult-strength aspirin. Wait for an ambulance. Do not try to drive yourself.      You have sudden chest pain and shortness of breath, or you cough up blood. Call your doctor now or seek immediate medical care if:     You have any trouble breathing.      Your chest pain gets worse.      Your chest pain occurs consistently with exercise and is relieved by rest.   Watch closely for changes in your health, and be sure to contact your doctor if:     Your chest pain does not get better after 1 week. Where can you learn more? Go to https://[employer]peDivesquare.[employer]. org and sign in to your K[employer] account. Enter 3297 [id number] in the [facility] box to learn more about \"Musculoskeletal Chest Pain: Care Instructions. \"     If you do not have an account, please click on the \"Sign Up Now\" link. Current as of: July 1, 2021               Content Version: 13.2  © 0625-2687 Healthwise, [employer]. Care instructions adapted under license by [facility] ([facility]).  If you have questions about a medical condition or this instruction, always ask your healthcare professional. Rena Madrigal any warranty or liability for your use of this information. Patient Education        Cough: Care Instructions  Overview     A cough is your body's response to something that bothers your throat or airways. Many things can cause a cough. You might cough because of a cold or the flu, bronchitis, or asthma. Smoking, postnasal drip, allergies, and stomachacid that backs up into your throat also can cause coughs. A cough is a symptom, not a disease. Most coughs stop when the cause, such as acold, goes away. You can take a few steps at home to cough less and feel better. Follow-up care is a key part of your treatment and safety. Be sure to make and go to all appointments, and call your doctor if you are having problems. It's also a good idea to know your test results and keep alist of the medicines you take. How can you care for yourself at home?  Drink lots of water and other fluids. This helps thin the mucus and soothes a dry or sore throat. Honey or lemon juice in hot water or tea may ease a dry cough.  Take cough medicine as directed by your doctor.  Prop up your head on pillows to help you breathe and ease a dry cough.  Try cough drops or hard candy to soothe a dry or sore throat.  Do not smoke. Avoid secondhand smoke. If you need help quitting, talk to your doctor about stop-smoking programs and medicines. These can increase your chances of quitting for good. When should you call for help? Call 911 anytime you think you may need emergency care. For example, call if:     You have severe trouble breathing. Call your doctor now or seek immediate medical care if:     You cough up blood.      You have new or worse trouble breathing.      You have a new or higher fever.      You have a new rash.    Watch closely for changes in your health, and be sure to contact your doctor if:     You cough more deeply or more often, especially if you notice more mucus or a change in the color of your mucus.      You have new symptoms, such as a sore throat, an earache, or sinus pain.      You do not get better as expected. Where can you learn more? Go to https://Teaman [employer] Companypepiceweb.[employer]. org and sign in to your [employer] account. Enter D279 in the [employer] box to learn more about \"Cough: Care Instructions. \"     If you do not have an account, please click on the \"Sign Up Now\" link. Current as of: July 6, 2021               Content Version: 13.2  © 1534-9836 Healthwise, Inc[employer]. Care instructions adapted under license by [facility] ([facility]). If you have questions about a medical condition or this instruction, always ask your healthcare professional. [address] any warranty or liability for your use of this information.

## 2022-06-02 NOTE — PROGRESS NOTES
[address], APRN-CNP  [address]  Phone:  [phone]  Fax:  [phone]  Javier Traylor is a 40 y.o. male who presents today for his medical conditions/complaints as noted below. Joshua A McQuillin c/o of Back Pain (Pt presents to walkin with complaints fatigue for the last 2 days and of back pain in his thoracic spine region since last night, pt says it is worse when he inhales and exhales and has not found anything that make it better. Pt says he was told 2 weeks ago he had bronchitis and was prescribed antibiotics but is still coughing.)      HPI:     Cough  This is a new problem. The current episode started 1 to 4 weeks ago (Mid May). Progression since onset: Worsened in the past 2 days. The cough is non-productive. Associated symptoms include chest pain (posterior right thoracic region). Pertinent negatives include no fever, shortness of breath or wheezing. Exacerbated by: deep breathing. Risk factors for lung disease include smoking/tobacco exposure. Reports not being able to smoke today due to painful expiration. Patient questions if he had COVID in the past few months. Had high white count and CXR. Clinically diagnosed with Bronchitis. CXR normal per report. Finished antibiotics last week sometime. About 4 days ago still coughing and is productive of sputum - green. Eye discharge. Back pain now. Hurts some in inspiration but more on expiration.     Wt Readings from Last 3 Encounters:   06/02/22 (!) 301 lb 6.4 oz (136.7 kg)   05/19/22 297 lb 12.8 oz (135.1 kg)   05/17/22 (!) 302 lb 12.8 oz (137.3 kg)       Temp Readings from Last 3 Encounters:   06/02/22 97.7 °F (36.5 °C)   05/19/22 96.1 °F (35.6 °C)   05/17/22 97 °F (36.1 °C) (Tympanic)       BP Readings from Last 3 Encounters:   06/02/22 118/80   05/19/22 (!) 140/80   05/17/22 (!) 147/93       Pulse Readings from Last 3 Encounters:   06/02/22 82   05/19/22 88   05/17/22 85 SpO2 Readings from Last 3 Encounters:   06/02/22 96%   05/19/22 96%   05/17/22 94%             Past Medical History:   Diagnosis Date    [facility]     History of heroin abuse ([facility]     Hypertension     Personality disorder ([facility]       Past Surgical History:   Procedure Laterality Date    FINGER SURGERY Left     index finger    KNEE SURGERY Left     had metal removed     Family History   Problem Relation Age of Onset    High Blood Pressure Mother     Diabetes Mother     Kidney Disease Mother     Heart Disease Father     High Blood Pressure Father     Cancer Father      Social History     Tobacco Use    Smoking status: Current Every Day Smoker     Types: Cigarettes    Smokeless tobacco: Current User    Tobacco comment: one pack a day   Substance Use Topics    Alcohol use: Not Currently      Current Outpatient Medications   Medication Sig Dispense Refill    tiZANidine (ZANAFLEX) 2 MG tablet Take 1 tablet by mouth nightly as needed (muscle spasm) 10 tablet 0    potassium chloride (KLOR-CON M) 20 MEQ extended release tablet TAKE 1 TABLET BY MOUTH DAILY 30 tablet 2    naproxen (NAPROSYN) 500 MG tablet Take 1 tablet by mouth in the morning and at bedtime 30 tablet 0    Dulaglutide (TRULICITY) 3 PM/4.1TI SOPN Inject 3 mg into the skin once a week 4 pen 3    Insulin Glargine, 2 Unit Dial, (TOUJEO MAX SOLOSTAR) 300 UNIT/ML SOPN Inject 40 Units into the skin daily 2 pen 3    Alcohol Swabs (ALCOHOL PREP) 70 % PADS TEST THREE TIMES A  each 3    albuterol sulfate  (90 Base) MCG/ACT inhaler Inhale 2 puffs into the lungs every 4 hours as needed for Wheezing or Shortness of Breath (cough) 3 each 3    hydroCHLOROthiazide (HYDRODIURIL) 25 MG tablet Take 1 tablet by mouth every morning 30 tablet 2    losartan (COZAAR) 100 MG tablet Take 1 tablet by mouth daily 30 tablet 3    budesonide-formoterol (SYMBICORT) 160-4.5 MCG/ACT AERO INHALE 2 PUFFS BY MOUTH TWICE A DAY RINSE MOUTH AFTER USE 10.2 g 3    metoprolol succinate (TOPROL XL) 50 MG extended release tablet Take 1 tablet by mouth daily 90 tablet 1    metFORMIN (GLUCOPHAGE) 1000 MG tablet TAKE 1 TABLET BY MOUTH TWICE A DAY WITH MEALS 60 tablet 3    atorvastatin (LIPITOR) 20 MG tablet Take 1 tablet by mouth daily 30 tablet 3    ketoconazole (NIZORAL) 2 % cream Apply topically daily three times day 30 g 1    Cholecalciferol (VITAMIN D3) 50 MCG (2000 UT) TABS TAKE 1 TABLET BY MOUTH DAILY 30 tablet 1    ezetimibe (ZETIA) 10 MG tablet TAKE 1 TABLET BY MOUTH DAILY 30 tablet 3    Insulin Pen Needle (ULTICARE SHORT PEN NEEDLES) 31G X 8 MM MISC USE WITH LANTUS TWICE A  each 3    hydrOXYzine (ATARAX) 25 MG tablet Take 25 mg by mouth every 6 hours as needed       blood glucose monitor kit and supplies Dispense all needed supplies to include: monitor, strips, lancing device, lancets, control solutions, alcohol swabs. 1 kit 0    Lancets MISC 1 each by Does not apply route 3 times daily 300 each 3    blood glucose monitor strips Test 3 times a day & as needed for symptoms of irregular blood glucose. Dispense sufficient amount for indicated testing frequency. 300 strip 3    OXcarbazepine (TRILEPTAL) 300 MG tablet Take 1 tablet by mouth daily      ondansetron (ZOFRAN ODT) 4 MG disintegrating tablet Take 1 tablet by mouth every 8 hours as needed for Nausea (Patient not taking: Reported on 6/2/2022) 12 tablet 0    Blood Pressure Monitoring (B-D ASSURE BPM/MANUAL ARM CUFF) MISC 1 applicator by Does not apply route 2 times daily Check BP twice daily (Patient not taking: Reported on 6/2/2022) 1 each 0     No current facility-administered medications for this visit. Allergies   Allergen Reactions    Amoxicillin     Other      Moral mushrooms    Pcn [Penicillins]      Thinks pcn        No exam data present    Subjective:      Review of Systems   Constitutional: Negative for diaphoresis, fatigue and fever.    Respiratory: Positive for cough. Negative for chest tightness, shortness of breath and wheezing. Cardiovascular: Positive for chest pain (posterior right thoracic region). Negative for palpitations and leg swelling. Objective:     /80 (Site: Right Upper Arm, Position: Sitting, Cuff Size: Large Adult)   Pulse 82   Temp 97.7 °F (36.5 °C)   Resp 14   Ht 5' 11\" (1.803 m)   Wt (!) 301 lb 6.4 oz (136.7 kg)   SpO2 96%   BMI 42.04 kg/m²     Physical Exam  Constitutional:       General: He is not in acute distress. Appearance: He is well-developed. He is not ill-appearing, toxic-appearing or diaphoretic. HENT:      Head: Normocephalic. Right Ear: Tympanic membrane, ear canal and external ear normal.      Left Ear: Tympanic membrane, ear canal and external ear normal.      Nose: Nose normal. No mucosal edema, congestion or rhinorrhea. Mouth/Throat:      Mouth: Mucous membranes are moist. Mucous membranes are not pale and not dry. Pharynx: Oropharynx is clear. Eyes:      General: Lids are normal. No scleral icterus. Right eye: No discharge. Left eye: No discharge. Extraocular Movements:      Right eye: No nystagmus. Left eye: No nystagmus. Conjunctiva/sclera: Conjunctivae normal.   Neck:      Trachea: Trachea normal.   Cardiovascular:      Rate and Rhythm: Normal rate and regular rhythm. Pulses: Normal pulses. Heart sounds: Normal heart sounds. Pulmonary:      Effort: Pulmonary effort is normal. No accessory muscle usage or respiratory distress. Breath sounds: Normal breath sounds. Musculoskeletal:         General: Normal range of motion. Cervical back: Full passive range of motion without pain and normal range of motion. Skin:     General: Skin is warm and dry. Capillary Refill: Capillary refill takes less than 2 seconds. Coloration: Skin is not pale.    Neurological:      Mental Status: He is alert and oriented to person, place, and time.   Psychiatric:         Mood and Affect: Mood normal.         Speech: Speech normal.         Behavior: Behavior normal.         Thought Content: Thought content normal.         Judgment: Judgment normal.         Assessment:      Diagnosis Orders   1. Pleurisy  tiZANidine (ZANAFLEX) 2 MG tablet   2. Cough in adult  tiZANidine (ZANAFLEX) 2 MG tablet   3. Painful respiration  tiZANidine (ZANAFLEX) 2 MG tablet     No results found for this visit on 06/02/22. D dimer ordered. Results for Maliha Brito (MRN [id number]) as of 6/2/2022 14:53   Ref. Range 6/2/2022 14:05   D-Dimer, Quant Latest Ref Range: 0 - 245 ng/dL < 245     Plan:       Have D Dimer done. I will call you with results and follow up recommendations. Follow up with primary care provider in 1 to 2 days if needed. Patient Instructions     Have D Dimer done. I will call you with results and follow up recommendations. Follow up with primary care provider in 1 to 2 days if needed. Patient Education        Musculoskeletal Chest Pain: Care Instructions  Your Care Instructions     Chest pain is not always a sign that something is wrong with your heart or that you have another serious problem. The doctor thinks your chest pain is caused by strained muscles or ligaments, inflamed chest cartilage, or another problem in your chest, rather than by your heart. You may need more tests to find thecause of your chest pain. Follow-up care is a key part of your treatment and safety. Be sure to make and go to all appointments, and call your doctor if you are having problems. It's also a good idea to know your test results and keep alist of the medicines you take. How can you care for yourself at home?  Take pain medicines exactly as directed. ? If the doctor gave you a prescription medicine for pain, take it as prescribed.   ? If you are not taking a prescription pain medicine, ask your doctor if you can take an over-the-counter medicine.  Rest and protect the sore area.  Stop, change, or take a break from any activity that may be causing your pain or soreness.  Put ice or a cold pack on the sore area for 10 to 20 minutes at a time. Try to do this every 1 to 2 hours for the next 3 days (when you are awake) or until the swelling goes down. Put a thin cloth between the ice and your skin.  After 2 or 3 days, apply a heating pad set on low or a warm cloth to the area that hurts. Some doctors suggest that you go back and forth between hot and cold.  Do not wrap or tape your ribs for support. This may cause you to take smaller breaths, which could increase your risk of lung problems.  Mentholated creams such as Bengay or Icy Hot may soothe sore muscles. Follow the instructions on the package.  Follow your doctor's instructions for exercising.  Gentle stretching and massage may help you get better faster. Stretch slowly to the point just before pain begins, and hold the stretch for at least 15 to 30 seconds. Do this 3 or 4 times a day. Stretch just after you have applied heat.  As your pain gets better, slowly return to your normal activities. Any increased pain may be a sign that you need to rest a while longer. When should you call for help? Call 911 anytime you think you may need emergency care. For example, call if:     You have chest pain or pressure. This may occur with:  ? Sweating. ? Shortness of breath. ? Nausea or vomiting. ? Pain that spreads from the chest to the neck, jaw, or one or both shoulders or arms. ? Dizziness or lightheadedness. ? A fast or uneven pulse. After calling 911, chew 1 adult-strength aspirin. Wait for an ambulance. Do not try to drive yourself.      You have sudden chest pain and shortness of breath, or you cough up blood.    Call your doctor now or seek immediate medical care if:     You have any trouble breathing.      Your chest pain gets worse.      Your chest pain occurs consistently with exercise and is relieved by rest.   Watch closely for changes in your health, and be sure to contact your doctor if:     Your chest pain does not get better after 1 week. Where can you learn more? Go to https://Pivot3peramesh.Groovideo. org and sign in to your [employer] account. Enter 6646 [id number] in the [facility] box to learn more about \"Musculoskeletal Chest Pain: Care Instructions. \"     If you do not have an account, please click on the \"Sign Up Now\" link. Current as of: July 1, 2021               Content Version: 13.2  © 0730-3765 [employer]. Care instructions adapted under license by [address] If you have questions about a medical condition or this instruction, always ask your healthcare professional. [place] [address] any warranty or liability for your use of this information. Patient Education        Cough: Care Instructions  Overview     A cough is your body's response to something that bothers your throat or airways. Many things can cause a cough. You might cough because of a cold or the flu, bronchitis, or asthma. Smoking, postnasal drip, allergies, and stomachacid that backs up into your throat also can cause coughs. A cough is a symptom, not a disease. Most coughs stop when the cause, such as acold, goes away. You can take a few steps at home to cough less and feel better. Follow-up care is a key part of your treatment and safety. Be sure to make and go to all appointments, and call your doctor if you are having problems. It's also a good idea to know your test results and keep alist of the medicines you take. How can you care for yourself at home?  Drink lots of water and other fluids. This helps thin the mucus and soothes a dry or sore throat. Honey or lemon juice in hot water or tea may ease a dry cough.  Take cough medicine as directed by your doctor.    Prop up your head on pillows to help you breathe and ease a dry cough.   Try cough drops or hard candy to soothe a dry or sore throat.  Do not smoke. Avoid secondhand smoke. If you need help quitting, talk to your doctor about stop-smoking programs and medicines. These can increase your chances of quitting for good. When should you call for help? Call 911 anytime you think you may need emergency care. For example, call if:     You have severe trouble breathing. Call your doctor now or seek immediate medical care if:     You cough up blood.      You have new or worse trouble breathing.      You have a new or higher fever.      You have a new rash. Watch closely for changes in your health, and be sure to contact your doctor if:     You cough more deeply or more often, especially if you notice more mucus or a change in the color of your mucus.      You have new symptoms, such as a sore throat, an earache, or sinus pain.      You do not get better as expected. Where can you learn more? Go to https://Align Networks.N12 Technologies. org and sign in to your [employer] account. Enter D279 in the [employer] [facility] box to learn more about \"Cough: Care Instructions. \"     If you do not have an account, please click on the \"Sign Up Now\" link. Current as of: July 6, 2021               Content Version: 13.2  © 2006-2022 [employer]. Care instructions adapted under license by [facility] ([facility]). If you have questions about a medical condition or this instruction, always ask your healthcare professional. [place] [address] any warranty or liability for your use of this information. Patient/Caregiver instructed on use, benefit, and side effects of prescribed medications. All patient/parent/caregiver questions answered. Patient/parent/caregiver voiced understanding. Reviewed health maintenance. Instructed to continue current medications, diet and exercise. Patient agreed with treatment plan. Follow up as directed. Electronically signed by DAWIT Escobar  [id number]

## 2022-06-02 NOTE — RESULT ENCOUNTER NOTE
Please inform patient his lab work is normal.  He likely has a pulled muscle from coughing. I would recommend he use the Naproxen for inflammation and pain relief. I am also sending a muscle relaxant in for bed time. If he needs more Naproxen let me know.

## 2022-10-27 DIAGNOSIS — Z79.4 TYPE 2 DIABETES MELLITUS WITH MICROALBUMINURIA, WITH LONG-TERM CURRENT USE OF INSULIN (HCC): ICD-10-CM

## 2022-10-27 DIAGNOSIS — I10 ESSENTIAL HYPERTENSION: ICD-10-CM

## 2022-10-27 DIAGNOSIS — E11.29 TYPE 2 DIABETES MELLITUS WITH MICROALBUMINURIA, WITH LONG-TERM CURRENT USE OF INSULIN (HCC): ICD-10-CM

## 2022-10-27 DIAGNOSIS — R80.9 TYPE 2 DIABETES MELLITUS WITH MICROALBUMINURIA, WITH LONG-TERM CURRENT USE OF INSULIN (HCC): ICD-10-CM

## 2022-10-27 RX ORDER — LOSARTAN POTASSIUM 100 MG/1
100 TABLET ORAL DAILY
Qty: 30 TABLET | Refills: 0 | Status: SHIPPED | OUTPATIENT
Start: 2022-10-27

## 2022-10-27 NOTE — TELEPHONE ENCOUNTER
Sanjuanita Matute is requesting a refill on the following medication(s):  Requested Prescriptions     Pending Prescriptions Disp Refills    losartan (COZAAR) 100 MG tablet 30 tablet 3     Sig: Take 1 tablet by mouth daily       Last Visit Date (If Applicable):  5/30/4297    Next Visit Date:    Visit date not found

## 2022-11-29 DIAGNOSIS — I10 ESSENTIAL HYPERTENSION: ICD-10-CM

## 2022-11-29 DIAGNOSIS — R80.9 TYPE 2 DIABETES MELLITUS WITH MICROALBUMINURIA, WITH LONG-TERM CURRENT USE OF INSULIN (HCC): ICD-10-CM

## 2022-11-29 DIAGNOSIS — G89.29 OTHER CHRONIC PAIN: ICD-10-CM

## 2022-11-29 DIAGNOSIS — B35.9 RINGWORM: ICD-10-CM

## 2022-11-29 DIAGNOSIS — E11.29 TYPE 2 DIABETES MELLITUS WITH MICROALBUMINURIA, WITH LONG-TERM CURRENT USE OF INSULIN (HCC): ICD-10-CM

## 2022-11-29 DIAGNOSIS — J40 BRONCHITIS: ICD-10-CM

## 2022-11-29 DIAGNOSIS — Z79.4 TYPE 2 DIABETES MELLITUS WITH MICROALBUMINURIA, WITH LONG-TERM CURRENT USE OF INSULIN (HCC): ICD-10-CM

## 2022-11-29 DIAGNOSIS — L60.0 INGROWING NAIL, RIGHT GREAT TOE: ICD-10-CM

## 2022-11-29 DIAGNOSIS — F14.10 COCAINE ABUSE (HCC): ICD-10-CM

## 2022-11-29 DIAGNOSIS — M54.2 NECK PAIN: ICD-10-CM

## 2022-11-29 DIAGNOSIS — F11.21 OPIOID DEPENDENCE IN REMISSION (HCC): ICD-10-CM

## 2022-11-29 DIAGNOSIS — M25.561 CHRONIC PAIN OF RIGHT KNEE: ICD-10-CM

## 2022-11-29 DIAGNOSIS — R21 RASH: ICD-10-CM

## 2022-11-29 DIAGNOSIS — M54.41 CHRONIC BILATERAL LOW BACK PAIN WITH RIGHT-SIDED SCIATICA: ICD-10-CM

## 2022-11-29 DIAGNOSIS — R06.2 WHEEZING: ICD-10-CM

## 2022-11-29 DIAGNOSIS — E87.6 HYPOKALEMIA: ICD-10-CM

## 2022-11-29 DIAGNOSIS — R35.89 POLYURIA: ICD-10-CM

## 2022-11-29 DIAGNOSIS — E78.1 HYPERTRIGLYCERIDEMIA: ICD-10-CM

## 2022-11-29 DIAGNOSIS — B35.3 TINEA PEDIS OF LEFT FOOT: ICD-10-CM

## 2022-11-29 DIAGNOSIS — F17.200 SMOKER: ICD-10-CM

## 2022-11-29 DIAGNOSIS — G89.29 CHRONIC BILATERAL LOW BACK PAIN WITH RIGHT-SIDED SCIATICA: ICD-10-CM

## 2022-11-29 DIAGNOSIS — G89.29 CHRONIC PAIN OF RIGHT KNEE: ICD-10-CM

## 2022-11-29 DIAGNOSIS — E55.9 VITAMIN D DEFICIENCY: ICD-10-CM

## 2022-11-29 DIAGNOSIS — J45.40 MODERATE PERSISTENT ASTHMA, UNSPECIFIED WHETHER COMPLICATED: ICD-10-CM

## 2022-11-29 DIAGNOSIS — R63.1 POLYDIPSIA: ICD-10-CM

## 2022-11-29 RX ORDER — POTASSIUM CHLORIDE 20 MEQ/1
20 TABLET, EXTENDED RELEASE ORAL DAILY
Qty: 30 TABLET | Refills: 0 | Status: SHIPPED | OUTPATIENT
Start: 2022-11-29